# Patient Record
Sex: MALE | Race: WHITE | NOT HISPANIC OR LATINO | Employment: OTHER | URBAN - METROPOLITAN AREA
[De-identification: names, ages, dates, MRNs, and addresses within clinical notes are randomized per-mention and may not be internally consistent; named-entity substitution may affect disease eponyms.]

---

## 2018-04-02 ENCOUNTER — OFFICE VISIT CONVERTED (OUTPATIENT)
Dept: UROLOGY | Facility: CLINIC | Age: 78
End: 2018-04-02
Attending: UROLOGY

## 2018-04-02 ENCOUNTER — CONVERSION ENCOUNTER (OUTPATIENT)
Dept: UROLOGY | Facility: CLINIC | Age: 78
End: 2018-04-02

## 2018-05-04 ENCOUNTER — CONVERSION ENCOUNTER (OUTPATIENT)
Dept: OTHER | Facility: HOSPITAL | Age: 78
End: 2018-05-04

## 2018-05-04 ENCOUNTER — OFFICE VISIT CONVERTED (OUTPATIENT)
Dept: OTHER | Facility: HOSPITAL | Age: 78
End: 2018-05-04
Attending: SPECIALIST

## 2018-06-19 ENCOUNTER — CONVERSION ENCOUNTER (OUTPATIENT)
Dept: CARDIOLOGY | Facility: CLINIC | Age: 78
End: 2018-06-19
Attending: SPECIALIST

## 2018-08-09 ENCOUNTER — CONVERSION ENCOUNTER (OUTPATIENT)
Dept: FAMILY MEDICINE CLINIC | Facility: CLINIC | Age: 78
End: 2018-08-09

## 2018-08-09 ENCOUNTER — OFFICE VISIT CONVERTED (OUTPATIENT)
Dept: FAMILY MEDICINE CLINIC | Facility: CLINIC | Age: 78
End: 2018-08-09
Attending: NURSE PRACTITIONER

## 2019-02-13 ENCOUNTER — HOSPITAL ENCOUNTER (OUTPATIENT)
Dept: FAMILY MEDICINE CLINIC | Facility: CLINIC | Age: 79
Discharge: HOME | End: 2019-02-13
Attending: NURSE PRACTITIONER

## 2019-02-13 ENCOUNTER — OFFICE VISIT CONVERTED (OUTPATIENT)
Dept: FAMILY MEDICINE CLINIC | Facility: CLINIC | Age: 79
End: 2019-02-13
Attending: NURSE PRACTITIONER

## 2019-02-13 LAB
ALBUMIN SERPL-MCNC: 4.3 G/DL (ref 3.5–5)
ALBUMIN/GLOB SERPL: 1.6 {RATIO} (ref 1.4–2.6)
ALP SERPL-CCNC: 62 U/L (ref 56–155)
ALT SERPL-CCNC: 37 U/L (ref 10–40)
ANION GAP SERPL CALC-SCNC: 17 MMOL/L (ref 8–19)
APPEARANCE UR: ABNORMAL
AST SERPL-CCNC: 24 U/L (ref 15–50)
BASOPHILS # BLD AUTO: 0.02 10*3/UL (ref 0–0.2)
BASOPHILS NFR BLD AUTO: 0.33 % (ref 0–3)
BILIRUB SERPL-MCNC: 1.24 MG/DL (ref 0.2–1.3)
BILIRUB UR QL: NEGATIVE
BUN SERPL-MCNC: 23 MG/DL (ref 5–25)
BUN/CREAT SERPL: 18 {RATIO} (ref 6–20)
CALCIUM SERPL-MCNC: 9.9 MG/DL (ref 8.7–10.4)
CHLORIDE SERPL-SCNC: 102 MMOL/L (ref 99–111)
CHOLEST SERPL-MCNC: 191 MG/DL (ref 107–200)
CHOLEST/HDLC SERPL: 2.9 {RATIO} (ref 3–6)
COLOR UR: YELLOW
CONV BACTERIA: NEGATIVE
CONV CO2: 28 MMOL/L (ref 22–32)
CONV COLLECTION SOURCE (UA): ABNORMAL
CONV HYALINE CASTS IN URINE MICRO: ABNORMAL /[LPF]
CONV TOTAL PROTEIN: 7 G/DL (ref 6.3–8.2)
CONV UROBILINOGEN IN URINE BY AUTOMATED TEST STRIP: 0.2 {EHRLICHU}/DL (ref 0.1–1)
CREAT UR-MCNC: 1.29 MG/DL (ref 0.7–1.2)
EOSINOPHIL # BLD AUTO: 0.15 10*3/UL (ref 0–0.7)
EOSINOPHIL # BLD AUTO: 2.38 % (ref 0–7)
ERYTHROCYTE [DISTWIDTH] IN BLOOD BY AUTOMATED COUNT: 11.3 % (ref 11.5–14.5)
EST. AVERAGE GLUCOSE BLD GHB EST-MCNC: 128 MG/DL
GFR SERPLBLD BASED ON 1.73 SQ M-ARVRAT: 53 ML/MIN/{1.73_M2}
GLOBULIN UR ELPH-MCNC: 2.7 G/DL (ref 2–3.5)
GLUCOSE SERPL-MCNC: 117 MG/DL (ref 70–99)
GLUCOSE UR QL: NEGATIVE MG/DL
HBA1C MFR BLD: 15.2 G/DL (ref 14–18)
HBA1C MFR BLD: 6.1 % (ref 3.5–5.7)
HCT VFR BLD AUTO: 44.2 % (ref 42–52)
HDLC SERPL-MCNC: 66 MG/DL (ref 40–60)
HGB UR QL STRIP: NEGATIVE
KETONES UR QL STRIP: NEGATIVE MG/DL
LDLC SERPL CALC-MCNC: 85 MG/DL (ref 70–100)
LEUKOCYTE ESTERASE UR QL STRIP: ABNORMAL
LYMPHOCYTES # BLD AUTO: 1.79 10*3/UL (ref 1–5)
MCH RBC QN AUTO: 34.2 PG (ref 27–31)
MCHC RBC AUTO-ENTMCNC: 34.3 G/DL (ref 33–37)
MCV RBC AUTO: 99.7 FL (ref 80–96)
MONOCYTES # BLD AUTO: 0.52 10*3/UL (ref 0.2–1.2)
MONOCYTES NFR BLD AUTO: 8.2 % (ref 3–10)
NEUTROPHILS # BLD AUTO: 3.85 10*3/UL (ref 2–8)
NEUTROPHILS NFR BLD AUTO: 60.9 % (ref 30–85)
NITRITE UR QL STRIP: NEGATIVE
NRBC BLD AUTO-RTO: 0 % (ref 0–0.01)
OSMOLALITY SERPL CALC.SUM OF ELEC: 299 MOSM/KG (ref 273–304)
PH UR STRIP.AUTO: 5 [PH] (ref 5–8)
PLATELET # BLD AUTO: 228 10*3/UL (ref 130–400)
PMV BLD AUTO: 7.5 FL (ref 7.4–10.4)
POTASSIUM SERPL-SCNC: 4.9 MMOL/L (ref 3.5–5.3)
PROT UR QL: NEGATIVE MG/DL
RBC # BLD AUTO: 4.44 10*6/UL (ref 4.7–6.1)
RBC #/AREA URNS HPF: ABNORMAL /[HPF]
SODIUM SERPL-SCNC: 142 MMOL/L (ref 135–147)
SP GR UR: 1.02 (ref 1–1.03)
SQUAMOUS SPT QL MICRO: ABNORMAL /[HPF]
T4 FREE SERPL-MCNC: 1.1 NG/DL (ref 0.9–1.8)
TRIGL SERPL-MCNC: 201 MG/DL (ref 40–150)
TSH SERPL-ACNC: 4.06 M[IU]/L (ref 0.27–4.2)
VARIANT LYMPHS NFR BLD MANUAL: 28.2 % (ref 20–45)
VLDLC SERPL-MCNC: 40 MG/DL (ref 5–37)
WBC # BLD AUTO: 6.33 10*3/UL (ref 4.8–10.8)
WBC #/AREA URNS HPF: ABNORMAL /[HPF]

## 2019-02-14 LAB
CONV CREATININE URINE, RANDOM: 256.3 MG/DL (ref 10–300)
CONV MICROALBUM.,U,RANDOM: 57.9 MG/L (ref 0–20)
MICROALBUMIN/CREAT UR: 22.6 MG/G{CRE} (ref 0–25)

## 2019-08-13 ENCOUNTER — OFFICE VISIT CONVERTED (OUTPATIENT)
Dept: FAMILY MEDICINE CLINIC | Facility: CLINIC | Age: 79
End: 2019-08-13
Attending: NURSE PRACTITIONER

## 2019-08-13 ENCOUNTER — HOSPITAL ENCOUNTER (OUTPATIENT)
Dept: FAMILY MEDICINE CLINIC | Facility: CLINIC | Age: 79
Discharge: HOME | End: 2019-08-13
Attending: NURSE PRACTITIONER

## 2019-08-13 LAB
ALBUMIN SERPL-MCNC: 4.3 G/DL (ref 3.5–5)
ALBUMIN/GLOB SERPL: 1.5 {RATIO} (ref 1.4–2.6)
ALP SERPL-CCNC: 73 U/L (ref 56–155)
ALT SERPL-CCNC: 36 U/L (ref 10–40)
ANION GAP SERPL CALC-SCNC: 21 MMOL/L (ref 8–19)
APPEARANCE UR: ABNORMAL
AST SERPL-CCNC: 28 U/L (ref 15–50)
BASOPHILS # BLD AUTO: 0.04 10*3/UL (ref 0–0.2)
BASOPHILS NFR BLD AUTO: 0.6 % (ref 0–3)
BILIRUB SERPL-MCNC: 0.69 MG/DL (ref 0.2–1.3)
BILIRUB UR QL: NEGATIVE
BUN SERPL-MCNC: 22 MG/DL (ref 5–25)
BUN/CREAT SERPL: 18 {RATIO} (ref 6–20)
CALCIUM SERPL-MCNC: 9.8 MG/DL (ref 8.7–10.4)
CHLORIDE SERPL-SCNC: 103 MMOL/L (ref 99–111)
CHOLEST SERPL-MCNC: 179 MG/DL (ref 107–200)
CHOLEST/HDLC SERPL: 2.7 {RATIO} (ref 3–6)
COLOR UR: YELLOW
CONV ABS IMM GRAN: 0.03 10*3/UL (ref 0–0.2)
CONV BACTERIA: NEGATIVE
CONV CO2: 23 MMOL/L (ref 22–32)
CONV COLLECTION SOURCE (UA): ABNORMAL
CONV CREATININE URINE, RANDOM: 229.1 MG/DL (ref 10–300)
CONV IMMATURE GRAN: 0.4 % (ref 0–1.8)
CONV MICROALBUM.,U,RANDOM: 22.3 MG/L (ref 0–20)
CONV TOTAL PROTEIN: 7.1 G/DL (ref 6.3–8.2)
CONV UROBILINOGEN IN URINE BY AUTOMATED TEST STRIP: 0.2 {EHRLICHU}/DL (ref 0.1–1)
CREAT UR-MCNC: 1.22 MG/DL (ref 0.7–1.2)
DEPRECATED RDW RBC AUTO: 46.6 FL (ref 35.1–43.9)
EOSINOPHIL # BLD AUTO: 0.17 10*3/UL (ref 0–0.7)
EOSINOPHIL # BLD AUTO: 2.4 % (ref 0–7)
ERYTHROCYTE [DISTWIDTH] IN BLOOD BY AUTOMATED COUNT: 12.4 % (ref 11.6–14.4)
EST. AVERAGE GLUCOSE BLD GHB EST-MCNC: 123 MG/DL
GFR SERPLBLD BASED ON 1.73 SQ M-ARVRAT: 57 ML/MIN/{1.73_M2}
GLOBULIN UR ELPH-MCNC: 2.8 G/DL (ref 2–3.5)
GLUCOSE SERPL-MCNC: 112 MG/DL (ref 70–99)
GLUCOSE UR QL: NEGATIVE MG/DL
HBA1C MFR BLD: 5.9 % (ref 3.5–5.7)
HCT VFR BLD AUTO: 43.9 % (ref 42–52)
HDLC SERPL-MCNC: 66 MG/DL (ref 40–60)
HGB BLD-MCNC: 14.6 G/DL (ref 14–18)
HGB UR QL STRIP: NEGATIVE
KETONES UR QL STRIP: NEGATIVE MG/DL
LDLC SERPL CALC-MCNC: 92 MG/DL (ref 70–100)
LEUKOCYTE ESTERASE UR QL STRIP: ABNORMAL
LYMPHOCYTES # BLD AUTO: 1.72 10*3/UL (ref 1–5)
LYMPHOCYTES NFR BLD AUTO: 24.1 % (ref 20–45)
MCH RBC QN AUTO: 33.8 PG (ref 27–31)
MCHC RBC AUTO-ENTMCNC: 33.3 G/DL (ref 33–37)
MCV RBC AUTO: 101.6 FL (ref 80–96)
MICROALBUMIN/CREAT UR: 9.7 MG/G{CRE} (ref 0–25)
MONOCYTES # BLD AUTO: 0.57 10*3/UL (ref 0.2–1.2)
MONOCYTES NFR BLD AUTO: 8 % (ref 3–10)
NEUTROPHILS # BLD AUTO: 4.61 10*3/UL (ref 2–8)
NEUTROPHILS NFR BLD AUTO: 64.5 % (ref 30–85)
NITRITE UR QL STRIP: NEGATIVE
NRBC CBCN: 0 % (ref 0–0.7)
OSMOLALITY SERPL CALC.SUM OF ELEC: 298 MOSM/KG (ref 273–304)
PH UR STRIP.AUTO: 5 [PH] (ref 5–8)
PLATELET # BLD AUTO: 237 10*3/UL (ref 130–400)
PMV BLD AUTO: 10 FL (ref 9.4–12.4)
POTASSIUM SERPL-SCNC: 5.1 MMOL/L (ref 3.5–5.3)
PROT UR QL: NEGATIVE MG/DL
PSA SERPL-MCNC: 0.12 NG/ML (ref 0–4)
RBC # BLD AUTO: 4.32 10*6/UL (ref 4.7–6.1)
RBC #/AREA URNS HPF: ABNORMAL /[HPF]
SODIUM SERPL-SCNC: 142 MMOL/L (ref 135–147)
SP GR UR: 1.02 (ref 1–1.03)
SQUAMOUS SPT QL MICRO: ABNORMAL /[HPF]
TRIGL SERPL-MCNC: 105 MG/DL (ref 40–150)
VLDLC SERPL-MCNC: 21 MG/DL (ref 5–37)
WBC # BLD AUTO: 7.14 10*3/UL (ref 4.8–10.8)
WBC #/AREA URNS HPF: ABNORMAL /[HPF]

## 2020-01-20 ENCOUNTER — OFFICE VISIT CONVERTED (OUTPATIENT)
Dept: FAMILY MEDICINE CLINIC | Facility: CLINIC | Age: 80
End: 2020-01-20
Attending: NURSE PRACTITIONER

## 2020-01-20 ENCOUNTER — HOSPITAL ENCOUNTER (OUTPATIENT)
Dept: FAMILY MEDICINE CLINIC | Facility: CLINIC | Age: 80
Discharge: HOME | End: 2020-01-20
Attending: NURSE PRACTITIONER

## 2020-01-20 LAB
ALBUMIN SERPL-MCNC: 4.3 G/DL (ref 3.5–5)
ALBUMIN/GLOB SERPL: 1.6 {RATIO} (ref 1.4–2.6)
ALP SERPL-CCNC: 63 U/L (ref 56–155)
ALT SERPL-CCNC: 36 U/L (ref 10–40)
ANION GAP SERPL CALC-SCNC: 20 MMOL/L (ref 8–19)
APPEARANCE UR: ABNORMAL
AST SERPL-CCNC: 26 U/L (ref 15–50)
BASOPHILS # BLD AUTO: 0.03 10*3/UL (ref 0–0.2)
BASOPHILS NFR BLD AUTO: 0.5 % (ref 0–3)
BILIRUB SERPL-MCNC: 0.89 MG/DL (ref 0.2–1.3)
BILIRUB UR QL: NEGATIVE
BUN SERPL-MCNC: 22 MG/DL (ref 5–25)
BUN/CREAT SERPL: 16 {RATIO} (ref 6–20)
CALCIUM SERPL-MCNC: 10 MG/DL (ref 8.7–10.4)
CHLORIDE SERPL-SCNC: 100 MMOL/L (ref 99–111)
CHOLEST SERPL-MCNC: 181 MG/DL (ref 107–200)
CHOLEST/HDLC SERPL: 3 {RATIO} (ref 3–6)
COLOR UR: YELLOW
CONV ABS IMM GRAN: 0.03 10*3/UL (ref 0–0.2)
CONV BACTERIA: NEGATIVE
CONV CO2: 26 MMOL/L (ref 22–32)
CONV COLLECTION SOURCE (UA): ABNORMAL
CONV CREATININE URINE, RANDOM: 233.7 MG/DL (ref 10–300)
CONV HYALINE CASTS IN URINE MICRO: ABNORMAL /[LPF]
CONV IMMATURE GRAN: 0.5 % (ref 0–1.8)
CONV MICROALBUM.,U,RANDOM: 16.2 MG/L (ref 0–20)
CONV TOTAL PROTEIN: 7 G/DL (ref 6.3–8.2)
CONV UROBILINOGEN IN URINE BY AUTOMATED TEST STRIP: 0.2 {EHRLICHU}/DL (ref 0.1–1)
CREAT UR-MCNC: 1.41 MG/DL (ref 0.7–1.2)
DEPRECATED RDW RBC AUTO: 45.1 FL (ref 35.1–43.9)
EOSINOPHIL # BLD AUTO: 0.18 10*3/UL (ref 0–0.7)
EOSINOPHIL # BLD AUTO: 2.8 % (ref 0–7)
ERYTHROCYTE [DISTWIDTH] IN BLOOD BY AUTOMATED COUNT: 12 % (ref 11.6–14.4)
EST. AVERAGE GLUCOSE BLD GHB EST-MCNC: 123 MG/DL
GFR SERPLBLD BASED ON 1.73 SQ M-ARVRAT: 47 ML/MIN/{1.73_M2}
GLOBULIN UR ELPH-MCNC: 2.7 G/DL (ref 2–3.5)
GLUCOSE SERPL-MCNC: 118 MG/DL (ref 70–99)
GLUCOSE UR QL: NEGATIVE MG/DL
HBA1C MFR BLD: 5.9 % (ref 3.5–5.7)
HCT VFR BLD AUTO: 44.1 % (ref 42–52)
HDLC SERPL-MCNC: 61 MG/DL (ref 40–60)
HGB BLD-MCNC: 14.8 G/DL (ref 14–18)
HGB UR QL STRIP: NEGATIVE
KETONES UR QL STRIP: NEGATIVE MG/DL
LDLC SERPL CALC-MCNC: 94 MG/DL (ref 70–100)
LEUKOCYTE ESTERASE UR QL STRIP: ABNORMAL
LYMPHOCYTES # BLD AUTO: 1.95 10*3/UL (ref 1–5)
LYMPHOCYTES NFR BLD AUTO: 30.6 % (ref 20–45)
MCH RBC QN AUTO: 33.9 PG (ref 27–31)
MCHC RBC AUTO-ENTMCNC: 33.6 G/DL (ref 33–37)
MCV RBC AUTO: 101.1 FL (ref 80–96)
MICROALBUMIN/CREAT UR: 6.9 MG/G{CRE} (ref 0–25)
MONOCYTES # BLD AUTO: 0.56 10*3/UL (ref 0.2–1.2)
MONOCYTES NFR BLD AUTO: 8.8 % (ref 3–10)
NEUTROPHILS # BLD AUTO: 3.62 10*3/UL (ref 2–8)
NEUTROPHILS NFR BLD AUTO: 56.8 % (ref 30–85)
NITRITE UR QL STRIP: NEGATIVE
NRBC CBCN: 0 % (ref 0–0.7)
OSMOLALITY SERPL CALC.SUM OF ELEC: 296 MOSM/KG (ref 273–304)
PH UR STRIP.AUTO: 5 [PH] (ref 5–8)
PLATELET # BLD AUTO: 205 10*3/UL (ref 130–400)
PMV BLD AUTO: 10.6 FL (ref 9.4–12.4)
POTASSIUM SERPL-SCNC: 4.8 MMOL/L (ref 3.5–5.3)
PROT UR QL: NEGATIVE MG/DL
RBC # BLD AUTO: 4.36 10*6/UL (ref 4.7–6.1)
RBC #/AREA URNS HPF: ABNORMAL /[HPF]
SODIUM SERPL-SCNC: 141 MMOL/L (ref 135–147)
SP GR UR: 1.02 (ref 1–1.03)
SQUAMOUS SPT QL MICRO: ABNORMAL /[HPF]
T4 FREE SERPL-MCNC: 1 NG/DL (ref 0.9–1.8)
TRIGL SERPL-MCNC: 130 MG/DL (ref 40–150)
TSH SERPL-ACNC: 2.64 M[IU]/L (ref 0.27–4.2)
VLDLC SERPL-MCNC: 26 MG/DL (ref 5–37)
WBC # BLD AUTO: 6.37 10*3/UL (ref 4.8–10.8)
WBC #/AREA URNS HPF: ABNORMAL /[HPF]

## 2020-01-28 ENCOUNTER — HOSPITAL ENCOUNTER (OUTPATIENT)
Dept: CARDIOLOGY | Facility: HOSPITAL | Age: 80
Discharge: HOME | End: 2020-01-28
Attending: NURSE PRACTITIONER

## 2020-07-23 ENCOUNTER — OFFICE VISIT CONVERTED (OUTPATIENT)
Dept: FAMILY MEDICINE CLINIC | Facility: CLINIC | Age: 80
End: 2020-07-23
Attending: NURSE PRACTITIONER

## 2020-07-23 ENCOUNTER — HOSPITAL ENCOUNTER (OUTPATIENT)
Dept: FAMILY MEDICINE CLINIC | Facility: CLINIC | Age: 80
Discharge: HOME | End: 2020-07-23
Attending: NURSE PRACTITIONER

## 2020-07-23 LAB
ALBUMIN SERPL-MCNC: 4.2 G/DL (ref 3.5–5)
ALBUMIN/GLOB SERPL: 1.5 {RATIO} (ref 1.4–2.6)
ALP SERPL-CCNC: 65 U/L (ref 56–155)
ALT SERPL-CCNC: 28 U/L (ref 10–40)
ANION GAP SERPL CALC-SCNC: 18 MMOL/L (ref 8–19)
APPEARANCE UR: CLEAR
AST SERPL-CCNC: 26 U/L (ref 15–50)
BASOPHILS # BLD AUTO: 0.03 10*3/UL (ref 0–0.2)
BASOPHILS NFR BLD AUTO: 0.5 % (ref 0–3)
BILIRUB SERPL-MCNC: 0.82 MG/DL (ref 0.2–1.3)
BILIRUB UR QL: NEGATIVE
BUN SERPL-MCNC: 21 MG/DL (ref 5–25)
BUN/CREAT SERPL: 16 {RATIO} (ref 6–20)
CALCIUM SERPL-MCNC: 9.6 MG/DL (ref 8.7–10.4)
CHLORIDE SERPL-SCNC: 101 MMOL/L (ref 99–111)
CHOLEST SERPL-MCNC: 205 MG/DL (ref 107–200)
CHOLEST/HDLC SERPL: 3 {RATIO} (ref 3–6)
COLOR UR: YELLOW
CONV ABS IMM GRAN: 0.02 10*3/UL (ref 0–0.2)
CONV BACTERIA: NEGATIVE
CONV CO2: 27 MMOL/L (ref 22–32)
CONV COLLECTION SOURCE (UA): ABNORMAL
CONV CREATININE URINE, RANDOM: 193.1 MG/DL (ref 10–300)
CONV IMMATURE GRAN: 0.3 % (ref 0–1.8)
CONV MICROALBUM.,U,RANDOM: 13.5 MG/L (ref 0–20)
CONV TOTAL PROTEIN: 7 G/DL (ref 6.3–8.2)
CONV UROBILINOGEN IN URINE BY AUTOMATED TEST STRIP: 0.2 {EHRLICHU}/DL (ref 0.1–1)
CREAT UR-MCNC: 1.29 MG/DL (ref 0.7–1.2)
DEPRECATED RDW RBC AUTO: 46.4 FL (ref 35.1–43.9)
EOSINOPHIL # BLD AUTO: 0.12 10*3/UL (ref 0–0.7)
EOSINOPHIL # BLD AUTO: 2 % (ref 0–7)
ERYTHROCYTE [DISTWIDTH] IN BLOOD BY AUTOMATED COUNT: 12.3 % (ref 11.6–14.4)
EST. AVERAGE GLUCOSE BLD GHB EST-MCNC: 128 MG/DL
GFR SERPLBLD BASED ON 1.73 SQ M-ARVRAT: 53 ML/MIN/{1.73_M2}
GLOBULIN UR ELPH-MCNC: 2.8 G/DL (ref 2–3.5)
GLUCOSE SERPL-MCNC: 159 MG/DL (ref 70–99)
GLUCOSE UR QL: NEGATIVE MG/DL
HBA1C MFR BLD: 6.1 % (ref 3.5–5.7)
HCT VFR BLD AUTO: 44.6 % (ref 42–52)
HDLC SERPL-MCNC: 68 MG/DL (ref 40–60)
HGB BLD-MCNC: 14.7 G/DL (ref 14–18)
HGB UR QL STRIP: NEGATIVE
KETONES UR QL STRIP: NEGATIVE MG/DL
LDLC SERPL CALC-MCNC: 112 MG/DL (ref 70–100)
LEUKOCYTE ESTERASE UR QL STRIP: ABNORMAL
LYMPHOCYTES # BLD AUTO: 1.78 10*3/UL (ref 1–5)
LYMPHOCYTES NFR BLD AUTO: 30.3 % (ref 20–45)
MCH RBC QN AUTO: 33.6 PG (ref 27–31)
MCHC RBC AUTO-ENTMCNC: 33 G/DL (ref 33–37)
MCV RBC AUTO: 102.1 FL (ref 80–96)
MICROALBUMIN/CREAT UR: 7 MG/G{CRE} (ref 0–25)
MONOCYTES # BLD AUTO: 0.47 10*3/UL (ref 0.2–1.2)
MONOCYTES NFR BLD AUTO: 8 % (ref 3–10)
NEUTROPHILS # BLD AUTO: 3.46 10*3/UL (ref 2–8)
NEUTROPHILS NFR BLD AUTO: 58.9 % (ref 30–85)
NITRITE UR QL STRIP: NEGATIVE
NRBC CBCN: 0 % (ref 0–0.7)
OSMOLALITY SERPL CALC.SUM OF ELEC: 298 MOSM/KG (ref 273–304)
PH UR STRIP.AUTO: 5 [PH] (ref 5–8)
PLATELET # BLD AUTO: 225 10*3/UL (ref 130–400)
PMV BLD AUTO: 10 FL (ref 9.4–12.4)
POTASSIUM SERPL-SCNC: 5 MMOL/L (ref 3.5–5.3)
PROT UR QL: NEGATIVE MG/DL
RBC # BLD AUTO: 4.37 10*6/UL (ref 4.7–6.1)
RBC #/AREA URNS HPF: ABNORMAL /[HPF]
SODIUM SERPL-SCNC: 141 MMOL/L (ref 135–147)
SP GR UR: 1.02 (ref 1–1.03)
TRIGL SERPL-MCNC: 124 MG/DL (ref 40–150)
VLDLC SERPL-MCNC: 25 MG/DL (ref 5–37)
WBC # BLD AUTO: 5.88 10*3/UL (ref 4.8–10.8)
WBC #/AREA URNS HPF: ABNORMAL /[HPF]

## 2020-08-25 ENCOUNTER — HOSPITAL ENCOUNTER (OUTPATIENT)
Dept: CARDIOLOGY | Facility: HOSPITAL | Age: 80
Discharge: HOME | End: 2020-08-25

## 2020-10-30 ENCOUNTER — OFFICE VISIT CONVERTED (OUTPATIENT)
Dept: FAMILY MEDICINE CLINIC | Facility: CLINIC | Age: 80
End: 2020-10-30
Attending: NURSE PRACTITIONER

## 2020-10-30 ENCOUNTER — HOSPITAL ENCOUNTER (OUTPATIENT)
Dept: FAMILY MEDICINE CLINIC | Facility: CLINIC | Age: 80
Discharge: HOME | End: 2020-10-30
Attending: NURSE PRACTITIONER

## 2020-10-31 LAB — PSA SERPL-MCNC: 0.07 NG/ML (ref 0–4)

## 2021-02-09 ENCOUNTER — HOSPITAL ENCOUNTER (OUTPATIENT)
Dept: VACCINE CLINIC | Facility: HOSPITAL | Age: 81
Discharge: HOME | End: 2021-02-09
Attending: INTERNAL MEDICINE

## 2021-03-11 ENCOUNTER — HOSPITAL ENCOUNTER (OUTPATIENT)
Dept: VACCINE CLINIC | Facility: HOSPITAL | Age: 81
Discharge: HOME | End: 2021-03-11
Attending: INTERNAL MEDICINE

## 2021-03-23 ENCOUNTER — CONVERSION ENCOUNTER (OUTPATIENT)
Dept: FAMILY MEDICINE CLINIC | Facility: CLINIC | Age: 81
End: 2021-03-23

## 2021-03-23 ENCOUNTER — OFFICE VISIT CONVERTED (OUTPATIENT)
Dept: FAMILY MEDICINE CLINIC | Facility: CLINIC | Age: 81
End: 2021-03-23
Attending: NURSE PRACTITIONER

## 2021-03-23 ENCOUNTER — HOSPITAL ENCOUNTER (OUTPATIENT)
Dept: FAMILY MEDICINE CLINIC | Facility: CLINIC | Age: 81
Discharge: HOME | End: 2021-03-23
Attending: NURSE PRACTITIONER

## 2021-03-23 LAB
ALBUMIN SERPL-MCNC: 4 G/DL (ref 3.5–5)
ALBUMIN/GLOB SERPL: 1.2 {RATIO} (ref 1.4–2.6)
ALP SERPL-CCNC: 75 U/L (ref 56–155)
ALT SERPL-CCNC: 23 U/L (ref 10–40)
ANION GAP SERPL CALC-SCNC: 19 MMOL/L (ref 8–19)
APPEARANCE UR: CLEAR
AST SERPL-CCNC: 24 U/L (ref 15–50)
BILIRUB SERPL-MCNC: 0.96 MG/DL (ref 0.2–1.3)
BILIRUB UR QL: NEGATIVE
BUN SERPL-MCNC: 22 MG/DL (ref 5–25)
BUN/CREAT SERPL: 14 {RATIO} (ref 6–20)
CALCIUM SERPL-MCNC: 9.6 MG/DL (ref 8.7–10.4)
CHLORIDE SERPL-SCNC: 103 MMOL/L (ref 99–111)
CHOLEST SERPL-MCNC: 168 MG/DL (ref 107–200)
CHOLEST/HDLC SERPL: 2.2 {RATIO} (ref 3–6)
COLOR UR: YELLOW
CONV BACTERIA: NEGATIVE
CONV CO2: 26 MMOL/L (ref 22–32)
CONV COLLECTION SOURCE (UA): ABNORMAL
CONV CREATININE URINE, RANDOM: 290.6 MG/DL (ref 10–300)
CONV HYALINE CASTS IN URINE MICRO: ABNORMAL /[LPF]
CONV MICROALBUM.,U,RANDOM: 31 MG/L (ref 0–20)
CONV TOTAL PROTEIN: 7.3 G/DL (ref 6.3–8.2)
CONV UROBILINOGEN IN URINE BY AUTOMATED TEST STRIP: 1 {EHRLICHU}/DL (ref 0.1–1)
CREAT UR-MCNC: 1.52 MG/DL (ref 0.7–1.2)
GFR SERPLBLD BASED ON 1.73 SQ M-ARVRAT: 43 ML/MIN/{1.73_M2}
GLOBULIN UR ELPH-MCNC: 3.3 G/DL (ref 2–3.5)
GLUCOSE SERPL-MCNC: 118 MG/DL (ref 70–99)
GLUCOSE UR QL: NEGATIVE MG/DL
HDLC SERPL-MCNC: 75 MG/DL (ref 40–60)
HGB UR QL STRIP: NEGATIVE
KETONES UR QL STRIP: ABNORMAL MG/DL
LDLC SERPL CALC-MCNC: 69 MG/DL (ref 70–100)
LEUKOCYTE ESTERASE UR QL STRIP: ABNORMAL
MICROALBUMIN/CREAT UR: 10.7 MG/G{CRE} (ref 0–25)
NITRITE UR QL STRIP: NEGATIVE
OSMOLALITY SERPL CALC.SUM OF ELEC: 300 MOSM/KG (ref 273–304)
PH UR STRIP.AUTO: 5 [PH] (ref 5–8)
POTASSIUM SERPL-SCNC: 5 MMOL/L (ref 3.5–5.3)
PROT UR QL: ABNORMAL MG/DL
RBC #/AREA URNS HPF: ABNORMAL /[HPF]
SODIUM SERPL-SCNC: 143 MMOL/L (ref 135–147)
SP GR UR: 1.02 (ref 1–1.03)
SQUAMOUS SPT QL MICRO: ABNORMAL /[HPF]
T4 FREE SERPL-MCNC: 1.1 NG/DL (ref 0.9–1.8)
TRIGL SERPL-MCNC: 118 MG/DL (ref 40–150)
TSH SERPL-ACNC: 2.73 M[IU]/L (ref 0.27–4.2)
VLDLC SERPL-MCNC: 24 MG/DL (ref 5–37)
WBC #/AREA URNS HPF: ABNORMAL /[HPF]

## 2021-03-24 LAB
BASOPHILS # BLD AUTO: 0.03 10*3/UL (ref 0–0.2)
BASOPHILS NFR BLD AUTO: 0.4 % (ref 0–3)
CONV ABS IMM GRAN: 0.04 10*3/UL (ref 0–0.2)
CONV IMMATURE GRAN: 0.5 % (ref 0–1.8)
DEPRECATED RDW RBC AUTO: 45.9 FL (ref 35.1–43.9)
EOSINOPHIL # BLD AUTO: 0.14 10*3/UL (ref 0–0.7)
EOSINOPHIL # BLD AUTO: 1.8 % (ref 0–7)
ERYTHROCYTE [DISTWIDTH] IN BLOOD BY AUTOMATED COUNT: 12.7 % (ref 11.6–14.4)
EST. AVERAGE GLUCOSE BLD GHB EST-MCNC: 131 MG/DL
HBA1C MFR BLD: 6.2 % (ref 3.5–5.7)
HCT VFR BLD AUTO: 39.9 % (ref 42–52)
HGB BLD-MCNC: 13.3 G/DL (ref 14–18)
LYMPHOCYTES # BLD AUTO: 2.19 10*3/UL (ref 1–5)
LYMPHOCYTES NFR BLD AUTO: 27.5 % (ref 20–45)
MCH RBC QN AUTO: 33.1 PG (ref 27–31)
MCHC RBC AUTO-ENTMCNC: 33.3 G/DL (ref 33–37)
MCV RBC AUTO: 99.3 FL (ref 80–96)
MONOCYTES # BLD AUTO: 0.67 10*3/UL (ref 0.2–1.2)
MONOCYTES NFR BLD AUTO: 8.4 % (ref 3–10)
NEUTROPHILS # BLD AUTO: 4.88 10*3/UL (ref 2–8)
NEUTROPHILS NFR BLD AUTO: 61.4 % (ref 30–85)
NRBC CBCN: 0 % (ref 0–0.7)
PLATELET # BLD AUTO: 232 10*3/UL (ref 130–400)
PMV BLD AUTO: 10.2 FL (ref 9.4–12.4)
RBC # BLD AUTO: 4.02 10*6/UL (ref 4.7–6.1)
WBC # BLD AUTO: 7.95 10*3/UL (ref 4.8–10.8)

## 2021-04-07 ENCOUNTER — HOSPITAL ENCOUNTER (OUTPATIENT)
Dept: FAMILY MEDICINE CLINIC | Facility: CLINIC | Age: 81
Discharge: HOME | End: 2021-04-07
Attending: NURSE PRACTITIONER

## 2021-04-07 LAB
BASOPHILS # BLD AUTO: 0.04 10*3/UL (ref 0–0.2)
BASOPHILS NFR BLD AUTO: 0.6 % (ref 0–3)
CONV ABS IMM GRAN: 0.03 10*3/UL (ref 0–0.2)
CONV IMMATURE GRAN: 0.5 % (ref 0–1.8)
DEPRECATED RDW RBC AUTO: 45.2 FL (ref 35.1–43.9)
EOSINOPHIL # BLD AUTO: 0.17 10*3/UL (ref 0–0.7)
EOSINOPHIL # BLD AUTO: 2.6 % (ref 0–7)
ERYTHROCYTE [DISTWIDTH] IN BLOOD BY AUTOMATED COUNT: 12.4 % (ref 11.6–14.4)
FERRITIN SERPL-MCNC: 309 NG/ML (ref 30–300)
FOLATE SERPL-MCNC: >20 NG/ML (ref 4.8–20)
HCT VFR BLD AUTO: 39.7 % (ref 42–52)
HGB BLD-MCNC: 13.2 G/DL (ref 14–18)
IRON SATN MFR SERPL: 28 % (ref 20–55)
IRON SERPL-MCNC: 82 UG/DL (ref 70–180)
LYMPHOCYTES # BLD AUTO: 2.67 10*3/UL (ref 1–5)
LYMPHOCYTES NFR BLD AUTO: 41.1 % (ref 20–45)
MCH RBC QN AUTO: 32.8 PG (ref 27–31)
MCHC RBC AUTO-ENTMCNC: 33.2 G/DL (ref 33–37)
MCV RBC AUTO: 98.8 FL (ref 80–96)
MONOCYTES # BLD AUTO: 0.6 10*3/UL (ref 0.2–1.2)
MONOCYTES NFR BLD AUTO: 9.2 % (ref 3–10)
NEUTROPHILS # BLD AUTO: 2.99 10*3/UL (ref 2–8)
NEUTROPHILS NFR BLD AUTO: 46 % (ref 30–85)
NRBC CBCN: 0 % (ref 0–0.7)
PLATELET # BLD AUTO: 221 10*3/UL (ref 130–400)
PMV BLD AUTO: 10.1 FL (ref 9.4–12.4)
RBC # BLD AUTO: 4.02 10*6/UL (ref 4.7–6.1)
TIBC SERPL-MCNC: 296 UG/DL (ref 245–450)
TRANSFERRIN SERPL-MCNC: 207 MG/DL (ref 215–365)
VIT B12 SERPL-MCNC: 490 PG/ML (ref 211–911)
WBC # BLD AUTO: 6.5 10*3/UL (ref 4.8–10.8)

## 2021-05-07 NOTE — PROGRESS NOTES
"   Progress Note      Patient Name: Rudy Moore   Patient ID: [id number]   Sex: Male   YOB: 1940        Visit Date: August 13, 2019    Provider: RUSS Jordan   Location: [facility]   Location Address: [address] [place] [address] [address] [place]  [address]   Location Phone: [phone]          Chief Complaint     refills  check spot under left eye       History Of Present Illness  Rudy Moore is a 78 year old /White male who presents for evaluation and treatment of:      follow up on chronic health conditions and medication refills.     HTN/HLD -- blood pressure has been borderline -- no c/o chest pain, palpitations -- he does get dizzy intermittently -- has known atherosclerotic cardiovascular disease/PAD --described \"clogged arteries\" in his neck and not being a surgical candidate. He also has PAD and failed Pletal -- he gets pain in his legs with just walking short distances -- cramping, like a [place] Horse.     DM -- last eye exam was this time last year -- he is due -- His last A1C was 6.1% -- blood sugars are less than 140 fasting; he checks daily. No trouble with numbness or tingling. No issues with vision.     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 He has noticed it back now for past few years.       Past Medical History  Disease Name Date Onset Notes   CAD (coronary artery disease) --  --    Cataract --  --    Diabetes mellitus, type 2 --  --    GERD (gastroesophageal reflux disease) --  --    Hyperlipidemia --  --    Hypertension, Benign Essential --  --    Kidney stones --  --    Seasonal allergies --  --          Past Surgical History  Procedure Name Date Notes   Heart Bypass --  --          Medication List  Name Date Started Instructions   atorvastatin 20 mg oral tablet " 08/13/2019 take 1 tablet (20 mg) by oral route once daily for 90 days   metoprolol tartrate 50 mg oral tablet 08/13/2019 TAKE 1 TAB DAILY   quinapril 40 mg oral tablet 08/13/2019 take 1 tablet (40 mg) by oral route once daily for 90 days   True Comfort Lancet 30 gauge miscellaneous misc 05/01/2019 test twice daily   True Metrix Air Glucose Meter miscellaneous kit 04/26/2019 check blood sugar twice a day   True Metrix Glucose Test Strip miscellaneous strip 05/01/2019 test glucose twice daily         Allergy List  Allergen Name Date Reaction Notes   NO KNOWN DRUG ALLERGIES --  --  --          Family Medical History  Disease Name Relative/Age Notes   Asthma Sister/   Sister   Chronic Obstructive Pulmonary Disease Sister/   Sister   DM Type I Brother/   Brother   Arthrtis Mother/   Mother   Alcohol Abuse Father/   Father   Family History Of Breast Cancer Sister/   Sister   Cerebrovascular Accident (CVA) Brother/   Brother         Social History  Finding Status Start/Stop Quantity Notes   Alcohol Current every day --/-- --  drinks alcohol, 8-14 drinks per week   Exercises regularly --  --/-- --  7 times per week   Recreational Drug Use Never --/-- --  never used   Second hand smoke exposure Unknown --/-- --  no   Tobacco Former --/-- 2 PPD former smoker, smokes 1 to 2 packs per day, currently uses other tobacco products   Uses seatbelts --  --/-- --  no         Immunizations  NameDate Admin Mfg Trade Name Lot Number Route Inj VIS Given VIS Publication   Wlbqxudbj43/01/2017 UNK Unknown TradeName [id number] NE NE 12/19/2017 08/07/2015   Comments:    Tcosyat4823/01/2017 W[employer] PREVNAR 13 [id number] NE NE 12/19/2017 11/05/2015   Comments:          Review of Systems  · Constitutional  o Admits  o : good general health lately  · Eyes  o Denies  o : changes in vision  · HENT  o Denies  o : headaches, vertigo  · Cardiovascular  o Admits  o : claudication, dizziness, PAD/ASCVD  o Denies  o : chest pain, dyspnea on exertion, lower extremity  "edema, palpitations  · Respiratory  o Denies  o : shortness of breath  · Gastrointestinal  o Denies  o : nausea, vomiting, diarrhea, constipation, abdominal pain  · Genitourinary  o Denies  o : dysuria, urinary retention  · Integument  o Admits  o : new skin lesions  · Neurologic  o Denies  o : tingling or numbness  · Musculoskeletal  o Admits  o : joint pain, muscle cramps  · Endocrine  o Denies  o : polyuria, polydipsia, cold intolerance, heat intolerance  · Psychiatric  o Denies  o : anxiety, depression, difficulty sleeping      Vitals  Date Time BP Position Site L\R Cuff Size HR RR TEMP (F) WT  HT  BMI kg/m2 BSA m2 O2 Sat HC       02/13/2019 08:46 /80 Sitting    111 - R  96.6 209lbs 0oz 5'  10\" 29.99 2.16 98 %    08/13/2019 09:13 /90 Sitting    106 - R  97.6 207lbs 0oz    97 %          Physical Examination  · Constitutional  o Appearance  o : well developed, well-nourished, in no acute distress  · Eyes  o Conjunctivae  o : conjunctivae normal, no exudates present  · Neck  o Inspection/Palpation  o : normal appearance, no masses or tenderness, trachea midline  o Thyroid  o : no thyromegaly  · Respiratory  o Respiratory Effort  o : breathing unlabored  o Auscultation of Lungs  o : clear to ascultation  · Cardiovascular  o Heart  o :   § Auscultation of Heart  § : regular rate, normal rhythm, no murmurs present  o Peripheral Vascular System  o :   § Carotid Arteries  § : normal pulses bilaterally, no bruits present  § Pedal Pulses  § : bilateral pulse strength 1+   § Extremities  § : no edema  · Gastrointestinal  o Abdominal Examination  o :   § Abdomen  § : soft, non-tender, non-distended, bowel sounds +  · Lymphatic  o Neck  o : no lymphadenopathy present  · Musculoskeletal  o General  o :   § General Musculoskeletal  § : Muscle tone, strength, and development grossly normal.  · Skin and Subcutaneous Tissue  o General Inspection  o : on his left cheek, just under his eye, is a pinpoint white " lesion  · Neurologic  o Gait and Station  o :   § Gait Screening  § : normal gait  · Psychiatric  o Mood and Affect  o : mood normal, affect appropriate          Assessment  · Diabetes mellitus, type 2     250.00/E11.9  · Essential hypertension     401.9/I10  · Hyperlipidemia     272.4/E78.5  · CAD (coronary artery disease)     414.00/I25.10  · PAD (peripheral artery disease)     443.9/I73.9  · Skin lesion of cheek     709.9/L98.9  · Prostate cancer screening     V76.44/Z12.5      Plan  · Orders  o CBC with Auto Diff [facility] ([id number]) - 401.9/I10, 272.4/E78.5, 250.00/E11.9 - 08/13/2019  o CMP [facility] ([id number]) - 401.9/I10, 272.4/E78.5, 250.00/E11.9 - 08/13/2019  o Hgb A1c [facility] ([id number]) - 250.00/E11.9 - 08/13/2019  o Lipid Panel [facility] ([id number]) - 401.9/I10, 272.4/E78.5, 250.00/E11.9 - 08/13/2019  o Urinalysis with Reflex Microscopy if abnormal ([facility]) ([id number]) - 401.9/I10, 250.00/E11.9 - 08/13/2019  o Urine microalbumin (78778) - 250.00/E11.9, 401.9/I10 - 08/13/2019  o ACO-39: Current medications updated and reviewed () - - 08/13/2019  o VASCULAR SURGERY CONSULTATION ([facility]) - 414.00/I25.10, 443.9/I73.9 - 08/13/2019   Kimberly is okay -- used to see Dr. Lau and then Dr. Parnell  o DERMATOLOGY CONSULTATION (DER[facility]) - 709.9/L98.9 - 08/13/2019   [place]  o PSA Screening, Ultrasensitive, MEDICARE [facility] () - V76.44/Z12.5 - 08/13/2019  · Medications  o atorvastatin 20 mg oral tablet   SIG: take 1 tablet (20 mg) by oral route once daily for 90 days   DISP: (90) tablet with 1 refills  Refilled on 08/13/2019     o metoprolol tartrate 50 mg oral tablet   SIG: TAKE 1 TAB DAILY   DISP: (90) tablet with 1 refills  Refilled on 08/13/2019     o quinapril 40 mg oral tablet   SIG: take 1 tablet (40 mg) by oral route once daily for 90 days   DISP: (90) tablet with 1 refills  Refilled on 08/13/2019     · Instructions  o Continue blood sugar monitoring daily and record. Bring your log to office visits. Call the office for readings below 70 and above 250  or any complications.  o Daily foot care. Avoid walking barefoot. Annual Dilated Eye Exam.  o Discussed with patient blood pressure monitoring, hemoglobin A1C levels need to be below 7.0, and LDL (Lipid) goals below 70.  o Patient advised to monitor blood pressure (B/P) at home and journal readings. Patient informed that a B/P reading at home of more than 150/90 is considered too high for him (Age >60). For readings greater qcgf777/90 or higher patient is advised to follow up in the office with readings for management. Patient advised to limit sodium intake.  o Take all medications as prescribed/directed.  o Patient was educated/instructed on their diagnosis, treatment and medications prior to discharge from the clinic today.  o Chronic conditions reviewed and taken into consideration for today's treatment plan.  · Disposition  o Call or Return if symptoms worsen or persist.            Electronically Signed by: RUSS Jordan -Author on August 13, 2019 10:12:09 AM

## 2021-05-07 NOTE — PROGRESS NOTES
Progress Note      Patient Name: Rudy Moore   Patient ID: [id number]   Sex: Male   YOB: 1940        Visit Date: August 9, 2018    Provider: RUSS Bowman   Location: [facility]   Location Address: [address] [place] [address]  [id number]   Location Phone: [phone]          Chief Complaint     here for medication refills and labs.       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 Pain is in the right and left calf with a throbbing pain, painful to walk on, right is worse than left - pt states he is considering getting a 2nd opinion from Cardiology about his blockages       Past Medical History  Disease Name Date Onset Notes   CAD (coronary artery disease) --  --    Cataract --  --    Diabetes mellitus, type 2 --  --    GERD (gastroesophageal reflux disease) --  --    Hyperlipidemia --  --    Hypertension, Benign Essential --  --    Kidney stones --  --    Seasonal allergies --  --          Past Surgical History  Procedure Name Date Notes   Heart Bypass --  --          Medication List  Name Date Started Instructions   atorvastatin 20 mg oral tablet 12/19/2017 take 1 tablet (20  mg) by oral route once daily   metoprolol tartrate 50 mg oral tablet 12/19/2017 TAKE 1 TAB DAILY   OneTouch Ultra Test miscellaneous strip 12/19/2017 use as directed for 90 days   quinapril 40 mg oral tablet 12/19/2017 take 1 tablet (40 mg) by oral route once daily         Allergy List  Allergen Name Date Reaction Notes   NO KNOWN DRUG ALLERGIES --  --  --          Family Medical History  Disease Name Relative/Age Notes   Alcohol Abuse / Father    Father/    Arthrtis / Mother    Mother/    Asthma / Sister    Sister/    Cerebrovascular Accident (CVA) / Brother    Brother/    Chronic Obstructive Pulmonary Disease / Sister    Sister/    DM Type I / Brother    Brother/    Family History Of Breast Cancer / Sister    Sister/          Social History  Finding Status Start/Stop Quantity Notes   Alcohol Current every day --/-- --  drinks alcohol, 8-14 drinks per week   Exercises regularly --  --/-- --  7 times per week   Recreational Drug Use Never --/-- --  never used   Second hand smoke exposure Unknown --/-- --  no   Tobacco Former --/-- 2 PPD former smoker, smokes 1 to 2 packs per day, currently uses other tobacco products   Uses seatbelts --  --/-- --  no         Immunizations  NameDate Admin Mfg Trade Name Lot Number Route Inj VIS Given VIS Publication   [id number]/01/2017 UNK Unknown TradeName [id number] [facility] 12/19/2017 08/07/2015   Comments:    Frcqzku8317/01/2017 [employer] Prevnar 13 [id number] [facility] 12/19/2017 11/05/2015   Comments:          Review of Systems  · Constitutional  o Denies  o : headache  · Cardiovascular  o Denies  o : chest pain, palpitations  · Respiratory  o Denies  o : shortness of breath, cough  · Musculoskeletal  o * See HPI      Vitals  Date Time BP Position Site L\R Cuff Size HR RR TEMP(F) WT  HT  BMI kg/m2 BSA m2 O2 Sat HC       08/09/2018 08:03 /86 Sitting    73 - R  97.2 204lbs 0oz    93 %           Physical Examination  · Constitutional  o Appearance  o : well developed, well-nourished, in no  acute distress  · Eyes  o Conjunctivae  o : conjunctivae normal  o Pupils and Irises  o : pupils equal and round, pupils reactive to light bilaterally  · Neck  o Inspection/Palpation  o : supple  o Thyroid  o : no thyromegaly  · Respiratory  o Respiratory Effort  o : breathing unlabored  o Auscultation of Lungs  o : clear to ascultation  · Cardiovascular  o Heart  o :   § Auscultation of Heart  § : regular rate and rhythm  o Peripheral Vascular System  o :   § Extremities  § : no edema  · Lymphatic  o Neck  o : no lymphadenopathy present  · Musculoskeletal  o General  o :   § General Musculoskeletal  § : No joint swelling or deformity. Muscle tone, strength, and development grossly normal.  · Skin and Subcutaneous Tissue  o General Inspection  o : NL tone  · Neurologic  o Gait and Station  o :   § Gait Screening  § : normal gait  · Psychiatric  o Mood and Affect  o : mood normal, affect appropriate              Assessment  · Diabetes mellitus, type 2     250.00/E11.9  · Essential hypertension     401.9/I10  · Hyperlipidemia     272.4/E78.5  · CAD (coronary artery disease)     414.00/I25.10      Plan  · Orders  o CBC with Auto Diff [facility] ([id number]) - 250.00/E11.9 - 08/09/2018  o CMP [facility] ([id number]) - 250.00/E11.9 - 08/09/2018  o Hgb A1c [facility] ([id number]) - 250.00/E11.9 - 08/09/2018  o Lipid Panel [facility] ([id number]) - 250.00/E11.9 - 08/09/2018  o Urine microalbumin (13574) - 250.00/E11.9 - 08/09/2018  o Urinalysis with Reflex Microscopy if abnormal ([facility]) ([id number]) - 250.00/E11.9, 401.9/I10 - 08/09/2018  o ACO-39: Current medications updated and reviewed () - - 08/09/2018  · Medications  o atorvastatin 20 mg oral tablet   SIG: take 1 tablet (20 mg) by oral route once daily for 90 days   DISP: (90) tablet with 1 refills  Adjusted on 08/09/2018     o metoprolol tartrate 50 mg oral tablet   SIG: TAKE 1 TAB DAILY   DISP: (90) tablet with 1 refills  Adjusted on 08/09/2018     o quinapril 40 mg oral tablet   SIG: take 1 tablet (40 mg) by oral  route once daily for 90 days   DISP: (90) tablet with 1 refills  Adjusted on 08/09/2018     · Instructions  o Daily foot care. Avoid walking barefoot. Annual Dilated Eye Exam.  o Advised that cheeses and other sources of dairy fats, animal fats, fast food, and the extras (candy, pasteries, pies, doughnuts and cookies) all contain LDL raising nutrients. Advised to increase fruits, vegetables, whole grains, and to monitor portion sizes.   o Take all medications as prescribed/directed.  o Patient was educated/instructed on their diagnosis, treatment and medications prior to discharge from the clinic today.  o Bring all medicines with their bottles to each office visit.  · Disposition  o Follow up as needed.            Electronically Signed by: RUSS Bowman -Author on August 9, 2018 08:47:55 AM

## 2021-05-07 NOTE — PROGRESS NOTES
Progress Note      Patient Name: Rudy Moore   Patient ID: [id number]   Sex: Male   YOB: 1940        Visit Date: July 23, 2020    Provider: RUSS Jordan   Location: [facility]   Location Address: [address] [place] [address] [place]  [address]   Location Phone: [phone]          Chief Complaint     refills       History Of Present Illness  Rudy Moore is a 79 year old /White male who presents for evaluation and treatment of:      follow up on chronic health conditions and medication refills     BP is good today - he is taking all of his medication as prescribed - No chest pain or palpitations. No c/o headaches, dizziness, or vision changes. He does have some mild orthostatic sxs' - when he bends down - He denies any swelling in the BLE - they do hurt all the time from his PAD - he is seeing Dr. Parnell at [facility] and she now has him taking Pletal - he can't really see that is it helping. They are planning a carotid endarterectomy on August 3rd on the right - then later they will discuss what to do with his legs - he is requesting a handicap form d/t pain with walking long distances - he doesn't have to take many steps before his legs will start hurting. He will get a catch and an ache in the calves - the pain can be as high as a 10 and he will have to sit down just to get it to fade away.     DM - states his BG stays around the 120s to 140s. Denies any changes in vision - last eye exam was last year - used to be Dr. Dumont but is now something else. No numbness or tingling in his hands and feet to speak of. Denies any polyuria, polydipsia, or polyphagia.            Past Medical History  Disease Name Date Onset Notes   CAD (coronary artery disease) --  --    Cataract --  --    Diabetes mellitus, type 2 --  --    Essential hypertension 07/23/2020 --    GERD (gastroesophageal reflux disease) --  --    Hyperlipidemia --  --    Kidney stones  --  --    Peripheral arterial disease 07/23/2020 --    Seasonal allergies --  --          Past Surgical History  Procedure Name Date Notes   Heart Bypass --  --          Medication List  Name Date Started Instructions   Aspir-81 81 mg oral tablet,delayed release (DR/EC)  --    atorvastatin 20 mg oral tablet 07/23/2020 take 1 tablet (20 mg) by oral route once daily for 90 days   BD Alcohol Swabs topical pads, medicated 05/08/2020 USE EVERY DAY   cilostazol 100 mg oral tablet  take 1 tablet (100 mg) by oral route 2 times per day 1/2 hour before or 2 hours after breakfast and dinner   metoprolol tartrate 50 mg oral tablet 07/23/2020 TAKE 1 TAB DAILY   quinapril 40 mg oral tablet 07/23/2020 take 1 tablet (40 mg) by oral route once daily for 90 days   True Comfort Lancet 30 gauge miscellaneous misc 05/01/2019 test twice daily   True Metrix Air Glucose Meter miscellaneous kit 04/26/2019 check blood sugar twice a day   True Metrix Air Glucose Meter miscellaneous kit 03/16/2020 CHECK BLOOD SUGAR TWICE DAILY   True Metrix Glucose Test Strip miscellaneous strip 05/08/2020 TEST GLUCOSE TWICE DAILY         Allergy List  Allergen Name Date Reaction Notes   NO KNOWN DRUG ALLERGIES --  --  --          Family Medical History  Disease Name Relative/Age Notes   Asthma Sister/   Sister   Chronic Obstructive Pulmonary Disease Sister/   Sister   DM Type I Brother/   Brother   Arthrtis Mother/   Mother   Alcohol abuse Father/   Father   Family History Of Breast Cancer Sister/   Sister   Cerebrovascular Accident (CVA) Brother/   Brother         Social History  Finding Status Start/Stop Quantity Notes   Alcohol Current every day --/-- --  drinks alcohol, 8-14 drinks per week   Exercises regularly --  --/-- --  7 times per week   Recreational Drug Use Never --/-- --  never used   Second hand smoke exposure Unknown --/-- --  no   Tobacco Former --/-- 2 PPD former smoker, smokes 1 to 2 packs per day, currently uses other tobacco products  "  Uses seatbelts --  --/-- --  no         Immunizations  NameDate Admin Mfg Trade Name Lot Number Route Inj VIS Given VIS Publication   Twxromdnr29/01/2017 UNK Unknown TradeName [id number] NE NE 12/19/2017 08/07/2015   Comments:    Vnyxkft5241/01/2017 WAL PREVNAR 13 [id number] [facility] 12/19/2017 11/05/2015   Comments:          Review of Systems  · Constitutional  o Admits  o : good general health lately  o Denies  o : fatigue, fever, chills, body aches  · Eyes  o Denies  o : blurred vision, changes in vision  · HENT  o Denies  o : headaches, vertigo, lightheadedness  · Cardiovascular  o Admits  o : claudication  o Denies  o : chest pain, syncope, dyspnea on exertion, lower extremity edema, palpitations  · Respiratory  o Denies  o : shortness of breath, cough  · Gastrointestinal  o Denies  o : nausea, vomiting, diarrhea, constipation, abdominal pain  · Genitourinary  o Denies  o : dysuria, urinary retention, difficulty voiding, urinary hesitancy, decreased stream, post-void dribbling  · Integument  o Denies  o : rash, pigmentation changes  · Neurologic  o Denies  o : tingling or numbness, dizziness  · Musculoskeletal  o Denies  o : joint pain, joint swelling  · Endocrine  o Denies  o : polyuria, polydipsia, cold intolerance, heat intolerance      Vitals  Date Time BP Position Site L\R Cuff Size HR RR TEMP (F) WT  HT  BMI kg/m2 BSA m2 O2 Sat        07/23/2020 09:06 /80 Sitting    76 - R  97.7 210lbs 0oz 5'  9.5\" 30.57 2.16 100 %          Physical Examination  · Constitutional  o Appearance  o : well developed, well-nourished, in no acute distress  · Eyes  o Conjunctivae  o : conjunctivae normal, no exudates present  · Neck  o Inspection/Palpation  o : normal appearance, no masses or tenderness, trachea midline  o Thyroid  o : no thyromegaly  · Respiratory  o Respiratory Effort  o : breathing unlabored  o Auscultation of Lungs  o : clear to auscultation  · Cardiovascular  o Heart  o :   § Auscultation of Heart  § : " regular rate, normal rhythm, systolic murmur present  o Peripheral Vascular System  o :   § Carotid Arteries  § : normal pulses bilaterally, bilateral bruits present   § Pedal Pulses  § : bilateral pulse strength 2+  § Extremities  § : no edema  · Lymphatic  o Neck  o : no lymphadenopathy present  · Musculoskeletal  o General  o :   § General Musculoskeletal  § : Muscle tone, strength, and development grossly normal.  · Skin and Subcutaneous Tissue  o General Inspection  o : no lesions present, no areas of discoloration, skin turgor normal, texture normal  · Neurologic  o Gait and Station  o :   § Gait Screening  § : normal gait  · Psychiatric  o Mood and Affect  o : mood normal, affect appropriate  · Left DM Foot Exam  o Sensation  o : normal sensory exam perceptible to 10-gram nylon monofilament exam (5/5), vibration and light touch.  o Visual Inspection  o : visual inspection is normal with no signs of breakdown, ulcerations or deformities unless otherwise noted.   o Vascular  o : palpable dorsalis pedis and posterir tibialis pulses present, normal capillary refill  · Right DM Foot Exam  o Sensation  o : normal sensory exam perceptible to 10-gram nylon monofilament exam (5/5), vibration and light touch.  o Visual Inspection  o : visual inspection is normal with no signs of breakdown, ulcerations or deformities unless otherwise noted.   o Vascular  o : palpable dorsalis pedis and posterir tibialis pulses present, normal capillary refill          Assessment  · Diabetes mellitus, type 2     250.00/E11.9  · Essential hypertension     401.9/I10  · Hyperlipidemia     272.4/E78.5  · Prostate cancer screening     V76.44/Z12.5  · CAD (coronary artery disease)     414.00/I25.10  · Peripheral arterial disease     443.9/I73.9    Problems Reconciled  Plan  · Orders  o Diabetic Foot (Motor and Sensory) Exam Completed [facility] (, , 2028F) - 250.00/E11.9 - 07/23/2020  o CBC with Auto Diff [facility] ([id number]) - 401.9/I10,  272.4/E78.5, 250.00/E11.9 - 07/23/2020  o CMP [facility] ([id number]) - 401.9/I10, 272.4/E78.5, 250.00/E11.9 - 07/23/2020  o Hgb A1c [facility] (12946) - 250.00/E11.9 - 07/23/2020  o Lipid Panel [facility] (47339) - 401.9/I10, 272.4/E78.5, 250.00/E11.9 - 07/23/2020  o Urinalysis with Reflex Microscopy if abnormal ([facility]) (96475) - 401.9/I10, 250.00/E11.9 - 07/23/2020  o Urine microalbumin (26097) - 401.9/I10, 250.00/E11.9 - 07/23/2020  o ACO-39: Current medications updated and reviewed () - - 07/23/2020  o PSA Screening, Ultrasensitive, MEDICARE [facility] () - V76.44/Z12.5 - 08/14/2020  · Medications  o atorvastatin 20 mg oral tablet   SIG: take 1 tablet (20 mg) by oral route once daily for 90 days   DISP: (90) tablet with 1 refills  Refilled on 07/23/2020     o metoprolol tartrate 50 mg oral tablet   SIG: TAKE 1 TAB DAILY   DISP: (90) tablet with 1 refills  Refilled on 07/23/2020     o quinapril 40 mg oral tablet   SIG: take 1 tablet (40 mg) by oral route once daily for 90 days   DISP: (90) tablet with 1 refills  Refilled on 07/23/2020     o Medications have been Reconciled  o Transition of Care or Provider Policy  · Instructions  o Continue blood sugar monitoring daily and record. Bring your log to office visits. Call the office for readings below 70 and above 250 or any complications.  o Daily foot care. Avoid walking barefoot. Annual Dilated Eye Exam.  o Discussed with patient blood pressure monitoring, hemoglobin A1C levels need to be below 7.0, and LDL (Lipid) goals below 70.  o Patient advised to monitor blood pressure (B/P) at home and journal readings. Patient informed that a B/P reading at home of more than 135/85 is considered hypertension. For readings greater yufr028/90 or higher patient is advised to follow up in the office with readings for management. Patient advised to limit sodium intake.  o Take all medications as prescribed/directed.  o Patient was educated/instructed on their diagnosis, treatment and medications prior  to discharge from the clinic today.  o Chronic conditions reviewed and taken into consideration for today's treatment plan.  · Disposition  o Call or Return if symptoms worsen or persist.            Electronically Signed by: RUSS Jordan -Author on July 23, 2020 09:42:56 AM

## 2021-05-07 NOTE — PROGRESS NOTES
"   Progress Note      Patient Name: Rudy Moore   Patient ID: [id number]   Sex: Male   YOB: 1940    Referring Provider: Kim SOLANO    Visit Date: January 20, 2020    Provider: RUSS Jordan   Location: [facility]   Location Address: [address] [place] [address]   [place]  [address]   Location Phone: [phone]          Chief Complaint     refills       History Of Present Illness  Rudy Moore is a 79 year old /White male who presents for evaluation and treatment of:      follow up on chronic health conditions and medication refills.     BP is up a little today -states he has not taken his medication d/t fasting status for labs - he does not believe that he is having any trouble with his medications - he has taken them for the past 10-20 years. No chest pain or palpitations. No c/o headaches, dizziness, or vision changes. He denies any swelling in the BLE - they do hurt all the time from his PAD - he saw Dr. Paula Parnell at [facility] and she wanted him to have Carotid Doppler and ABIs, but he states they never called him with the appointment, so he never had that done.     DM - he used to take metformin but \"they took me off of it\" - states his BG stays around the 120s. Denies any changes in vision - last eye exam was just under 1 year ago. No numbness or tingling in his hands and feet to speak of. Denies any polyuria, polydipsia, or polyphagia.     He has had a new mole come up on the side of his head on left - he has a dermatologist but hasn't seen her - came up about 6 months ago - not any bigger, that he knows of - he can't see it to see if the color or appearance has changed.       Past Medical History  Disease Name Date Onset Notes   CAD (coronary artery disease) --  --    Cataract --  --    Diabetes mellitus, type 2 --  --    GERD (gastroesophageal reflux disease) --  --    Hyperlipidemia --  --    Hypertension, Benign Essential " --  --    Kidney stones --  --    Seasonal allergies --  --          Past Surgical History  Procedure Name Date Notes   Heart Bypass --  --          Medication List  Name Date Started Instructions   Aspir-81 81 mg oral tablet,delayed release (DR/EC)  --    atorvastatin 20 mg oral tablet 01/20/2020 take 1 tablet (20 mg) by oral route once daily for 90 days   BD Alcohol Swabs topical pads, medicated 05/08/2020 USE EVERY DAY   cilostazol 100 mg oral tablet  take 1 tablet (100 mg) by oral route 2 times per day 1/2 hour before or 2 hours after breakfast and dinner   metoprolol tartrate 50 mg oral tablet 01/20/2020 TAKE 1 TAB DAILY   quinapril 40 mg oral tablet 01/20/2020 take 1 tablet (40 mg) by oral route once daily for 90 days   True Comfort Lancet 30 gauge miscellaneous misc 05/01/2019 test twice daily   True Metrix Air Glucose Meter miscellaneous kit 04/26/2019 check blood sugar twice a day   True Metrix Air Glucose Meter miscellaneous kit 03/16/2020 CHECK BLOOD SUGAR TWICE DAILY   True Metrix Glucose Test Strip miscellaneous strip 05/08/2020 TEST GLUCOSE TWICE DAILY         Allergy List  Allergen Name Date Reaction Notes   NO KNOWN DRUG ALLERGIES --  --  --          Family Medical History  Disease Name Relative/Age Notes   Asthma Sister/   Sister   Chronic Obstructive Pulmonary Disease Sister/   Sister   DM Type I Brother/   Brother   Arthrtis Mother/   Mother   Alcohol abuse Father/   Father   Family History Of Breast Cancer Sister/   Sister   Cerebrovascular Accident (CVA) Brother/   Brother         Social History  Finding Status Start/Stop Quantity Notes   Alcohol Current every day --/-- --  drinks alcohol, 8-14 drinks per week   Exercises regularly --  --/-- --  7 times per week   Recreational Drug Use Never --/-- --  never used   Second hand smoke exposure Unknown --/-- --  no   Tobacco Former --/-- 2 PPD former smoker, smokes 1 to 2 packs per day, currently uses other tobacco products   Uses seatbelts --   "--/-- --  no         Immunizations  NameDate Admin Mfg Trade Name Lot Number Route Inj VIS Given VIS Publication   Bzkcotlql10/01/2017 UNK Unknown TradeName [id number] NE NE 12/19/2017 08/07/2015   Comments:    Qlljctl2913/01/2017 WAL PREVNAR 13 [id number] NE NE 12/19/2017 11/05/2015   Comments:          Review of Systems  · Constitutional  o Admits  o : good general health lately  o Denies  o : fatigue, fever, chills  · Eyes  o Denies  o : changes in vision  · HENT  o Denies  o : headaches, vertigo, lightheadedness  · Cardiovascular  o Admits  o : claudication  o Denies  o : chest pain, dyspnea on exertion, lower extremity edema, palpitations  · Respiratory  o Denies  o : shortness of breath, cough  · Gastrointestinal  o Denies  o : nausea, vomiting, diarrhea, abdominal pain  · Genitourinary  o Denies  o : dysuria, urinary retention  · Integument  o Admits  o : new skin lesions  o Denies  o : pigmentation changes  · Neurologic  o Denies  o : tingling or numbness, dizziness  · Musculoskeletal  o Admits  o : Leg cramps with walking bilaterally  o Denies  o : joint pain  · Endocrine  o Denies  o : polyuria, polydipsia, cold intolerance, heat intolerance  · Psychiatric  o Denies  o : anxiety, depression      Vitals  Date Time BP Position Site L\R Cuff Size HR RR TEMP (F) WT  HT  BMI kg/m2 BSA m2 O2 Sat        01/20/2020 09:45 /84 Sitting    110 - R  95.7 210lbs 0oz 5'  9\" 31.01 2.15 93 %          Physical Examination  · Constitutional  o Appearance  o : well developed, well-nourished, in no acute distress  · Eyes  o Conjunctivae  o : conjunctivae normal, no exudates present  · Neck  o Inspection/Palpation  o : normal appearance, no masses or tenderness, trachea midline  o Thyroid  o : no thyromegaly  · Respiratory  o Respiratory Effort  o : breathing unlabored  o Auscultation of Lungs  o : clear to ascultation  · Cardiovascular  o Heart  o :   § Auscultation of Heart  § : regular rate, normal rhythm, no murmurs " present  o Peripheral Vascular System  o :   § Carotid Arteries  § : normal pulses bilaterally, soft bruit present on the right  § Pedal Pulses  § : bilateral pulse strength 1+   § Extremities  § : no edema  · Gastrointestinal  o Abdominal Examination  o :   § Abdomen  § : soft, non-tender, non-distended, bowel sounds +  · Lymphatic  o Neck  o : no lymphadenopathy present  · Musculoskeletal  o General  o :   § General Musculoskeletal  § : Muscle tone, strength, and development grossly normal.  · Skin and Subcutaneous Tissue  o General Inspection  o : on the left side of his head, right in the region of the temple, there is an approximate 0.5cm circumscribed lesion - scaly/rough in appearance - white-colored pigmentation  · Neurologic  o Gait and Station  o :   § Gait Screening  § : normal gait  · Psychiatric  o Mood and Affect  o : mood normal, affect appropriate          Assessment  · Diabetes mellitus, type 2     250.00/E11.9  · Essential hypertension     401.9/I10  · Hyperlipidemia     272.4/E78.5  · CAD (coronary artery disease)     414.00/I25.10  · Peripheral vascular disease with claudication     443.9/I73.9  · Carotid artery stenosis without cerebral infarction, right     433.10/I65.21  · Status post femoral-popliteal bypass surgery     V45.89/Z95.828  · Changing skin lesion     709.9/L98.9    Problems Reconciled  Plan  · Orders  o CBC with Auto Diff [facility] (43271) - 250.00/E11.9, 272.4/E78.5, 401.9/I10 - 01/20/2020  o CMP [facility] (75758) - 250.00/E11.9, 272.4/E78.5, 401.9/I10 - 01/20/2020  o Hgb A1c [facility] (72036) - 250.00/E11.9 - 01/20/2020  o Lipid Panel [facility] (37736) - 250.00/E11.9, 272.4/E78.5, 401.9/I10 - 01/20/2020  o Thyroid Profile (THYII) - 250.00/E11.9, 401.9/I10 - 01/20/2020  o Urinalysis with Reflex Microscopy if abnormal ([facility]) (86790) - 401.9/I10, 250.00/E11.9 - 01/20/2020  o Urine microalbumin (12078) - 401.9/I10, 250.00/E11.9 - 01/20/2020  o ACO-13: Fall Risk Screening with no falls in past year or only  one fall without injury in the past year (1101F) - - 01/20/2020  o ACO-39: Current medications updated and reviewed () - - 01/20/2020  o DERMATOLOGY CONSULTATION (DERMA) - 709.9/L98.9 - 01/20/2020   Known to Rianna cowan [facility] in Dermatology  o Ankle brachial index (12212) - 414.00/I25.10, 443.9/I73.9, V45.89/Z95.828 - 01/20/2020  o Aortic ultrasound (73411) - 272.4/E78.5, 414.00/I25.10, 401.9/I10, 433.10/I65.21 - 01/20/2020  o Carotid Doppler Bilateral [facility] (03392) - 272.4/E78.5, 414.00/I25.10, 401.9/I10, 433.10/I65.21 - 01/20/2020  o Arterial duplex scan, lower extremities, complete (13056) - 443.9/I73.9, V45.89/Z95.828 - 01/20/2020  · Medications  o atorvastatin 20 mg oral tablet   SIG: take 1 tablet (20 mg) by oral route once daily for 90 days   DISP: (90) tablet with 1 refills  Refilled on 01/20/2020     o metoprolol tartrate 50 mg oral tablet   SIG: TAKE 1 TAB DAILY   DISP: (90) tablet with 1 refills  Refilled on 01/20/2020     o quinapril 40 mg oral tablet   SIG: take 1 tablet (40 mg) by oral route once daily for 90 days   DISP: (90) tablet with 1 refills  Refilled on 01/20/2020     o Medications have been Reconciled  o Transition of Care or Provider Policy  · Instructions  o Continue blood sugar monitoring daily and record. Bring your log to office visits. Call the office for readings below 70 and above 250 or any complications.  o Daily foot care. Avoid walking barefoot. Annual Dilated Eye Exam.  o Discussed with patient blood pressure monitoring, hemoglobin A1C levels need to be below 7.0, and LDL (Lipid) goals below 70.  o Patient advised to monitor blood pressure (B/P) at home and journal readings. Patient informed that a B/P reading at home of more than 135/85 is considered hypertension. For readings greater slwp712/90 or higher patient is advised to follow up in the office with readings for management. Patient advised to limit sodium intake.  o Take all medications as  prescribed/directed.  o Patient was educated/instructed on their diagnosis, treatment and medications prior to discharge from the clinic today.  o Chronic conditions reviewed and taken into consideration for today's treatment plan.  · Disposition  o Call or Return if symptoms worsen or persist.            Electronically Signed by: RUSS Jordan -Author on July 23, 2020 09:26:45 AM

## 2021-05-07 NOTE — PROGRESS NOTES
Progress Note      Patient Name: Rudy Moore   Patient ID: [id number]   Sex: Male   YOB: 1940        Visit Date: March 23, 2021    Provider: RUSS Jordan   [place] [facility] [place] [address] [place] [address] [place]  [address]   Location Phone: [phone]          Chief Complaint     Refills       History Of Present Illness  Rudy Moore is a 80 year old /White male who presents for evaluation and treatment of:      follow up on chronic health conditions and medication refills    HTN with HLD status post right carotid endarterectomy on 8/3/2020 - Dr. Parnell with [facility] - as well as bilateral femoral endarterectomies with iliac stenting 10/2020 - last saw in Nov/Dec - he was to follow up in 3 months but they haven't called him to tell him when that is - He states that his legs are better but not perfect - he doesn't have to stop near as much as he used to.     DM - diet controlled - last A1C was in July of last year and 6.1% - his blood sugars are running about 120-130 - his last eye exam was a little over a year ago. No c/o neuropathy in the fingers/toes/feet.     Last PSA in October of 2020 and normal -     Last CRC screening was 2012 - colon polyp removed on c'scope - he would like to continue CRC screening with use of Cologuard.     Depression screening - PHQ-9 score is 0 - denies depression or needing treatment for depression     Advanced Care Planning - no record of advanced directive/living will on chart - Should something happen to him and he could not speak on his own behalf, he would name his wife, Angelika Moore, to speak for him. If she is not available, then he would designate his son and daughter, Bubba Moore and Aurelia Aden, to speak for him. He does not want to be kept alive on artificial ventillation or nutrition if there is no chance for a full recovery.       Past Medical History  Disease Name  Date Onset Notes   CAD (coronary artery disease) --  --    Cataract --  --    Diabetes mellitus, type 2 --  --    Essential hypertension 07/23/2020 --    GERD (gastroesophageal reflux disease) --  --    Hyperlipidemia --  --    Kidney stones --  --    Peripheral arterial disease 07/23/2020 --    Seasonal allergies --  --          Past Surgical History  Procedure Name Date Notes   Heart Bypass --  --          Medication List  Name Date Started Instructions   Aspir-81 81 mg oral tablet,delayed release (DR/EC)  --    atorvastatin 20 mg oral tablet 03/23/2021 TAKE 1 TABLET EVERY DAY   BD Alcohol Swabs topical pads, medicated 05/08/2020 USE EVERY DAY   BD Alcohol Swabs topical pads, medicated 03/23/2021 to use daily with blood glucose checks   cilostazol 100 mg oral tablet  take 1 tablet (100 mg) by oral route 2 times per day 1/2 hour before or 2 hours after breakfast and dinner   metoprolol tartrate 50 mg oral tablet 03/23/2021 TAKE 1 TABLET EVERY DAY   quinapril 40 mg oral tablet 03/23/2021 take 1 tablet (40 mg) by oral route once daily for 90 days   True Comfort Lancet 30 gauge miscellaneous misc 03/23/2021 test twice daily   True Metrix Glucose Test Strip miscellaneous strip 03/16/2020 TEST GLUCOSE TWICE DAILY   True Metrix Glucose Test Strip miscellaneous strip 03/23/2021 Check BG BID         Allergy List  Allergen Name Date Reaction Notes   NO KNOWN DRUG ALLERGIES --  --  --          Family Medical History  Disease Name Relative/Age Notes   Asthma Sister/   Sister   Chronic Obstructive Pulmonary Disease Sister/   Sister   DM Type I Brother/   Brother   Arthrtis Mother/   Mother   Alcohol abuse Father/   Father   Family History Of Breast Cancer Sister/   Sister   Cerebrovascular Accident (CVA) Brother/   Brother         Social History  Finding Status Start/Stop Quantity Notes   Alcohol Current every day --/-- --  drinks alcohol, 8-14 drinks per week   Exercises regularly --  --/-- --  7 times per week   Recreational  "Drug Use Never --/-- --  never used   Second hand smoke exposure Unknown --/-- --  no   Tobacco Former --/-- 2 PPD former smoker, smokes 1 to 2 packs per day, currently uses other tobacco products   Uses seatbelts --  --/-- --  no         Immunizations  NameDate Admin Mfg Trade Name Lot Number Route Inj VIS Given VIS Publication   COVID Rcbcfak6303/11/2021 MOD Moderna COVID-19 Vaccine  NE NE 03/23/2021    Comments: given elesewhere   COVID Axitecx6602/09/2021 MOD Moderna COVID-19 Vaccine  NE NE 03/23/2021    Comments: given elesewhere   Jsxwaouef22/01/2017 UNK Unknown TradeName [id number] NE NE 12/19/2017 08/07/2015   Comments:    Hbrfpua8061/01/2017 WAL PREVNAR 13 [id number] NE NE 12/19/2017 11/05/2015   Comments:          Review of Systems  · Constitutional  o Admits  o : good general health lately  o Denies  o : fatigue, fever, chills  · Eyes  o Denies  o : double vision, blurred vision, changes in vision  · HENT  o Denies  o : headaches, chronic sinus problem  · Cardiovascular  o Admits  o : claudication  o Denies  o : chest pain, syncope, dyspnea on exertion, lower extremity edema, lightheadedness, palpitations  · Respiratory  o Denies  o : shortness of breath, cough  · Gastrointestinal  o Denies  o : nausea, vomiting, diarrhea, constipation, abdominal pain, blood in stools, hematochezia  · Genitourinary  o Denies  o : dysuria, urinary retention, difficulty voiding, urinary hesitancy, decreased stream  · Integument  o Denies  o : rash, pigmentation changes, nail changes  · Neurologic  o Denies  o : dizziness  · Endocrine  o Denies  o : polyuria, polydipsia, cold intolerance, heat intolerance  · Psychiatric  o Denies  o : anxiety, depression, difficulty sleeping, suicidal ideation, homicidal ideation      Vitals  Date Time BP Position Site L\R Cuff Size HR RR TEMP (F) WT  HT  BMI kg/m2 BSA m2 O2 Sat FR L/min FiO2 HC       03/23/2021 10:01 /60 Sitting    79 - R  97.1 212lbs 3oz 5'  9\" 31.33 2.16 96 %    "         Physical Examination  · Constitutional  o Appearance  o : overweight, well developed, alert, in no acute distress, well-tended appearance  · Head and Face  o HEENT  o : Unremarkable - wearing a mask  · Eyes  o Conjunctivae  o : conjunctivae normal, no exudates present  · Neck  o Inspection/Palpation  o : normal appearance, no masses or tenderness, trachea midline  o Thyroid  o : no thyromegaly  · Respiratory  o Respiratory Effort  o : breathing unlabored  o Auscultation of Lungs  o : clear to auscultation  · Cardiovascular  o Heart  o :   § Auscultation of Heart  § : regular rate, normal rhythm, systolic murmur present, no pericardial friction rub  o Peripheral Vascular System  o :   § Carotid Arteries  § : normal pulses bilaterally, no bruits present  § Pedal Pulses  § : bilateral pulse strength 1+   § Extremities  § : no edema  · Lymphatic  o Neck  o : no lymphadenopathy present  · Musculoskeletal  o General  o :   § General Musculoskeletal  § : Muscle tone, strength, and development grossly normal.  · Skin and Subcutaneous Tissue  o General Inspection  o : no lesions present, no areas of discoloration, skin turgor normal, texture normal  · Neurologic  o Gait and Station  o :   § Gait Screening  § : normal gait  · Psychiatric  o Mood and Affect  o : mood normal, affect appropriate          Assessment  · Diabetes mellitus, type 2     250.00/E11.9  · Essential hypertension     401.9/I10  · Hyperlipidemia     272.4/E78.5  · Peripheral arterial disease     443.9/I73.9  · CAD (coronary artery disease)     414.00/I25.10  · Advanced care planning/counseling discussion     V65.49/Z71.89  · Colon cancer screening     V76.51/Z12.11  · History of colon polyps     V12.72/Z86.010      Plan  · Orders  o CBC with Auto Diff [facility] ([id number]) - 401.9/I10, 272.4/E78.5, 250.00/E11.9 - 03/23/2021  o CMP [facility] (08625) - 401.9/I10, 272.4/E78.5, 250.00/E11.9 - 03/23/2021  o Hgb A1c [facility] ([id number]) - 250.00/E11.9 - 03/23/2021  o Lipid Panel  [facility] (15644) - 401.9/I10, 272.4/E78.5, 250.00/E11.9 - 03/23/2021  o Thyroid Profile (THYII) - 401.9/I10, 250.00/E11.9 - 03/23/2021  o Urinalysis with Reflex Microscopy if abnormal ([facility]) (46972) - 401.9/I10, 250.00/E11.9 - 03/23/2021  o Urine microalbumin (02186) - 401.9/I10, 250.00/E11.9 - 03/23/2021  o ACO-13: Fall Risk Screening with no falls in past year or only one fall without injury in the past year (1101F) - - 03/23/2021  o ACO-18: Negative screen for clinical depression using a standardized tool () - - 03/23/2021  o ACO-39: Current medications updated and reviewed (1159F, ) - - 03/23/2021  o ACO - Advance Care Plan or Surrogate Decision Maker documented in EMR (1123F) - - 03/23/2021  o Cologuard (, 14198) - V76.51/Z12.11 - 03/23/2021  · Medications  o BD Alcohol Swabs topical pads, medicated   SIG: to use daily with blood glucose checks   DISP: (100) Swab with 1 refills  Adjusted on 03/23/2021     o quinapril 40 mg oral tablet   SIG: take 1 tablet (40 mg) by oral route once daily for 90 days   DISP: (90) Tablet with 1 refills  Adjusted on 03/23/2021     o True Metrix Glucose Test Strip miscellaneous strip   SIG: Check BG BID   DISP: (200) Strip with 1 refills  Adjusted on 03/23/2021     o atorvastatin 20 mg oral tablet   SIG: TAKE 1 TABLET EVERY DAY   DISP: (90) Tablet with 1 refills  Refilled on 03/23/2021     o metoprolol tartrate 50 mg oral tablet   SIG: TAKE 1 TABLET EVERY DAY   DISP: (90) Tablet with 1 refills  Refilled on 03/23/2021     o True Comfort Lancet 30 gauge miscellaneous misc   SIG: test twice daily   DISP: (200) Lancet with 1 refills  Refilled on 03/23/2021     o True Metrix Air Glucose Meter miscellaneous kit   SIG: check blood sugar twice a day   DISP: (1) Kit with 0 refills  Discontinued on 03/23/2021     o True Metrix Air Glucose Meter miscellaneous kit   SIG: CHECK BLOOD SUGAR TWICE DAILY   DISP: (1) Kit with -1 refills  Discontinued on 03/23/2021     o Medications have  been Reconciled  o Transition of Care or Provider Policy  · Instructions  o Continue blood sugar monitoring daily and record. Bring your log to office visits. Call the office for readings below 70 and above 250 or any complications.  o Daily foot care. Avoid walking barefoot. Annual Dilated Eye Exam.  o Discussed with patient blood pressure monitoring, hemoglobin A1C levels need to be below 7.0, and LDL (Lipid) goals below 70.  o Patient advised to monitor blood pressure (B/P) at home and journal readings. Patient informed that a B/P reading at home of more than 135/85 is considered hypertension. For readings greater hren896/90 or higher patient is advised to follow up in the office with readings for management. Patient advised to limit sodium intake.  o Take all medications as prescribed/directed.  o Patient was educated/instructed on their diagnosis, treatment and medications prior to discharge from the clinic today.  o Chronic conditions reviewed and taken into consideration for today's treatment plan.  · Disposition  o Call or Return if symptoms worsen or persist.            Electronically Signed by: RUSS Jordan -Author on March 23, 2021 11:12:08 AM

## 2021-05-07 NOTE — PROGRESS NOTES
Progress Note      Patient Name: Rudy Moore   Patient ID: [id number]   Sex: Male   YOB: 1940        Visit Date: February 13, 2019    Provider: RUSS Jordan   Location: [facility]   Location Address: [address] [place] [address]  [id number]   Location Phone: [phone]          Chief Complaint     refills       History Of Present Illness  Rudy Moore is a 78 year old /White male who presents for evaluation and treatment of:      chronic health conditions and medication refills.     PAD--affecting bilateral lower extremities and the right carotid artery--he has intermittent claudication--he was on medication at one time but he states it didn't seem to do any good. He was initially seen by Dr. Lau, then switched to Dr. Parnell when he retired--now Dr. Parnell is gone; he has not had any follow up in 6 months or better--he was supposed to follow up with her Q6 months, but since she told him the surgery was too risky, he didn't see a need.     Flu shot is up to date--got that at [facility] this past fall.     Depression screening negative; PHQ-9 is 3.     HTN--no c/o chest pain, palpitations, or headaches--he gets dizzy occasionally when he beds over, but Dr. Parnell told him that was to be expected. He has 2 stents in his heart--they were put in in July 2001 at [facility]. No swelling in the legs. No rapid heart rate, or slow heart rate. He denies any sleep apnea--no snoring, no witnessed apnea.     HLD--unknown control.     He has DM--used to take metformin, but hasn't in a year d/t being diet controlled--he tests daily and it runs in the 120s usually. His last eye exam was 3 months ago--[place]--[facility]. He denies any trouble with his feet; he does not require diabetic shoes.     Sees [facility] in [place]--gets PSA there.     Hx of colon polyps in 2012--Dr. Sandhu--told him to follow up in 10 years.       Past Medical History  Disease Name Date  Onset Notes   CAD (coronary artery disease) --  --    Cataract --  --    Diabetes mellitus, type 2 --  --    GERD (gastroesophageal reflux disease) --  --    Hyperlipidemia --  --    Hypertension, Benign Essential --  --    Kidney stones --  --    Seasonal allergies --  --          Past Surgical History  Procedure Name Date Notes   Heart Bypass --  --          Medication List  Name Date Started Instructions   atorvastatin 20 mg oral tablet 08/09/2018 take 1 tablet (20 mg) by oral route once daily for 90 days   metoprolol tartrate 50 mg oral tablet 08/09/2018 TAKE 1 TAB DAILY   quinapril 40 mg oral tablet 08/09/2018 take 1 tablet (40 mg) by oral route once daily for 90 days         Allergy List  Allergen Name Date Reaction Notes   NO KNOWN DRUG ALLERGIES --  --  --          Family Medical History  Disease Name Relative/Age Notes   Alcohol Abuse / Father    Father/    Arthrtis / Mother    Mother/    Asthma / Sister    Sister/    Cerebrovascular Accident (CVA) / Brother    Brother/    Chronic Obstructive Pulmonary Disease / Sister    Sister/    DM Type I / Brother    Brother/    Family History Of Breast Cancer / Sister    Sister/          Social History  Finding Status Start/Stop Quantity Notes   Alcohol Current every day --/-- --  drinks alcohol, 8-14 drinks per week   Exercises regularly --  --/-- --  7 times per week   Recreational Drug Use Never --/-- --  never used   Second hand smoke exposure Unknown --/-- --  no   Tobacco Former --/-- 2 PPD former smoker, smokes 1 to 2 packs per day, currently uses other tobacco products   Uses seatbelts --  --/-- --  no         Immunizations  NameDate Admin Mfg Trade Name Lot Number Route Inj VIS Given VIS Publication   Ivownauhk99/01/2017 UNK Unknown TradeName [id number] [facility] 12/19/2017 08/07/2015   Comments:    Mplmyyl7953/01/2017 WAL Prevnar 13 [id number] NE NE 12/19/2017 11/05/2015   Comments:          Review of Systems  · Constitutional  o Admits  o : good general health  "lately  o Denies  o : fatigue, fever, chills  · Eyes  o Denies  o : changes in vision  · HENT  o Admits  o : headaches  o Denies  o : vertigo, lightheadedness  · Cardiovascular  o Admits  o : claudication  o Denies  o : chest pain, dyspnea on exertion, lower extremity edema, palpitations  · Respiratory  o Denies  o : shortness of breath, wheezing, cough  · Gastrointestinal  o Denies  o : nausea, vomiting, diarrhea, constipation, abdominal pain  · Genitourinary  o Denies  o : dysuria, urinary retention  · Integument  o Denies  o : rash, pigmentation changes  · Neurologic  o Admits  o : dizziness  o Denies  o : tingling or numbness  · Musculoskeletal  o Denies  o : joint pain, joint swelling  · Endocrine  o Denies  o : polyuria, polydipsia, cold intolerance, heat intolerance  · Psychiatric  o Denies  o : anxiety, depression, difficulty sleeping, suicidal ideation, homicidal ideation      Vitals  Date Time BP Position Site L\R Cuff Size HR RR TEMP(F) WT  HT  BMI kg/m2 BSA m2 O2 Sat        02/13/2019 08:46 /80 Sitting    111 - R  96.6 209lbs 0oz 5'  10\" 29.99 2.16 98 %           Physical Examination  · Constitutional  o Appearance  o : well developed, well-nourished, in no acute distress  · Eyes  o Conjunctivae  o : conjunctivae normal, no exudates present  · Neck  o Inspection/Palpation  o : normal appearance, no masses or tenderness, trachea midline  o Thyroid  o : no thyromegaly  · Respiratory  o Respiratory Effort  o : breathing unlabored  o Auscultation of Lungs  o : clear to ascultation  · Cardiovascular  o Heart  o :   § Auscultation of Heart  § : regular rate, normal rhythm, no murmurs present  o Peripheral Vascular System  o :   § Carotid Arteries  § : normal pulses bilaterally, bilateral bruits present   § Pedal Pulses  § : bilateral pulse strength 2+  § Extremities  § : no edema  · Gastrointestinal  o Abdominal Examination  o :   § Abdomen  § : soft, non-tender, non-distended, bowel sounds " +  · Lymphatic  o Neck  o : no lymphadenopathy present  · Musculoskeletal  o General  o :   § General Musculoskeletal  § : Muscle tone, strength, and development grossly normal.  · Skin and Subcutaneous Tissue  o General Inspection  o : no lesions present, no areas of discoloration, skin turgor normal, texture normal  · Neurologic  o Gait and Station  o :   § Gait Screening  § : normal gait  · Psychiatric  o Mood and Affect  o : mood normal, affect appropriate  · Left DM Foot Exam  o Sensation  o : normal sensory exam perceptible to 10-gram nylon monofilament exam (5/5), vibration and light touch.  o Visual Inspection  o : visual inspection is normal with no signs of breakdown, ulcerations or deformities unless otherwise noted.   o Vascular  o : palpable dorsalis pedis and posterir tibialis pulses present, normal capillary refill  · Right DM Foot Exam  o Sensation  o : normal sensory exam perceptible to 10-gram nylon monofilament exam (5/5), vibration and light touch.  o Visual Inspection  o : visual inspection is normal with no signs of breakdown, ulcerations or deformities unless otherwise noted.   o Vascular  o : palpable dorsalis pedis and posterir tibialis pulses present, normal capillary refill          Assessment  · Diabetes mellitus, type 2     250.00/E11.9  · Essential hypertension     401.9/I10  · Hyperlipidemia     272.4/E78.5  · Influenza vaccination up to date     V49.89/Z78.9  · Depression screening negative     V79.0/Z13.31  · PAD (peripheral artery disease)     443.9/I73.9      Plan  · Orders  o Diabetic Foot (Motor and Sensory) Exam Completed [facility] (, , 2028F) - 250.00/E11.9 - 02/13/2019  o CBC with Auto Diff [facility] ([id number]) - 250.00/E11.9, 401.9/I10, 272.4/E78.5 - 02/13/2019  o CMP [facility] (88196) - 250.00/E11.9, 401.9/I10, 272.4/E78.5 - 02/13/2019  o Hgb A1c [facility] (33536) - 250.00/E11.9 - 02/13/2019  o Lipid Panel [facility] (04091) - 250.00/E11.9, 401.9/I10, 272.4/E78.5 - 02/13/2019  o Thyroid Profile  (THYII) - 250.00/E11.9, 401.9/I10, 272.4/E78.5 - 02/13/2019  o Urinalysis with Reflex Microscopy if abnormal ([facility]) (86707) - 250.00/E11.9, 401.9/I10, 272.4/E78.5 - 02/13/2019  o Urine microalbumin (48772) - 250.00/E11.9, 401.9/I10, 272.4/E78.5 - 02/13/2019  o ACO-14: Influenza immunization administered or previously received () - V49.89/Z78.9 - 02/13/2019  o ACO-18: Negative screen for clinical depression using a standardized tool () - V79.0/Z13.31 - 02/13/2019  o ACO-39: Current medications updated and reviewed () - - 02/13/2019  o ACO-41: Dilated Diabetic eye exam completed this year and results in chart/reviewed (2022F) - - 02/15/2019   done at [facility] 08/06/2018  · Medications  o atorvastatin 20 mg oral tablet   SIG: take 1 tablet (20 mg) by oral route once daily for 90 days   DISP: (90) tablet with 1 refills  Refilled on 02/13/2019     o metoprolol tartrate 50 mg oral tablet   SIG: TAKE 1 TAB DAILY   DISP: (90) tablet with 1 refills  Refilled on 02/13/2019     o quinapril 40 mg oral tablet   SIG: take 1 tablet (40 mg) by oral route once daily for 90 days   DISP: (90) tablet with 1 refills  Refilled on 02/13/2019     · Instructions  o Continue blood sugar monitoring daily and record. Bring your log to office visits. Call the office for readings below 70 and above 250 or any complications.  o Daily foot care. Avoid walking barefoot. Annual Dilated Eye Exam.  o Discussed with patient blood pressure monitoring, hemoglobin A1C levels need to be below 7.0, and LDL (Lipid) goals below 70.  o Patient advised to monitor blood pressure (B/P) at home and journal readings. Patient informed that a B/P reading at home of more than 135/85 is considered hypertension. For readings greater boka005/90 or higher patient is advised to follow up in the office with readings for management. Patient advised to limit sodium intake.  o Take all medications as prescribed/directed.  o Patient was educated/instructed on  their diagnosis, treatment and medications prior to discharge from the clinic today.  o Chronic conditions reviewed and taken into consideration for today's treatment plan. He will call me should he want a second opinion/referral regarding the carotid bruits/claudication.  · Disposition  o Call or Return if symptoms worsen or persist.            Electronically Signed by: RUSS Jordan -Author on February 15, 2019 12:13:30 PM

## 2021-05-09 VITALS
WEIGHT: 209 LBS | OXYGEN SATURATION: 98 % | TEMPERATURE: 96.6 F | SYSTOLIC BLOOD PRESSURE: 140 MMHG | HEIGHT: 70 IN | BODY MASS INDEX: 29.92 KG/M2 | DIASTOLIC BLOOD PRESSURE: 80 MMHG | HEART RATE: 111 BPM

## 2021-05-09 VITALS
DIASTOLIC BLOOD PRESSURE: 84 MMHG | OXYGEN SATURATION: 93 % | BODY MASS INDEX: 31.1 KG/M2 | TEMPERATURE: 95.7 F | HEART RATE: 110 BPM | SYSTOLIC BLOOD PRESSURE: 150 MMHG | WEIGHT: 210 LBS | HEIGHT: 69 IN

## 2021-05-09 VITALS
DIASTOLIC BLOOD PRESSURE: 80 MMHG | SYSTOLIC BLOOD PRESSURE: 130 MMHG | BODY MASS INDEX: 31.1 KG/M2 | TEMPERATURE: 97.7 F | HEART RATE: 76 BPM | HEIGHT: 69 IN | OXYGEN SATURATION: 100 % | WEIGHT: 210 LBS

## 2021-05-09 VITALS
HEART RATE: 73 BPM | TEMPERATURE: 97.2 F | SYSTOLIC BLOOD PRESSURE: 142 MMHG | DIASTOLIC BLOOD PRESSURE: 86 MMHG | WEIGHT: 204 LBS | OXYGEN SATURATION: 93 %

## 2021-05-09 VITALS
BODY MASS INDEX: 31.43 KG/M2 | HEART RATE: 79 BPM | DIASTOLIC BLOOD PRESSURE: 60 MMHG | HEIGHT: 69 IN | OXYGEN SATURATION: 96 % | TEMPERATURE: 97.1 F | SYSTOLIC BLOOD PRESSURE: 118 MMHG | WEIGHT: 212.19 LBS

## 2021-05-09 VITALS
WEIGHT: 208 LBS | SYSTOLIC BLOOD PRESSURE: 112 MMHG | DIASTOLIC BLOOD PRESSURE: 54 MMHG | HEART RATE: 80 BPM | TEMPERATURE: 97.3 F | OXYGEN SATURATION: 99 %

## 2021-05-09 VITALS
OXYGEN SATURATION: 97 % | HEART RATE: 106 BPM | SYSTOLIC BLOOD PRESSURE: 140 MMHG | WEIGHT: 207 LBS | TEMPERATURE: 97.6 F | DIASTOLIC BLOOD PRESSURE: 90 MMHG

## 2021-05-10 ENCOUNTER — HOSPITAL ENCOUNTER (OUTPATIENT)
Dept: FAMILY MEDICINE CLINIC | Facility: CLINIC | Age: 81
Discharge: HOME | End: 2021-05-10
Attending: INTERNAL MEDICINE

## 2021-05-10 LAB
25(OH)D3 SERPL-MCNC: 31.2 NG/ML (ref 30–100)
ALBUMIN SERPL-MCNC: 4.1 G/DL (ref 3.5–5)
ALBUMIN/GLOB SERPL: 1.5 {RATIO} (ref 1.4–2.6)
ALP SERPL-CCNC: 76 U/L (ref 56–155)
ALT SERPL-CCNC: 23 U/L (ref 10–40)
ANION GAP SERPL CALC-SCNC: 13 MMOL/L (ref 8–19)
AST SERPL-CCNC: 21 U/L (ref 15–50)
BILIRUB SERPL-MCNC: 0.43 MG/DL (ref 0.2–1.3)
BUN SERPL-MCNC: 16 MG/DL (ref 5–25)
BUN/CREAT SERPL: 11 {RATIO} (ref 6–20)
CALCIUM SERPL-MCNC: 9.5 MG/DL (ref 8.7–10.4)
CHLORIDE SERPL-SCNC: 104 MMOL/L (ref 99–111)
CONV CO2: 30 MMOL/L (ref 22–32)
CONV CREATININE URINE, RANDOM: 288.8 MG/DL (ref 10–300)
CONV MICROALBUM.,U,RANDOM: 25.3 MG/L (ref 0–20)
CONV TOTAL PROTEIN: 6.9 G/DL (ref 6.3–8.2)
CREAT UR-MCNC: 1.49 MG/DL (ref 0.7–1.2)
EST. AVERAGE GLUCOSE BLD GHB EST-MCNC: 126 MG/DL
GFR SERPLBLD BASED ON 1.73 SQ M-ARVRAT: 44 ML/MIN/{1.73_M2}
GLOBULIN UR ELPH-MCNC: 2.8 G/DL (ref 2–3.5)
GLUCOSE SERPL-MCNC: 158 MG/DL (ref 70–99)
HBA1C MFR BLD: 6 % (ref 3.5–5.7)
MICROALBUMIN/CREAT UR: 8.8 MG/G{CRE} (ref 0–25)
OSMOLALITY SERPL CALC.SUM OF ELEC: 300 MOSM/KG (ref 273–304)
POTASSIUM SERPL-SCNC: 4 MMOL/L (ref 3.5–5.3)
SODIUM SERPL-SCNC: 143 MMOL/L (ref 135–147)

## 2021-05-16 VITALS
BODY MASS INDEX: 28.63 KG/M2 | HEART RATE: 66 BPM | SYSTOLIC BLOOD PRESSURE: 166 MMHG | HEIGHT: 70 IN | DIASTOLIC BLOOD PRESSURE: 73 MMHG | TEMPERATURE: 98.1 F | WEIGHT: 200 LBS

## 2021-05-16 VITALS
HEART RATE: 74 BPM | HEIGHT: 72 IN | DIASTOLIC BLOOD PRESSURE: 78 MMHG | BODY MASS INDEX: 27.9 KG/M2 | SYSTOLIC BLOOD PRESSURE: 141 MMHG | WEIGHT: 206 LBS

## 2021-08-16 ENCOUNTER — TRANSCRIBE ORDERS (OUTPATIENT)
Dept: FAMILY MEDICINE CLINIC | Facility: CLINIC | Age: 81
End: 2021-08-16

## 2021-08-16 ENCOUNTER — OFFICE VISIT (OUTPATIENT)
Dept: FAMILY MEDICINE CLINIC | Facility: CLINIC | Age: 81
End: 2021-08-16

## 2021-08-16 VITALS
DIASTOLIC BLOOD PRESSURE: 78 MMHG | TEMPERATURE: 96.6 F | HEART RATE: 107 BPM | HEIGHT: 69 IN | BODY MASS INDEX: 30.81 KG/M2 | WEIGHT: 208 LBS | SYSTOLIC BLOOD PRESSURE: 154 MMHG | OXYGEN SATURATION: 100 %

## 2021-08-16 DIAGNOSIS — N18.31 STAGE 3A CHRONIC KIDNEY DISEASE (HCC): ICD-10-CM

## 2021-08-16 DIAGNOSIS — E55.9 VITAMIN D DEFICIENCY: ICD-10-CM

## 2021-08-16 DIAGNOSIS — I10 ESSENTIAL HYPERTENSION: Primary | ICD-10-CM

## 2021-08-16 DIAGNOSIS — E11.22 TYPE 2 DIABETES MELLITUS WITH STAGE 3A CHRONIC KIDNEY DISEASE, WITHOUT LONG-TERM CURRENT USE OF INSULIN (HCC): ICD-10-CM

## 2021-08-16 DIAGNOSIS — I73.9 PERIPHERAL VASCULAR DISEASE WITH CLAUDICATION (HCC): ICD-10-CM

## 2021-08-16 DIAGNOSIS — N18.31 TYPE 2 DIABETES MELLITUS WITH STAGE 3A CHRONIC KIDNEY DISEASE, WITHOUT LONG-TERM CURRENT USE OF INSULIN (HCC): ICD-10-CM

## 2021-08-16 DIAGNOSIS — E11.9 DIABETES MELLITUS WITHOUT COMPLICATION (HCC): ICD-10-CM

## 2021-08-16 DIAGNOSIS — E78.5 HYPERLIPIDEMIA, UNSPECIFIED HYPERLIPIDEMIA TYPE: ICD-10-CM

## 2021-08-16 PROBLEM — N40.0 BPH (BENIGN PROSTATIC HYPERPLASIA): Status: ACTIVE | Noted: 2021-08-16

## 2021-08-16 PROBLEM — K21.9 GERD (GASTROESOPHAGEAL REFLUX DISEASE): Status: ACTIVE | Noted: 2021-08-16

## 2021-08-16 LAB
25(OH)D3 SERPL-MCNC: 43.3 NG/ML
ALBUMIN UR-MCNC: <1.2 MG/DL
BASOPHILS # BLD AUTO: 0.03 10*3/MM3 (ref 0–0.2)
BASOPHILS NFR BLD AUTO: 0.5 % (ref 0–1.5)
CREAT UR-MCNC: 148.7 MG/DL
DEPRECATED RDW RBC AUTO: 45.1 FL (ref 37–54)
EOSINOPHIL # BLD AUTO: 0.17 10*3/MM3 (ref 0–0.4)
EOSINOPHIL NFR BLD AUTO: 2.8 % (ref 0.3–6.2)
ERYTHROCYTE [DISTWIDTH] IN BLOOD BY AUTOMATED COUNT: 12.5 % (ref 12.3–15.4)
HBA1C MFR BLD: 6.24 % (ref 4.8–5.6)
HCT VFR BLD AUTO: 41.1 % (ref 37.5–51)
HGB BLD-MCNC: 14.1 G/DL (ref 13–17.7)
IMM GRANULOCYTES # BLD AUTO: 0.02 10*3/MM3 (ref 0–0.05)
IMM GRANULOCYTES NFR BLD AUTO: 0.3 % (ref 0–0.5)
LYMPHOCYTES # BLD AUTO: 2.04 10*3/MM3 (ref 0.7–3.1)
LYMPHOCYTES NFR BLD AUTO: 33.7 % (ref 19.6–45.3)
MCH RBC QN AUTO: 34.1 PG (ref 26.6–33)
MCHC RBC AUTO-ENTMCNC: 34.3 G/DL (ref 31.5–35.7)
MCV RBC AUTO: 99.3 FL (ref 79–97)
MICROALBUMIN/CREAT UR: NORMAL MG/G{CREAT}
MONOCYTES # BLD AUTO: 0.52 10*3/MM3 (ref 0.1–0.9)
MONOCYTES NFR BLD AUTO: 8.6 % (ref 5–12)
NEUTROPHILS NFR BLD AUTO: 3.28 10*3/MM3 (ref 1.7–7)
NEUTROPHILS NFR BLD AUTO: 54.1 % (ref 42.7–76)
NRBC BLD AUTO-RTO: 0 /100 WBC (ref 0–0.2)
PLATELET # BLD AUTO: 207 10*3/MM3 (ref 140–450)
PMV BLD AUTO: 10.2 FL (ref 6–12)
RBC # BLD AUTO: 4.14 10*6/MM3 (ref 4.14–5.8)
WBC # BLD AUTO: 6.06 10*3/MM3 (ref 3.4–10.8)

## 2021-08-16 PROCEDURE — 83036 HEMOGLOBIN GLYCOSYLATED A1C: CPT | Performed by: NURSE PRACTITIONER

## 2021-08-16 PROCEDURE — 82570 ASSAY OF URINE CREATININE: CPT | Performed by: NURSE PRACTITIONER

## 2021-08-16 PROCEDURE — 84443 ASSAY THYROID STIM HORMONE: CPT | Performed by: NURSE PRACTITIONER

## 2021-08-16 PROCEDURE — 99214 OFFICE O/P EST MOD 30 MIN: CPT | Performed by: NURSE PRACTITIONER

## 2021-08-16 PROCEDURE — 80061 LIPID PANEL: CPT | Performed by: NURSE PRACTITIONER

## 2021-08-16 PROCEDURE — 80053 COMPREHEN METABOLIC PANEL: CPT | Performed by: NURSE PRACTITIONER

## 2021-08-16 PROCEDURE — 85025 COMPLETE CBC W/AUTO DIFF WBC: CPT | Performed by: NURSE PRACTITIONER

## 2021-08-16 PROCEDURE — 82043 UR ALBUMIN QUANTITATIVE: CPT | Performed by: NURSE PRACTITIONER

## 2021-08-16 PROCEDURE — 82306 VITAMIN D 25 HYDROXY: CPT | Performed by: INTERNAL MEDICINE

## 2021-08-16 PROCEDURE — 84439 ASSAY OF FREE THYROXINE: CPT | Performed by: NURSE PRACTITIONER

## 2021-08-16 RX ORDER — QUINAPRIL 40 MG/1
40 TABLET ORAL DAILY
Qty: 90 TABLET | Refills: 1 | Status: SHIPPED | OUTPATIENT
Start: 2021-08-16 | End: 2022-01-27 | Stop reason: SDUPTHER

## 2021-08-16 RX ORDER — MULTIPLE VITAMINS W/ MINERALS TAB 9MG-400MCG
1 TAB ORAL DAILY
COMMUNITY

## 2021-08-16 RX ORDER — QUINAPRIL 40 MG/1
40 TABLET ORAL DAILY
COMMUNITY
End: 2021-08-16 | Stop reason: SDUPTHER

## 2021-08-16 RX ORDER — ASPIRIN 81 MG/1
81 TABLET ORAL DAILY
COMMUNITY

## 2021-08-16 RX ORDER — ATORVASTATIN CALCIUM 20 MG/1
20 TABLET, FILM COATED ORAL DAILY
COMMUNITY
End: 2021-08-16 | Stop reason: SDUPTHER

## 2021-08-16 RX ORDER — CILOSTAZOL 100 MG/1
100 TABLET ORAL 2 TIMES DAILY
COMMUNITY
End: 2021-08-16 | Stop reason: SDUPTHER

## 2021-08-16 RX ORDER — METOPROLOL TARTRATE 50 MG/1
50 TABLET, FILM COATED ORAL DAILY
COMMUNITY
End: 2021-08-16 | Stop reason: SDUPTHER

## 2021-08-16 RX ORDER — CILOSTAZOL 100 MG/1
100 TABLET ORAL 2 TIMES DAILY
Qty: 180 TABLET | Refills: 1 | Status: SHIPPED | OUTPATIENT
Start: 2021-08-16 | End: 2022-01-27 | Stop reason: SDUPTHER

## 2021-08-16 RX ORDER — ATORVASTATIN CALCIUM 20 MG/1
20 TABLET, FILM COATED ORAL NIGHTLY
Qty: 90 TABLET | Refills: 1 | Status: SHIPPED | OUTPATIENT
Start: 2021-08-16 | End: 2022-01-27 | Stop reason: SDUPTHER

## 2021-08-16 RX ORDER — METOPROLOL TARTRATE 50 MG/1
50 TABLET, FILM COATED ORAL DAILY
Qty: 90 TABLET | Refills: 1 | Status: SHIPPED | OUTPATIENT
Start: 2021-08-16 | End: 2022-01-27 | Stop reason: SDUPTHER

## 2021-08-16 NOTE — PROGRESS NOTES
"Chief Complaint  Hypertension (refills) and Hyperlipidemia (refills)    Subjective            Rudy Moore presents to [facility]  History of Present Illness     follow up on chronic health conditions and medication refills    HTN with HLD status post right carotid endarterectomy on 8/3/2020 - Dr. Parnell with [facility] - as well as bilateral femoral endarterectomies with iliac stenting 10/2020 - He last saw vascular surgery in December of last year - He states he was supposed to see her again in \"6 months\" but they never called him. He does not want me to put in a referral today. He states his legs are doing a whole lot better - he feels at least 75% better - still occasional pain but nothing like it used to be at all. States he needs a refill on cilostazol.      DM - diet controlled - last A1C was in May of this year and it was 6.0%. He checks his blood sugar once weekly and it is usually 120 or less.  His last eye exam was a few years ago. He denies any numbness or tingling in the fingers or toes.     Last PSA in October of 2020 and normal. Denies any dysuria - no urgency, frequency, or nocturia.      Last CRC screening was 2012 - colon polyp removed on c'scope  - he does not want another c'scope. He believes that he has had a cologuard in the recent past.     Depression screening - PHQ-9 score is 0 - denies depression or needing treatment for depression     PHQ-2 Total Score: 0      Past Medical History:   Diagnosis Date   • CAD (coronary artery disease)    • Cataract    • Diabetes mellitus, type 2 (CMS/[facility])    • Essential hypertension 07/23/2020   • GERD (gastroesophageal reflux disease)    • Hyperlipidemia    • Kidney stones    • Peripheral arterial disease (CMS/[facility]) 07/23/2020   • Seasonal allergies        No Known Allergies     Past Surgical History:   Procedure Laterality Date   • CARDIAC SURGERY      BYPASS        Social History     Tobacco Use   • Smoking " "status: Former Smoker     Packs/day: 2.00   • Smokeless tobacco: Never Used   • Tobacco comment: SMOKES 1 TO 2 PACKS PER DAY, CURRENTLY USES OTHER TOBACCO PRODUCTS    Substance Use Topics   • Alcohol use: Yes     Comment: DRINKS ALCHOL, 8-14 DRINKS PER WEEK    • Drug use: Never       Family History   Problem Relation Age of Onset   • Arthritis Mother    • Alcohol abuse Father    • Asthma Sister    • COPD Sister    • Breast cancer Sister    • Diabetes type I Brother    • Stroke Brother         Health Maintenance Due   Topic Date Due   • TDAP/TD VACCINES (1 - Tdap) Never done   • ZOSTER VACCINE (2 of 3) 04/12/2010   • Pneumococcal Vaccine 65+ (1 of 2 - PPSV23) 05/27/2017   • HEPATITIS C SCREENING  Never done   • ANNUAL WELLNESS VISIT  Never done   • DIABETIC EYE EXAM  08/06/2021        Current Outpatient Medications on File Prior to Visit   Medication Sig   • aspirin (aspirin) 81 MG EC tablet Take 81 mg by mouth Daily.   • multivitamin with minerals (VITRUM 50+ Senior Multi) tablet tablet Take 1 tablet by mouth Daily.   • [DISCONTINUED] atorvastatin (LIPITOR) 20 MG tablet Take 20 mg by mouth Daily.   • [DISCONTINUED] cilostazol (PLETAL) 100 MG tablet Take 100 mg by mouth 2 (Two) Times a Day.   • [DISCONTINUED] metoprolol tartrate (LOPRESSOR) 50 MG tablet Take 50 mg by mouth Daily.   • [DISCONTINUED] quinapril (ACCUPRIL) 40 MG tablet Take 40 mg by mouth Daily.     No current facility-administered medications on file prior to visit.       Immunization History   Administered Date(s) Administered   • COVID-19 (MODERNA) 02/09/2021, 02/09/2021, 03/11/2021, 03/11/2021   • Fluzone High Dose =>65 Years ([employer] ONLY) 10/14/2020   • Influenza, Unspecified 08/01/2017   • Pneumococcal Conjugate 13-Valent (PCV13) 04/01/2017   • Zostavax 02/15/2010       Objective     /78   Pulse 107   Temp 96.6 °F (35.9 °C)   Ht 175.3 cm (69\")   Wt 94.3 kg (208 lb)   SpO2 100%   BMI 30.72 kg/m²       Physical Exam  Vitals reviewed. "   Constitutional:       General: He is not in acute distress.     Appearance: Normal appearance. He is well-developed.      Comments: Obese by BMI 30.72 kg/m²   HENT:      Head: Normocephalic and atraumatic.   Eyes:      General: No scleral icterus.     Conjunctiva/sclera: Conjunctivae normal.      Pupils: Pupils are equal, round, and reactive to light.   Neck:      Thyroid: No thyroid mass, thyromegaly or thyroid tenderness.      Vascular: Carotid bruit (Bilateral bruits noted) present.      Trachea: Trachea normal.   Cardiovascular:      Rate and Rhythm: Normal rate and regular rhythm.      Pulses: Normal pulses.      Heart sounds: No murmur heard.     Pulmonary:      Effort: Pulmonary effort is normal.      Breath sounds: Normal breath sounds. No wheezing or rhonchi.   Musculoskeletal:         General: Normal range of motion.      Cervical back: Normal range of motion and neck supple.      Right lower leg: No edema.      Left lower leg: No edema.   Lymphadenopathy:      Cervical: No cervical adenopathy.   Skin:     General: Skin is warm and dry.   Neurological:      Mental Status: He is alert and oriented to person, place, and time.   Psychiatric:         Mood and Affect: Mood and affect normal.         Behavior: Behavior normal.         Thought Content: Thought content normal.         Judgment: Judgment normal.         Result Review :     The following data was reviewed by: RUSS Herrera on 08/16/2021:    Common labs    Common Labsle 3/24/21 3/24/21 4/7/21 5/10/21 5/10/21    1504 1504  1200 1200   Glucose     158 (A)   BUN     16   Creatinine     1.49 (A)   Sodium     143   Potassium     4.0   Chloride     104   Calcium     9.5   Albumin     4.1   Total Bilirubin     0.43   Alkaline Phosphatase     76   AST (SGOT)     21   ALT (SGPT)     23   WBC 7.95  6.50     Hemoglobin 13.3 (A)  13.2 (A)     Hematocrit 39.9 (A)  39.7 (A)     Platelets 232  221     Hemoglobin A1C  6.2 (A)  6.0 (A)    (A) Abnormal  value       Comments are available for some flowsheets but are not being displayed.           A1C Last 3 Results    HGBA1C Last 3 Results 3/24/21 5/10/21   Hemoglobin A1C 6.2 (A) 6.0 (A)   (A) Abnormal value       Comments are available for some flowsheets but are not being displayed.         Data reviewed: Consultant notes : Nephrology, Dr. Cadena     Colonoscopy pathology 2012    Old records accessed and Cologuard was completed 04/12/2021 and negative    Assessment and Plan      Diagnoses and all orders for this visit:    1. Essential hypertension (Primary)  -     metoprolol tartrate (LOPRESSOR) 50 MG tablet; Take 1 tablet by mouth Daily.  Dispense: 90 tablet; Refill: 1  -     quinapril (ACCUPRIL) 40 MG tablet; Take 1 tablet by mouth Daily.  Dispense: 90 tablet; Refill: 1  -     CBC Auto Differential  -     Comprehensive Metabolic Panel  -     TSH+Free T4  -     Microalbumin / Creatinine Urine Ratio - Urine, Clean Catch    2. Hyperlipidemia, unspecified hyperlipidemia type  -     atorvastatin (LIPITOR) 20 MG tablet; Take 1 tablet by mouth Every Night.  Dispense: 90 tablet; Refill: 1  -     Lipid Panel    3. Stage 3a chronic kidney disease (CMS/HCC)    4. Type 2 diabetes mellitus with stage 3a chronic kidney disease, without long-term current use of insulin (CMS/[facility])  -     Hemoglobin A1c    5. Peripheral vascular disease with claudication (CMS/[facility])  -     cilostazol (PLETAL) 100 MG tablet; Take 1 tablet by mouth 2 (Two) Times a Day.  Dispense: 180 tablet; Refill: 1            Follow Up     Return in about 6 months (around 2/16/2022) for Medicare Wellness, Recheck.     I did encourage the patient to contact his vascular surgery office to inquire about follow-up.  He declined a referral at his visit today.    Patient was given instructions and counseling regarding his condition or for health maintenance advice. Please see specific information pulled into the AVS if appropriate.     Rudy Moore  reports that  he has quit smoking. He smoked 2.00 packs per day. He has never used smokeless tobacco.

## 2021-08-17 LAB
ALBUMIN SERPL-MCNC: 4.3 G/DL (ref 3.5–5.2)
ALBUMIN/GLOB SERPL: 1.6 G/DL
ALP SERPL-CCNC: 67 U/L (ref 39–117)
ALT SERPL W P-5'-P-CCNC: 22 U/L (ref 1–41)
ANION GAP SERPL CALCULATED.3IONS-SCNC: 9.2 MMOL/L (ref 5–15)
AST SERPL-CCNC: 22 U/L (ref 1–40)
BILIRUB SERPL-MCNC: 0.7 MG/DL (ref 0–1.2)
BUN SERPL-MCNC: 21 MG/DL (ref 8–23)
BUN/CREAT SERPL: 14.6 (ref 7–25)
CALCIUM SPEC-SCNC: 9.2 MG/DL (ref 8.6–10.5)
CHLORIDE SERPL-SCNC: 102 MMOL/L (ref 98–107)
CHOLEST SERPL-MCNC: 172 MG/DL (ref 0–200)
CO2 SERPL-SCNC: 27.8 MMOL/L (ref 22–29)
CREAT SERPL-MCNC: 1.44 MG/DL (ref 0.76–1.27)
GFR SERPL CREATININE-BSD FRML MDRD: 47 ML/MIN/1.73
GLOBULIN UR ELPH-MCNC: 2.7 GM/DL
GLUCOSE SERPL-MCNC: 127 MG/DL (ref 65–99)
HDLC SERPL-MCNC: 65 MG/DL (ref 40–60)
LDLC SERPL CALC-MCNC: 85 MG/DL (ref 0–100)
LDLC/HDLC SERPL: 1.27 {RATIO}
POTASSIUM SERPL-SCNC: 4.8 MMOL/L (ref 3.5–5.2)
PROT SERPL-MCNC: 7 G/DL (ref 6–8.5)
SODIUM SERPL-SCNC: 139 MMOL/L (ref 136–145)
T4 FREE SERPL-MCNC: 1.14 NG/DL (ref 0.93–1.7)
TRIGL SERPL-MCNC: 123 MG/DL (ref 0–150)
TSH SERPL DL<=0.05 MIU/L-ACNC: 3.77 UIU/ML (ref 0.27–4.2)
VLDLC SERPL-MCNC: 22 MG/DL (ref 5–40)

## 2021-11-16 ENCOUNTER — OFFICE VISIT (OUTPATIENT)
Dept: FAMILY MEDICINE CLINIC | Facility: CLINIC | Age: 81
End: 2021-11-16

## 2021-11-16 VITALS
BODY MASS INDEX: 30.72 KG/M2 | TEMPERATURE: 96.9 F | WEIGHT: 208 LBS | SYSTOLIC BLOOD PRESSURE: 120 MMHG | HEART RATE: 86 BPM | DIASTOLIC BLOOD PRESSURE: 80 MMHG | OXYGEN SATURATION: 96 %

## 2021-11-16 DIAGNOSIS — M79.18 MUSCULOSKELETAL PAIN: Primary | ICD-10-CM

## 2021-11-16 LAB
CHROMATIN AB SERPL-ACNC: <10 IU/ML (ref 0–14)
CK SERPL-CCNC: 64 U/L (ref 20–200)
CRP SERPL-MCNC: 3.71 MG/DL (ref 0–0.5)
URATE SERPL-MCNC: 5 MG/DL (ref 3.4–7)

## 2021-11-16 PROCEDURE — 82550 ASSAY OF CK (CPK): CPT | Performed by: NURSE PRACTITIONER

## 2021-11-16 PROCEDURE — 86431 RHEUMATOID FACTOR QUANT: CPT | Performed by: NURSE PRACTITIONER

## 2021-11-16 PROCEDURE — 86063 ANTISTREPTOLYSIN O SCREEN: CPT | Performed by: NURSE PRACTITIONER

## 2021-11-16 PROCEDURE — 86038 ANTINUCLEAR ANTIBODIES: CPT | Performed by: NURSE PRACTITIONER

## 2021-11-16 PROCEDURE — 86140 C-REACTIVE PROTEIN: CPT | Performed by: NURSE PRACTITIONER

## 2021-11-16 PROCEDURE — 36415 COLL VENOUS BLD VENIPUNCTURE: CPT | Performed by: NURSE PRACTITIONER

## 2021-11-16 PROCEDURE — 84550 ASSAY OF BLOOD/URIC ACID: CPT | Performed by: NURSE PRACTITIONER

## 2021-11-16 PROCEDURE — 99214 OFFICE O/P EST MOD 30 MIN: CPT | Performed by: NURSE PRACTITIONER

## 2021-11-16 PROCEDURE — 82553 CREATINE MB FRACTION: CPT | Performed by: NURSE PRACTITIONER

## 2021-11-16 PROCEDURE — 86225 DNA ANTIBODY NATIVE: CPT | Performed by: NURSE PRACTITIONER

## 2021-11-16 RX ORDER — METHOCARBAMOL 500 MG/1
500 TABLET, FILM COATED ORAL 4 TIMES DAILY PRN
Qty: 40 TABLET | Refills: 0 | Status: SHIPPED | OUTPATIENT
Start: 2021-11-16 | End: 2022-01-27 | Stop reason: SDUPTHER

## 2021-11-16 NOTE — PROGRESS NOTES
Venipuncture Blood Specimen Collection  Venipuncture performed in right arm  by Lillie Tinsley with good hemostasis. Patient tolerated the procedure well without complications.   11/16/21   Lillie Tinsley

## 2021-11-16 NOTE — PROGRESS NOTES
Chief Complaint  joint pain    Subjective            Rudy Moore presents to [facility]  History of Present Illness     He c/o generalized muscle aches and pain - states 2-3 weeks ago he fell. He tripped over a wire and fell backwards - had a trap set to catch a groundhog in his barn and he caught a rat. He was trying to reset his trap after getting the rat out and that is when he tripped. When he fell, he fell straight backwards onto his Bush Hog. He didn't have any bruising; nothing broken that he knows of.     Since the fall, he also got his COVID booster and his flu shot. He feels like the joint pain has gotten worse, especially in the past week or so, following in the shots. He got one shot in each arm. His shoulders hurt him the most. Last week he could barely get his coat on. When he raises his arms he can raise them all the way, but they hurt. It seems like it has gotten better than what it was. His wife made the appointment; he didn't want it.     He is currently taking Aleve OTC - He has not really tried Tylenol. The pharmacist told him to try Aleve.       Past Medical History:   Diagnosis Date   • CAD (coronary artery disease)    • Cataract    • Diabetes mellitus, type 2 (HCC)    • Essential hypertension 07/23/2020   • GERD (gastroesophageal reflux disease)    • Hyperlipidemia    • Kidney stones    • Peripheral arterial disease (HCC) 07/23/2020   • Seasonal allergies        No Known Allergies     Past Surgical History:   Procedure Laterality Date   • CARDIAC SURGERY      BYPASS        Social History     Tobacco Use   • Smoking status: Former Smoker     Packs/day: 2.00   • Smokeless tobacco: Never Used   • Tobacco comment: SMOKES 1 TO 2 PACKS PER DAY, CURRENTLY USES OTHER TOBACCO PRODUCTS    Substance Use Topics   • Alcohol use: Yes     Comment: DRINKS ALCHOL, 8-14 DRINKS PER WEEK    • Drug use: Never       Family History   Problem Relation Age of Onset   •  Arthritis Mother    • Alcohol abuse Father    • Asthma Sister    • COPD Sister    • Breast cancer Sister    • Diabetes type I Brother    • Stroke Brother         Health Maintenance Due   Topic Date Due   • TDAP/TD VACCINES (1 - Tdap) Never done   • ZOSTER VACCINE (2 of 3) 04/12/2010   • Pneumococcal Vaccine 65+ (1 of 2 - PPSV23) 05/27/2017   • INFLUENZA VACCINE  08/01/2021   • HEPATITIS C SCREENING  Never done   • ANNUAL WELLNESS VISIT  Never done   • DIABETIC EYE EXAM  08/06/2021        Current Outpatient Medications on File Prior to Visit   Medication Sig   • aspirin (aspirin) 81 MG EC tablet Take 81 mg by mouth Daily.   • atorvastatin (LIPITOR) 20 MG tablet Take 1 tablet by mouth Every Night.   • cilostazol (PLETAL) 100 MG tablet Take 1 tablet by mouth 2 (Two) Times a Day.   • metoprolol tartrate (LOPRESSOR) 50 MG tablet Take 1 tablet by mouth Daily.   • multivitamin with minerals (VITRUM 50+ Senior Multi) tablet tablet Take 1 tablet by mouth Daily.   • quinapril (ACCUPRIL) 40 MG tablet Take 1 tablet by mouth Daily.     No current facility-administered medications on file prior to visit.       Immunization History   Administered Date(s) Administered   • COVID-19 (MODERNA) 1st, 2nd, 3rd Dose Only 02/09/2021, 02/09/2021, 03/11/2021, 03/11/2021   • Fluzone High Dose =>65 Years ([employer] ONLY) 10/14/2020   • Influenza, Unspecified 08/01/2017   • Pneumococcal Conjugate 13-Valent (PCV13) 04/01/2017   • Zostavax 02/15/2010       Review of Systems     Objective     /80   Pulse 86   Temp 96.9 °F (36.1 °C)   Wt 94.3 kg (208 lb)   SpO2 96%   BMI 30.72 kg/m²       Physical Exam  Vitals reviewed.   Constitutional:       General: He is not in acute distress.     Appearance: Normal appearance. He is well-developed.   HENT:      Head: Normocephalic and atraumatic.   Eyes:      General: No scleral icterus.     Conjunctiva/sclera: Conjunctivae normal.   Cardiovascular:      Rate and Rhythm: Normal rate and regular  rhythm.      Pulses: Normal pulses.      Heart sounds: Murmur (grade III/VI systolic murmur) heard.       Pulmonary:      Effort: Pulmonary effort is normal.      Breath sounds: Normal breath sounds. No wheezing or rhonchi.   Musculoskeletal:         General: Normal range of motion.      Right lower leg: No edema.      Left lower leg: No edema.      Comments: No significant musculoskeletal abnormality on exam today.  The midline spine is nontender to palpation.  Mild paraspinal tenderness bilateral lumbar region.  Range of motion of bilateral upper extremities is within normal limits.  No audible crepitus.  No palpable crepitus.  His shoulders are nontender to palpation.   Skin:     General: Skin is warm and dry.   Neurological:      Mental Status: He is alert and oriented to person, place, and time.   Psychiatric:         Mood and Affect: Mood and affect normal.         Behavior: Behavior normal.         Thought Content: Thought content normal.         Judgment: Judgment normal.         Result Review :     The following data was reviewed by: RUSS Herrera on 11/16/2021:    CMP    CMP 3/23/21 5/10/21 8/16/21   Glucose 118 (A) 158 (A) 127 (A)   BUN 22 16 21   Creatinine 1.52 (A) 1.49 (A) 1.44 (A)   eGFR Non African Am   47 (A)   Sodium 143 143 139   Potassium 5.0 4.0 4.8   Chloride 103 104 102   Calcium 9.6 9.5 9.2   Albumin 4.0 4.1 4.30   Total Bilirubin 0.96 0.43 0.7   Alkaline Phosphatase 75 76 67   AST (SGOT) 24 21 22   ALT (SGPT) 23 23 22   (A) Abnormal value                          Assessment and Plan      Diagnoses and all orders for this visit:    1. Musculoskeletal pain (Primary)  -     CK  -     CK MB  -     Uric acid  -     Antistreptolysin O screen  -     Rheumatoid factor  -     C-reactive protein  -     OK  -     methocarbamol (Robaxin) 500 MG tablet; Take 1 tablet by mouth 4 (Four) Times a Day As Needed for Muscle Spasms.  Dispense: 40 tablet; Refill: 0            Follow Up     Return if  symptoms worsen or fail to improve.     We will get labs today to rule out any underlying pathology that would be contributing to his symptoms.  I suspect that his recent fall, followed by immunization, is the underlying cause.  I did offer to x-ray his shoulders and spine due to his recent fall and his complaints; however, he declined.  He states that his symptoms are improving from last week, and his wife and his children are the ones who wanted him to come today.  If his lab work is within normal limits, then I have encouraged conservative treatment with Tylenol and muscle relaxers.  Advised against the use of Aleve or any other NSAIDs secondary to his abnormal renal function.  Should his pain persist or worsen, then I did recommend that he follow-up for imaging.  Voiced understanding.    Patient was given instructions and counseling regarding his condition or for health maintenance advice. Please see specific information pulled into the AVS if appropriate.     Rudy Moore  reports that he has quit smoking. He smoked 2.00 packs per day. He has never used smokeless tobacco.

## 2021-11-17 LAB
ASO AB SERPL-ACNC: NEGATIVE [IU]/ML
CK MB SERPL-CCNC: 1.42 NG/ML
DSDNA IGG SERPL IA-ACNC: NEGATIVE [IU]/ML
NUCLEAR IGG SER IA-RTO: NEGATIVE

## 2021-11-19 ENCOUNTER — HOSPITAL ENCOUNTER (OUTPATIENT)
Dept: GENERAL RADIOLOGY | Facility: HOSPITAL | Age: 81
Discharge: HOME | End: 2021-11-19

## 2021-11-19 ENCOUNTER — TELEPHONE (OUTPATIENT)
Dept: FAMILY MEDICINE CLINIC | Facility: CLINIC | Age: 81
End: 2021-11-19
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
## 2021-11-19 DIAGNOSIS — M54.50 LUMBAR SPINE PAIN: ICD-10-CM
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
## 2021-11-19 DIAGNOSIS — M25.512 ACUTE PAIN OF BOTH SHOULDERS: ICD-10-CM

## 2021-11-19 DIAGNOSIS — M54.6 THORACIC SPINE PAIN: ICD-10-CM

## 2021-11-19 DIAGNOSIS — M25.511 ACUTE PAIN OF BOTH SHOULDERS: ICD-10-CM

## 2021-11-19 PROCEDURE — 72114 X-RAY EXAM L-S SPINE BENDING: CPT

## 2021-11-19 PROCEDURE — 73030 X-RAY EXAM OF SHOULDER: CPT

## 2021-11-19 PROCEDURE — 72072 X-RAY EXAM THORAC SPINE 3VWS: CPT

## 2021-11-19 NOTE — TELEPHONE ENCOUNTER
Pt's wife called and said that he is not better.  He might even be worse she said.  They were wanting an order for an xray. [phone]

## 2021-11-22 ENCOUNTER — TELEPHONE (OUTPATIENT)
Dept: FAMILY MEDICINE CLINIC | Facility: CLINIC | Age: 81
End: 2021-11-22

## 2021-11-22 DIAGNOSIS — M54.6 THORACIC SPINE PAIN: ICD-10-CM

## 2021-11-22 DIAGNOSIS — M79.18 MUSCULOSKELETAL PAIN: ICD-10-CM

## 2021-11-22 DIAGNOSIS — M54.50 LUMBAR SPINE PAIN: ICD-10-CM

## 2021-11-22 DIAGNOSIS — M25.511 ACUTE PAIN OF BOTH SHOULDERS: ICD-10-CM

## 2021-11-22 DIAGNOSIS — M25.512 ACUTE PAIN OF BOTH SHOULDERS: ICD-10-CM
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
## 2021-11-22 RX ORDER — METHYLPREDNISOLONE 4 MG/1
TABLET ORAL
Qty: 1 EACH | Refills: 0 | Status: SHIPPED | OUTPATIENT
Start: 2021-11-22 | End: 2021-12-07

## 2021-11-22 NOTE — TELEPHONE ENCOUNTER
Caller: JOSE CRUZ SMITH    Relationship: Emergency Contact    Best call back number:[phone]    What is the best time to reach you: ANY     Who are you requesting to speak with (clinical staff, provider,  specific staff member): CLINICAL     What was the call regarding: PATIENTS WIFE STATES THAT PATIENT IS NOT DOING ANY BETTER SINCE X RAY ON THE 19TH  AND WOULD LIKE TO SPEAK WITH NURSE     Do you require a callback: YES

## 2021-11-22 NOTE — TELEPHONE ENCOUNTER
Angelika called stating that someone told her that a steroid would be sent over on Saturday. I explained to her that you didn't work Saturday and that I would check with you.

## 2021-12-07 ENCOUNTER — OFFICE VISIT (OUTPATIENT)
Dept: FAMILY MEDICINE CLINIC | Facility: CLINIC | Age: 81
End: 2021-12-07

## 2021-12-07 VITALS
OXYGEN SATURATION: 95 % | WEIGHT: 205 LBS | BODY MASS INDEX: 30.36 KG/M2 | DIASTOLIC BLOOD PRESSURE: 64 MMHG | HEIGHT: 69 IN | TEMPERATURE: 97.1 F | SYSTOLIC BLOOD PRESSURE: 134 MMHG | HEART RATE: 83 BPM

## 2021-12-07 DIAGNOSIS — N18.31 STAGE 3A CHRONIC KIDNEY DISEASE (HCC): ICD-10-CM

## 2021-12-07 DIAGNOSIS — Z79.899 HIGH RISK MEDICATION USE: ICD-10-CM

## 2021-12-07 DIAGNOSIS — M48.10 DISH (DIFFUSE IDIOPATHIC SKELETAL HYPEROSTOSIS): Primary | ICD-10-CM

## 2021-12-07 DIAGNOSIS — M79.18 MUSCULOSKELETAL PAIN: ICD-10-CM

## 2021-12-07 PROCEDURE — 80307 DRUG TEST PRSMV CHEM ANLYZR: CPT | Performed by: NURSE PRACTITIONER

## 2021-12-07 PROCEDURE — 99214 OFFICE O/P EST MOD 30 MIN: CPT | Performed by: NURSE PRACTITIONER

## 2021-12-07 RX ORDER — TRAMADOL HYDROCHLORIDE 50 MG/1
50 TABLET ORAL 2 TIMES DAILY PRN
Qty: 60 TABLET | Refills: 0 | Status: SHIPPED | OUTPATIENT
Start: 2021-12-07 | End: 2021-12-30 | Stop reason: SDUPTHER

## 2021-12-07 NOTE — PROGRESS NOTES
Chief Complaint  Arthritis (f/u from arthritis)    Subjective            Rudy Moore presents to [facility]  History of Present Illness     Follow up from previous visit - was seen on 11/16 with c/o back and shoulder pain. Had x-rays a few days later d/t no improvement - labs with no significant findings and robaxin was not efficacious. He was then given a steroid pack - states he did really well on that, but a day or two off of that and he went back to feeling like he was. He states that he can hardly bend down or put his coat on without hurting. Lower body doesn't really hurt. He hurts mainly in his back, shoulders, and wrists.     Lumbar spine x-ray showed moderate multilevel degenerative disc disease and lower lumbar spine facet arthrosis, but no acute findings.    Thoracic spine showed findings consistent with DISH, but no acute findings.     Bilateral shoulders show mild glenohumeral and AC joint arthrosis on the left, moderate glenohumeral and AC joint arthrosis on the right, but no acute findings.    His wife is prescribed Tramadol - he took one of hers one day to see if it would help when he was in more severe pain and it didn't make a difference at all. He states when he gets out of bed in the AM it is really bad. It does improve some throughout the day with movement, but even at the end of the day he still hurts. He cannot take NSAIDs due to stage III CKD. He has been taking Tylenol arthritis fairly routinely and that does not really provide relief either.       Past Medical History:   Diagnosis Date   • CAD (coronary artery disease)    • Cataract    • Diabetes mellitus, type 2 (HCC)    • Essential hypertension 07/23/2020   • GERD (gastroesophageal reflux disease)    • Hyperlipidemia    • Kidney stones    • Peripheral arterial disease (HCC) 07/23/2020   • Seasonal allergies        No Known Allergies     Past Surgical History:   Procedure Laterality Date   •  CARDIAC SURGERY      BYPASS        Social History     Tobacco Use   • Smoking status: Former Smoker     Packs/day: 2.00   • Smokeless tobacco: Never Used   • Tobacco comment: SMOKES 1 TO 2 PACKS PER DAY, CURRENTLY USES OTHER TOBACCO PRODUCTS    Substance Use Topics   • Alcohol use: Yes     Comment: DRINKS ALCHOL, 8-14 DRINKS PER WEEK    • Drug use: Never       Family History   Problem Relation Age of Onset   • Arthritis Mother    • Alcohol abuse Father    • Asthma Sister    • COPD Sister    • Breast cancer Sister    • Diabetes type I Brother    • Stroke Brother         Health Maintenance Due   Topic Date Due   • TDAP/TD VACCINES (1 - Tdap) Never done   • ZOSTER VACCINE (2 of 3) 04/12/2010   • Pneumococcal Vaccine 65+ (1 of 2 - PPSV23) 05/27/2017   • ANNUAL WELLNESS VISIT  Never done   • DIABETIC EYE EXAM  08/06/2021   • COVID-19 Vaccine (3 - Booster for Moderna series) 09/11/2021        Current Outpatient Medications on File Prior to Visit   Medication Sig   • aspirin (aspirin) 81 MG EC tablet Take 81 mg by mouth Daily.   • atorvastatin (LIPITOR) 20 MG tablet Take 1 tablet by mouth Every Night.   • cilostazol (PLETAL) 100 MG tablet Take 1 tablet by mouth 2 (Two) Times a Day.   • methocarbamol (Robaxin) 500 MG tablet Take 1 tablet by mouth 4 (Four) Times a Day As Needed for Muscle Spasms.   • metoprolol tartrate (LOPRESSOR) 50 MG tablet Take 1 tablet by mouth Daily.   • multivitamin with minerals (VITRUM 50+ Senior Multi) tablet tablet Take 1 tablet by mouth Daily.   • quinapril (ACCUPRIL) 40 MG tablet Take 1 tablet by mouth Daily.   • [DISCONTINUED] methylPREDNISolone (MEDROL) 4 MG dose pack Take as directed on package instructions.     No current facility-administered medications on file prior to visit.       Immunization History   Administered Date(s) Administered   • COVID-19 (MODERNA) 1st, 2nd, 3rd Dose Only 02/09/2021, 02/09/2021, 03/11/2021, 03/11/2021   • Fluzone High Dose =>65 Years ([employer] ONLY) 10/14/2020  "  • Influenza, Unspecified 08/01/2017   • Pneumococcal Conjugate 13-Valent (PCV13) 04/01/2017   • Zostavax 02/15/2010       Review of Systems     Objective     /64 (BP Location: Left arm, Patient Position: Sitting, Cuff Size: Adult)   Pulse 83   Temp 97.1 °F (36.2 °C) (Temporal)   Ht 175.3 cm (69\")   Wt 93 kg (205 lb)   SpO2 95%   BMI 30.27 kg/m²       Physical Exam  Vitals reviewed.   Constitutional:       General: He is not in acute distress.     Appearance: Normal appearance. He is well-developed. He is obese.   HENT:      Head: Normocephalic and atraumatic.   Eyes:      General: No scleral icterus.     Conjunctiva/sclera: Conjunctivae normal.   Cardiovascular:      Rate and Rhythm: Normal rate and regular rhythm.      Pulses: Normal pulses.      Heart sounds: Murmur (grade III/VI systolic) heard.       Pulmonary:      Effort: Pulmonary effort is normal.      Breath sounds: Normal breath sounds. No wheezing, rhonchi or rales.   Musculoskeletal:         General: Normal range of motion.      Right lower leg: No edema.      Left lower leg: No edema.   Skin:     General: Skin is warm and dry.   Neurological:      Mental Status: He is alert and oriented to person, place, and time.   Psychiatric:         Mood and Affect: Mood and affect normal.         Behavior: Behavior normal.         Thought Content: Thought content normal.         Judgment: Judgment normal.         Result Review :     The following data was reviewed by: RUSS Herrera on 12/07/2021:    CMP    CMP 3/23/21 5/10/21 8/16/21   Glucose 118 (A) 158 (A) 127 (A)   BUN 22 16 21   Creatinine 1.52 (A) 1.49 (A) 1.44 (A)   eGFR Non African Am   47 (A)   Sodium 143 143 139   Potassium 5.0 4.0 4.8   Chloride 103 104 102   Calcium 9.6 9.5 9.2   Albumin 4.0 4.1 4.30   Total Bilirubin 0.96 0.43 0.7   Alkaline Phosphatase 75 76 67   AST (SGOT) 24 21 22   ALT (SGPT) 23 23 22   (A) Abnormal value              Data reviewed: Radiologic studies : "   XR Shoulder 2+ View Bilateral (11/19/2021 13:55)  XR Spine Thoracic 3 View (11/19/2021 13:55)  XR Spine Lumbar Complete With Flex & Ext (11/19/2021 13:56)         Assessment and Plan      Diagnoses and all orders for this visit:    1. DISH (diffuse idiopathic skeletal hyperostosis) (Primary)  -     Ambulatory Referral to Rheumatology  -     traMADol (ULTRAM) 50 MG tablet; Take 1 tablet by mouth 2 (Two) Times a Day As Needed for Moderate Pain .  Dispense: 60 tablet; Refill: 0    2. Musculoskeletal pain  -     Ambulatory Referral to Rheumatology  -     traMADol (ULTRAM) 50 MG tablet; Take 1 tablet by mouth 2 (Two) Times a Day As Needed for Moderate Pain .  Dispense: 60 tablet; Refill: 0    3. Stage 3a chronic kidney disease (HCC)  -     traMADol (ULTRAM) 50 MG tablet; Take 1 tablet by mouth 2 (Two) Times a Day As Needed for Moderate Pain .  Dispense: 60 tablet; Refill: 0    4. High risk medication use  -     Urine Drug Screen - Urine, Clean Catch  -     traMADol (ULTRAM) 50 MG tablet; Take 1 tablet by mouth 2 (Two) Times a Day As Needed for Moderate Pain .  Dispense: 60 tablet; Refill: 0            Follow Up     Return in about 1 month (around 1/7/2022) for Recheck.     We have discussed options for management of his pain.  He cannot take anti-inflammatories secondary to stage III chronic kidney disease.  Tylenol has not been efficacious.  Long-term use of steroids is not advised.  I am going to initiate him on tramadol 50 mg twice daily as needed for moderate pain.  He is going to follow-up with me in approximately 3 to 4 weeks to reassess.    I am also going to refer him to rheumatology to see if there are some other options for treatment.  If rheumatology does not have any other options, and tramadol is not efficacious, then we will refer to pain management.    Patient was given instructions and counseling regarding his condition or for health maintenance advice. Please see specific information pulled into the  AVS if appropriate.     Rudy ROSEANN Moore  reports that he has quit smoking. He smoked 2.00 packs per day. He has never used smokeless tobacco.

## 2021-12-08 ENCOUNTER — TELEPHONE (OUTPATIENT)
Dept: FAMILY MEDICINE CLINIC | Facility: CLINIC | Age: 81
End: 2021-12-08

## 2021-12-08 LAB
AMPHET+METHAMPHET UR QL: NEGATIVE
BARBITURATES UR QL SCN: NEGATIVE
BENZODIAZ UR QL SCN: NEGATIVE
CANNABINOIDS SERPL QL: NEGATIVE
COCAINE UR QL: NEGATIVE
METHADONE UR QL SCN: NEGATIVE
OPIATES UR QL: NEGATIVE
OXYCODONE UR QL SCN: NEGATIVE

## 2021-12-08 NOTE — TELEPHONE ENCOUNTER
Caller: JOSE CRUZ SMITH    Relationship: Emergency Contact    Best call back number: [phone]    What is the best time to reach you: ANY    Who are you requesting to speak with (clinical staff, provider,  specific staff member) SHELLEY GOMEZ    What was the call regarding: PATIENT'S WIFE STATED THAT THE PATIENT WAS SEEN IN THE OFFICE ON 12/7/21, AND THAT HE WAS PRESCRIBED TRAMADOL AND A STEROID PACK. SHE STATES THAT THE TRAMADOL DOES NOT SEEM TO BE HELPING, AND SHE WOULD LIKE TO SPEAK TO DR GOMEZ OR HER NURSE ABOUT POTENTIALLY CHANGING THIS MEDICATION.    Do you require a callback: YES

## 2021-12-30 ENCOUNTER — TRANSCRIBE ORDERS (OUTPATIENT)
Dept: FAMILY MEDICINE CLINIC | Facility: CLINIC | Age: 81
End: 2021-12-30

## 2021-12-30 ENCOUNTER — OFFICE VISIT (OUTPATIENT)
Dept: FAMILY MEDICINE CLINIC | Facility: CLINIC | Age: 81
End: 2021-12-30

## 2021-12-30 VITALS
SYSTOLIC BLOOD PRESSURE: 140 MMHG | BODY MASS INDEX: 29.83 KG/M2 | WEIGHT: 202 LBS | DIASTOLIC BLOOD PRESSURE: 80 MMHG | OXYGEN SATURATION: 95 % | TEMPERATURE: 96.5 F | HEART RATE: 78 BPM

## 2021-12-30 DIAGNOSIS — E11.9 TYPE 2 DIABETES MELLITUS WITHOUT COMPLICATION, UNSPECIFIED WHETHER LONG TERM INSULIN USE (HCC): ICD-10-CM

## 2021-12-30 DIAGNOSIS — I10 ESSENTIAL HYPERTENSION, BENIGN: ICD-10-CM

## 2021-12-30 DIAGNOSIS — N18.31 STAGE 3A CHRONIC KIDNEY DISEASE (HCC): ICD-10-CM

## 2021-12-30 DIAGNOSIS — M79.18 MUSCULOSKELETAL PAIN: ICD-10-CM

## 2021-12-30 DIAGNOSIS — N18.30 STAGE 3 CHRONIC KIDNEY DISEASE, UNSPECIFIED WHETHER STAGE 3A OR 3B CKD (HCC): Primary | ICD-10-CM

## 2021-12-30 DIAGNOSIS — Z79.899 HIGH RISK MEDICATION USE: ICD-10-CM

## 2021-12-30 DIAGNOSIS — M48.10 DISH (DIFFUSE IDIOPATHIC SKELETAL HYPEROSTOSIS): ICD-10-CM

## 2021-12-30 LAB
ALBUMIN SERPL-MCNC: 4 G/DL (ref 3.5–5.2)
ALBUMIN UR-MCNC: 2.7 MG/DL
ALBUMIN/GLOB SERPL: 1.3 G/DL
ALP SERPL-CCNC: 106 U/L (ref 39–117)
ALT SERPL W P-5'-P-CCNC: 21 U/L (ref 1–41)
ANION GAP SERPL CALCULATED.3IONS-SCNC: 8.4 MMOL/L (ref 5–15)
AST SERPL-CCNC: 18 U/L (ref 1–40)
BILIRUB SERPL-MCNC: 0.5 MG/DL (ref 0–1.2)
BUN SERPL-MCNC: 16 MG/DL (ref 8–23)
BUN/CREAT SERPL: 12.6 (ref 7–25)
CALCIUM SPEC-SCNC: 9.8 MG/DL (ref 8.6–10.5)
CHLORIDE SERPL-SCNC: 100 MMOL/L (ref 98–107)
CO2 SERPL-SCNC: 31.6 MMOL/L (ref 22–29)
CREAT SERPL-MCNC: 1.27 MG/DL (ref 0.76–1.27)
GFR SERPL CREATININE-BSD FRML MDRD: 55 ML/MIN/1.73
GLOBULIN UR ELPH-MCNC: 3.2 GM/DL
GLUCOSE SERPL-MCNC: 123 MG/DL (ref 65–99)
HBA1C MFR BLD: 6.57 % (ref 4.8–5.6)
POTASSIUM SERPL-SCNC: 4.8 MMOL/L (ref 3.5–5.2)
PROT SERPL-MCNC: 7.2 G/DL (ref 6–8.5)
SODIUM SERPL-SCNC: 140 MMOL/L (ref 136–145)

## 2021-12-30 PROCEDURE — 80053 COMPREHEN METABOLIC PANEL: CPT | Performed by: INTERNAL MEDICINE

## 2021-12-30 PROCEDURE — 82043 UR ALBUMIN QUANTITATIVE: CPT | Performed by: INTERNAL MEDICINE

## 2021-12-30 PROCEDURE — 83036 HEMOGLOBIN GLYCOSYLATED A1C: CPT | Performed by: INTERNAL MEDICINE

## 2021-12-30 PROCEDURE — 99214 OFFICE O/P EST MOD 30 MIN: CPT | Performed by: NURSE PRACTITIONER

## 2021-12-30 RX ORDER — TRAMADOL HYDROCHLORIDE 50 MG/1
100 TABLET ORAL 2 TIMES DAILY PRN
Qty: 120 TABLET | Refills: 0 | Status: SHIPPED | OUTPATIENT
Start: 2021-12-30 | End: 2022-01-27 | Stop reason: SDUPTHER

## 2021-12-30 NOTE — PROGRESS NOTES
Chief Complaint  Pain (wants to increase pain meds )    Subjective            Rudy Moore presents to [facility]  History of Present Illness     Patient presents to the office today to follow-up on recent initiation of tramadol.  He is prescribed tramadol 50 mg twice daily for pain management related to recent acute on chronic shoulder pain, back pain, and bilateral wrist pain.  His symptoms were not improving with muscle relaxer and Tylenol, and unfortunately he has stage III chronic kidney disease and cannot take NSAIDs.    Despite taking tramadol 50 mg twice daily he continues to describe chronic pain of the shoulders, back, and wrists.  He reports his wrist swell intermittently.  Even when taking the tramadol 50 mg twice daily he can have pain rated as a 10 out of 10.  He will sometimes have difficulty putting on his shirt or jacket.  He is requesting an increase in dose of tramadol.  His wife takes 100 mg twice daily, and he has tried this dose himself, and reports that he does get better pain relief with 100 mg.    He is awaiting a rheumatology referral for evaluation of findings of DISH on his x-rays.    He denies any adverse side effects with use of tramadol.  Denies daytime somnolence or dizziness.  He did have constipation initially, but that has seemingly worked its way out.    Past Medical History:   Diagnosis Date   • CAD (coronary artery disease)    • Cataract    • Diabetes mellitus, type 2 (HCC)    • Essential hypertension 07/23/2020   • GERD (gastroesophageal reflux disease)    • Hyperlipidemia    • Kidney stones    • Peripheral arterial disease (HCC) 07/23/2020   • Seasonal allergies        No Known Allergies     Past Surgical History:   Procedure Laterality Date   • CARDIAC SURGERY      BYPASS        Social History     Tobacco Use   • Smoking status: Former Smoker     Packs/day: 2.00   • Smokeless tobacco: Never Used   • Tobacco comment: SMOKES 1 TO 2  PACKS PER DAY, CURRENTLY USES OTHER TOBACCO PRODUCTS    Substance Use Topics   • Alcohol use: Yes     Comment: DRINKS ALCHOL, 8-14 DRINKS PER WEEK    • Drug use: Never       Family History   Problem Relation Age of Onset   • Arthritis Mother    • Alcohol abuse Father    • Asthma Sister    • COPD Sister    • Breast cancer Sister    • Diabetes type I Brother    • Stroke Brother         Health Maintenance Due   Topic Date Due   • TDAP/TD VACCINES (1 - Tdap) Never done   • ZOSTER VACCINE (2 of 3) 04/12/2010   • Pneumococcal Vaccine 65+ (1 of 2 - PPSV23) 05/27/2017   • ANNUAL WELLNESS VISIT  Never done   • DIABETIC EYE EXAM  08/06/2021   • COVID-19 Vaccine (3 - Booster for Moderna series) 09/11/2021        Current Outpatient Medications on File Prior to Visit   Medication Sig   • aspirin (aspirin) 81 MG EC tablet Take 81 mg by mouth Daily.   • atorvastatin (LIPITOR) 20 MG tablet Take 1 tablet by mouth Every Night.   • cilostazol (PLETAL) 100 MG tablet Take 1 tablet by mouth 2 (Two) Times a Day.   • methocarbamol (Robaxin) 500 MG tablet Take 1 tablet by mouth 4 (Four) Times a Day As Needed for Muscle Spasms.   • metoprolol tartrate (LOPRESSOR) 50 MG tablet Take 1 tablet by mouth Daily.   • multivitamin with minerals (VITRUM 50+ Senior Multi) tablet tablet Take 1 tablet by mouth Daily.   • quinapril (ACCUPRIL) 40 MG tablet Take 1 tablet by mouth Daily.   • [DISCONTINUED] traMADol (ULTRAM) 50 MG tablet Take 1 tablet by mouth 2 (Two) Times a Day As Needed for Moderate Pain .     No current facility-administered medications on file prior to visit.       Immunization History   Administered Date(s) Administered   • COVID-19 (MODERNA) 1st, 2nd, 3rd Dose Only 02/09/2021, 02/09/2021, 03/11/2021, 03/11/2021   • Fluzone High Dose =>65 Years ([employer] ONLY) 10/14/2020   • Influenza, Unspecified 08/01/2017   • Pneumococcal Conjugate 13-Valent (PCV13) 04/01/2017   • Zostavax 02/15/2010       Review of Systems     Objective     /80    Pulse 78   Temp 96.5 °F (35.8 °C)   Wt 91.6 kg (202 lb)   SpO2 95%   BMI 29.83 kg/m²       Physical Exam  Vitals reviewed.   Constitutional:       General: He is not in acute distress.     Appearance: Normal appearance. He is well-developed and overweight.   HENT:      Head: Normocephalic and atraumatic.   Eyes:      General: No scleral icterus.     Conjunctiva/sclera: Conjunctivae normal.   Cardiovascular:      Rate and Rhythm: Normal rate and regular rhythm.      Pulses: Normal pulses.      Heart sounds: Murmur (Grade III/IV out of VI) heard.       Pulmonary:      Effort: Pulmonary effort is normal.      Breath sounds: Normal breath sounds. No wheezing or rhonchi.   Abdominal:      General: Bowel sounds are normal. There is no distension.      Palpations: Abdomen is soft. There is no mass.      Tenderness: There is no abdominal tenderness.   Musculoskeletal:         General: Normal range of motion.      Right lower leg: No edema.      Left lower leg: No edema.   Skin:     General: Skin is warm and dry.   Neurological:      Mental Status: He is alert and oriented to person, place, and time.   Psychiatric:         Mood and Affect: Mood and affect normal.         Behavior: Behavior normal.         Thought Content: Thought content normal.         Judgment: Judgment normal.         Result Review :     The following data was reviewed by: RUSS Herrera on 12/30/2021:    CK (11/16/2021 11:16)  CK MB (11/16/2021 11:16)  Uric acid (11/16/2021 11:16)  Antistreptolysin O screen (11/16/2021 11:16)  Rheumatoid factor (11/16/2021 11:16)  C-reactive protein (11/16/2021 11:16)  OK (11/16/2021 11:16)  Urine Drug Screen - Urine, Clean Catch (12/07/2021 11:23)    Data reviewed: Radiologic studies :   XR Shoulder 2+ View Bilateral (11/19/2021 13:55)  XR Spine Thoracic 3 View (11/19/2021 13:55)  XR Spine Lumbar Complete With Flex & Ext (11/19/2021 13:56)         Assessment and Plan      Diagnoses and all orders for  this visit:    1. DISH (diffuse idiopathic skeletal hyperostosis)  -     traMADol (ULTRAM) 50 MG tablet; Take 2 tablets by mouth 2 (Two) Times a Day As Needed for Moderate Pain .  Dispense: 120 tablet; Refill: 0    2. Musculoskeletal pain  -     traMADol (ULTRAM) 50 MG tablet; Take 2 tablets by mouth 2 (Two) Times a Day As Needed for Moderate Pain .  Dispense: 120 tablet; Refill: 0    3. High risk medication use  -     traMADol (ULTRAM) 50 MG tablet; Take 2 tablets by mouth 2 (Two) Times a Day As Needed for Moderate Pain .  Dispense: 120 tablet; Refill: 0    4. Stage 3a chronic kidney disease (HCC)  Comments:  Lab orders from nephrology  Orders:  -     traMADol (ULTRAM) 50 MG tablet; Take 2 tablets by mouth 2 (Two) Times a Day As Needed for Moderate Pain .  Dispense: 120 tablet; Refill: 0          Follow Up     Return in about 29 days (around 1/28/2022) for Recheck.  - will be due for MAW exam and medication refills at that time.     Patient has been advised to only take his tramadol as prescribed.  He is not to self adjust his medication.  If his pain is not controlled with 100 mg twice daily at the time of his follow-up, then we will refer him to pain management.    Patient was given instructions and counseling regarding his condition or for health maintenance advice. Please see specific information pulled into the AVS if appropriate.     Rudy Moore  reports that he has quit smoking. He smoked 2.00 packs per day. He has never used smokeless tobacco.

## 2022-01-27 ENCOUNTER — OFFICE VISIT (OUTPATIENT)
Dept: FAMILY MEDICINE CLINIC | Facility: CLINIC | Age: 82
End: 2022-01-27

## 2022-01-27 VITALS
DIASTOLIC BLOOD PRESSURE: 84 MMHG | WEIGHT: 199 LBS | SYSTOLIC BLOOD PRESSURE: 134 MMHG | TEMPERATURE: 96.2 F | HEIGHT: 69 IN | HEART RATE: 119 BPM | BODY MASS INDEX: 29.47 KG/M2 | OXYGEN SATURATION: 100 %

## 2022-01-27 DIAGNOSIS — Z00.00 MEDICARE ANNUAL WELLNESS VISIT, SUBSEQUENT: Primary | ICD-10-CM

## 2022-01-27 DIAGNOSIS — Z12.5 PROSTATE CANCER SCREENING: ICD-10-CM

## 2022-01-27 DIAGNOSIS — M79.18 MUSCULOSKELETAL PAIN: ICD-10-CM

## 2022-01-27 DIAGNOSIS — E11.22 TYPE 2 DIABETES MELLITUS WITH STAGE 3A CHRONIC KIDNEY DISEASE, WITHOUT LONG-TERM CURRENT USE OF INSULIN: ICD-10-CM

## 2022-01-27 DIAGNOSIS — N18.31 TYPE 2 DIABETES MELLITUS WITH STAGE 3A CHRONIC KIDNEY DISEASE, WITHOUT LONG-TERM CURRENT USE OF INSULIN: ICD-10-CM

## 2022-01-27 DIAGNOSIS — E78.5 HYPERLIPIDEMIA, UNSPECIFIED HYPERLIPIDEMIA TYPE: ICD-10-CM

## 2022-01-27 DIAGNOSIS — I73.9 PERIPHERAL VASCULAR DISEASE WITH CLAUDICATION: ICD-10-CM

## 2022-01-27 DIAGNOSIS — M48.10 DISH (DIFFUSE IDIOPATHIC SKELETAL HYPEROSTOSIS): ICD-10-CM

## 2022-01-27 DIAGNOSIS — I10 ESSENTIAL HYPERTENSION: ICD-10-CM

## 2022-01-27 DIAGNOSIS — Z79.899 HIGH RISK MEDICATION USE: ICD-10-CM

## 2022-01-27 DIAGNOSIS — Z23 NEED FOR VACCINATION: ICD-10-CM

## 2022-01-27 DIAGNOSIS — Z28.21 PNEUMOCOCCAL VACCINE REFUSED: ICD-10-CM

## 2022-01-27 DIAGNOSIS — Z91.81 AT MODERATE RISK FOR FALL: ICD-10-CM

## 2022-01-27 DIAGNOSIS — N18.31 STAGE 3A CHRONIC KIDNEY DISEASE: ICD-10-CM

## 2022-01-27 LAB
BASOPHILS # BLD AUTO: 0.03 10*3/MM3 (ref 0–0.2)
BASOPHILS NFR BLD AUTO: 0.4 % (ref 0–1.5)
CHOLEST SERPL-MCNC: 183 MG/DL (ref 0–200)
DEPRECATED RDW RBC AUTO: 44.3 FL (ref 37–54)
EOSINOPHIL # BLD AUTO: 0.1 10*3/MM3 (ref 0–0.4)
EOSINOPHIL NFR BLD AUTO: 1.2 % (ref 0.3–6.2)
ERYTHROCYTE [DISTWIDTH] IN BLOOD BY AUTOMATED COUNT: 12.5 % (ref 12.3–15.4)
HCT VFR BLD AUTO: 43.1 % (ref 37.5–51)
HDLC SERPL-MCNC: 82 MG/DL (ref 40–60)
HGB BLD-MCNC: 14.6 G/DL (ref 13–17.7)
IMM GRANULOCYTES # BLD AUTO: 0.02 10*3/MM3 (ref 0–0.05)
IMM GRANULOCYTES NFR BLD AUTO: 0.2 % (ref 0–0.5)
LDLC SERPL CALC-MCNC: 86 MG/DL (ref 0–100)
LDLC/HDLC SERPL: 1.02 {RATIO}
LYMPHOCYTES # BLD AUTO: 1.93 10*3/MM3 (ref 0.7–3.1)
LYMPHOCYTES NFR BLD AUTO: 23.9 % (ref 19.6–45.3)
MCH RBC QN AUTO: 33 PG (ref 26.6–33)
MCHC RBC AUTO-ENTMCNC: 33.9 G/DL (ref 31.5–35.7)
MCV RBC AUTO: 97.3 FL (ref 79–97)
MONOCYTES # BLD AUTO: 0.56 10*3/MM3 (ref 0.1–0.9)
MONOCYTES NFR BLD AUTO: 6.9 % (ref 5–12)
NEUTROPHILS NFR BLD AUTO: 5.45 10*3/MM3 (ref 1.7–7)
NEUTROPHILS NFR BLD AUTO: 67.4 % (ref 42.7–76)
NRBC BLD AUTO-RTO: 0 /100 WBC (ref 0–0.2)
PLATELET # BLD AUTO: 246 10*3/MM3 (ref 140–450)
PMV BLD AUTO: 9.5 FL (ref 6–12)
PSA SERPL-MCNC: 0.09 NG/ML (ref 0–4)
RBC # BLD AUTO: 4.43 10*6/MM3 (ref 4.14–5.8)
T4 FREE SERPL-MCNC: 1.1 NG/DL (ref 0.93–1.7)
TRIGL SERPL-MCNC: 86 MG/DL (ref 0–150)
TSH SERPL DL<=0.05 MIU/L-ACNC: 3.3 UIU/ML (ref 0.27–4.2)
VLDLC SERPL-MCNC: 15 MG/DL (ref 5–40)
WBC NRBC COR # BLD: 8.09 10*3/MM3 (ref 3.4–10.8)

## 2022-01-27 PROCEDURE — G0439 PPPS, SUBSEQ VISIT: HCPCS | Performed by: NURSE PRACTITIONER

## 2022-01-27 PROCEDURE — 85025 COMPLETE CBC W/AUTO DIFF WBC: CPT | Performed by: NURSE PRACTITIONER

## 2022-01-27 PROCEDURE — 1159F MED LIST DOCD IN RCRD: CPT | Performed by: NURSE PRACTITIONER

## 2022-01-27 PROCEDURE — 1170F FXNL STATUS ASSESSED: CPT | Performed by: NURSE PRACTITIONER

## 2022-01-27 PROCEDURE — 80061 LIPID PANEL: CPT | Performed by: NURSE PRACTITIONER

## 2022-01-27 PROCEDURE — 1125F AMNT PAIN NOTED PAIN PRSNT: CPT | Performed by: NURSE PRACTITIONER

## 2022-01-27 PROCEDURE — 84443 ASSAY THYROID STIM HORMONE: CPT | Performed by: NURSE PRACTITIONER

## 2022-01-27 PROCEDURE — 84439 ASSAY OF FREE THYROXINE: CPT | Performed by: NURSE PRACTITIONER

## 2022-01-27 PROCEDURE — 90471 IMMUNIZATION ADMIN: CPT | Performed by: NURSE PRACTITIONER

## 2022-01-27 PROCEDURE — G0103 PSA SCREENING: HCPCS | Performed by: NURSE PRACTITIONER

## 2022-01-27 PROCEDURE — 99214 OFFICE O/P EST MOD 30 MIN: CPT | Performed by: NURSE PRACTITIONER

## 2022-01-27 PROCEDURE — 90715 TDAP VACCINE 7 YRS/> IM: CPT | Performed by: NURSE PRACTITIONER

## 2022-01-27 RX ORDER — CILOSTAZOL 100 MG/1
100 TABLET ORAL 2 TIMES DAILY
Qty: 180 TABLET | Refills: 0 | Status: SHIPPED | OUTPATIENT
Start: 2022-01-27 | End: 2022-05-11

## 2022-01-27 RX ORDER — METOPROLOL TARTRATE 50 MG/1
50 TABLET, FILM COATED ORAL DAILY
Qty: 90 TABLET | Refills: 0 | Status: SHIPPED | OUTPATIENT
Start: 2022-01-27 | End: 2022-05-11

## 2022-01-27 RX ORDER — QUINAPRIL 40 MG/1
40 TABLET ORAL DAILY
Qty: 90 TABLET | Refills: 0 | Status: SHIPPED | OUTPATIENT
Start: 2022-01-27 | End: 2022-05-11

## 2022-01-27 RX ORDER — METHOCARBAMOL 500 MG/1
500 TABLET, FILM COATED ORAL 4 TIMES DAILY PRN
Qty: 180 TABLET | Refills: 0 | Status: SHIPPED | OUTPATIENT
Start: 2022-01-27 | End: 2023-02-09

## 2022-01-27 RX ORDER — ATORVASTATIN CALCIUM 20 MG/1
20 TABLET, FILM COATED ORAL NIGHTLY
Qty: 90 TABLET | Refills: 0 | Status: SHIPPED | OUTPATIENT
Start: 2022-01-27 | End: 2022-05-11

## 2022-01-27 RX ORDER — TRAMADOL HYDROCHLORIDE 50 MG/1
100 TABLET ORAL 2 TIMES DAILY PRN
Qty: 120 TABLET | Refills: 2 | Status: SHIPPED | OUTPATIENT
Start: 2022-01-27 | End: 2022-05-02 | Stop reason: SDUPTHER

## 2022-01-27 NOTE — PROGRESS NOTES
The ABCs of the Annual Wellness Visit  Subsequent Medicare Wellness Visit    Chief Complaint   Patient presents with   • Medicare Wellness-subsequent      Subjective    History of Present Illness:  Rudy Moore is a 81 y.o. male who presents for a Subsequent Medicare Wellness Visit.    The following portions of the patient's history were reviewed and   updated as appropriate: allergies, current medications, past family history, past medical history, past social history, past surgical history and problem list.    Compared to one year ago, the patient feels his physical   health is better. He feels like it is better since he had his neck and legs worked on - had endarterectomies. His pain is now better controlled as well. He is currently taking Tramadol 100mg BID. He is still having pain, but not as bad as it was. He cannot sleep on his back because it hurts his shoulders. He will have to alternate which side he sleeps on d/t sleeping on his sides hurt too. His recent x-rays showed evidence of DISH. He has pain in the neck, upper back, and shoulders. On average his pain is now a 5 on 1-10 scale, and it was a 10. He now reports that the pain is tolerable. He is able to do things that he needs to do. He cannot reach behind his back due to the pain. He has difficulty putting on some clothing items, such as his shirts, coats, and coveralls.     Compared to one year ago, the patient feels his mental   health is the same.    Recent Hospitalizations:  He was not admitted to the hospital during the last year.     He also needs refills on his medications.    He is status post right carotid endarterectomy in August 2020, and then bilateral femoral endarterectomies with iliac stenting in October 2020.  He has really not had follow-up since December 2020.  They had wanted follow-up 6 months postop, but they never called him, and he denies referral back at this time.  He reports that his legs are doing a lot better since  having his surgery.  He continues to take cilostazol as prescribed.  Denies claudication.    He has type 2 diabetes, which has been diet controlled in the past.  Most recent A1c was just above 6.5%.  He checks his blood sugar 2-3 times per week and it is usually less than 120.  He is due an eye exam and is aware of this.  He denies any neuropathy symptoms.  Denies any polyuria, polydipsia, or polyphagia.    His blood pressure is controlled on exam today.  He is currently prescribed metoprolol and quinapril for hypertension.  He is taking Lipitor 20 mg nightly for dyslipidemia.  He denies any side effects with his medication at this time.  Denies chest pain, palpitations, headaches, dizziness, shortness of breath, or swelling in the legs.      Current Medical Providers:  Patient Care Team:  Kim Dailey APRN as PCP - General (Nurse Practitioner)    Outpatient Medications Prior to Visit   Medication Sig Dispense Refill   • aspirin (aspirin) 81 MG EC tablet Take 81 mg by mouth Daily.     • multivitamin with minerals (VITRUM 50+ Senior Multi) tablet tablet Take 1 tablet by mouth Daily.     • atorvastatin (LIPITOR) 20 MG tablet Take 1 tablet by mouth Every Night. 90 tablet 1   • cilostazol (PLETAL) 100 MG tablet Take 1 tablet by mouth 2 (Two) Times a Day. 180 tablet 1   • methocarbamol (Robaxin) 500 MG tablet Take 1 tablet by mouth 4 (Four) Times a Day As Needed for Muscle Spasms. 40 tablet 0   • metoprolol tartrate (LOPRESSOR) 50 MG tablet Take 1 tablet by mouth Daily. 90 tablet 1   • quinapril (ACCUPRIL) 40 MG tablet Take 1 tablet by mouth Daily. 90 tablet 1   • traMADol (ULTRAM) 50 MG tablet Take 2 tablets by mouth 2 (Two) Times a Day As Needed for Moderate Pain . 120 tablet 0     No facility-administered medications prior to visit.       Opioid medication/s are on active medication list.  and I have evaluated his active treatment plan and pain score trends (see table).  There were no vitals filed for this  "visit.  I have reviewed the chart for potential of high risk medication and harmful drug interactions in the elderly.            Aspirin is on active medication list. Aspirin use is indicated based on review of current medical condition/s. Pros and cons of this therapy have been discussed today. Benefits of this medication outweigh potential harm.  Patient has been encouraged to continue taking this medication.      Patient Active Problem List   Diagnosis   • BPH (benign prostatic hyperplasia)   • Essential hypertension   • GERD (gastroesophageal reflux disease)   • Stage 3a chronic kidney disease (HCC)   • Type 2 diabetes mellitus without complication (HCC)     Advance Care Planning  Advance Directive is not on file.  ACP discussion was held with the patient during this visit. Patient does not have an advance directive, information provided.          Objective    Vitals:    01/27/22 1034   BP: 134/84   Pulse: 119   Temp: 96.2 °F (35.7 °C)   SpO2: 100%   Weight: 90.3 kg (199 lb)   Height: 174 cm (68.5\")     BMI Readings from Last 1 Encounters:   01/27/22 29.82 kg/m²   BMI is above normal parameters. Recommendations include: exercise counseling and nutrition counseling.     Does the patient have evidence of cognitive impairment? No    Physical Exam  Vitals reviewed.   Constitutional:       General: He is not in acute distress.     Appearance: Normal appearance. He is well-developed. He is obese.   HENT:      Head: Normocephalic and atraumatic.   Eyes:      General: No scleral icterus.     Conjunctiva/sclera: Conjunctivae normal.   Neck:      Thyroid: No thyroid mass, thyromegaly or thyroid tenderness.      Vascular: No carotid bruit.      Trachea: Trachea normal.   Cardiovascular:      Rate and Rhythm: Normal rate and regular rhythm.      Pulses: Normal pulses.      Heart sounds: No murmur heard.      Pulmonary:      Effort: Pulmonary effort is normal.      Breath sounds: Normal breath sounds. No wheezing or rhonchi. "   Musculoskeletal:         General: Normal range of motion.      Cervical back: Normal range of motion and neck supple.      Right lower leg: No edema.      Left lower leg: No edema.   Lymphadenopathy:      Cervical: No cervical adenopathy.   Skin:     General: Skin is warm and dry.   Neurological:      Mental Status: He is alert and oriented to person, place, and time.   Psychiatric:         Mood and Affect: Mood and affect normal.         Behavior: Behavior normal.         Thought Content: Thought content normal.         Judgment: Judgment normal.           Common labs    Common Labsle 8/16/21 8/16/21 8/16/21 8/16/21 8/16/21 11/16/21 12/30/21 12/30/21 12/30/21    0825 0825 0825 0825 0825  0955 [id number] 0955   Glucose    127 (A)   123 (A)     BUN    21   16     Creatinine    1.44 (A)   1.27     eGFR Non  Am    47 (A)   55 (A)     Sodium    139   140     Potassium    4.8   4.8     Chloride    102   100     Calcium    9.2   9.8     Albumin    4.30   4.00     Total Bilirubin    0.7   0.5     Alkaline Phosphatase    67   106     AST (SGOT)    22   18     ALT (SGPT)    22   21     WBC 6.06           Hemoglobin 14.1           Hematocrit 41.1           Platelets 207           Total Cholesterol     172       Triglycerides     123       HDL Cholesterol     65 (A)       LDL Cholesterol      85       Hemoglobin A1C  6.24 (A)       6.57 (A)   Microalbumin, Urine   <1.2     2.7    Uric Acid      5.0      (A) Abnormal value            Last Urine Toxicity     LAST URINE TOXICITY RESULTS Latest Ref Rng & Units 12/7/2021 8/16/2021    CREATININE UR mg/dL - 148.7    BARBITURATES SCREEN Negative Negative -    BENZODIAZEPINE SCREEN, URINE Negative Negative -    COCAINE SCREEN, URINE Negative Negative -    METHADONE SCREEN, URINE Negative Negative -            HEALTH RISK ASSESSMENT    Smoking Status:  Social History     Tobacco Use   Smoking Status Former Smoker   • Packs/day: 2.00   Smokeless Tobacco Never Used   Tobacco Comment     SMOKES 1 TO 2 PACKS PER DAY, CURRENTLY USES OTHER TOBACCO PRODUCTS      Alcohol Consumption:  Social History     Substance and Sexual Activity   Alcohol Use Yes    Comment: DRINKS ALCHOL, 8-14 DRINKS PER WEEK      Fall Risk Screen:    VIOLETTA Fall Risk Assessment was completed, and patient is at HIGH risk for falls. Assessment completed on:1/27/2022    Depression Screening:  PHQ-2/PHQ-9 Depression Screening 1/27/2022   Little interest or pleasure in doing things 0   Feeling down, depressed, or hopeless 0   Total Score 0       Health Habits and Functional and Cognitive Screening:  Functional & Cognitive Status 1/27/2022   Do you have difficulty preparing food and eating? No   Do you have difficulty bathing yourself, getting dressed or grooming yourself? Yes   Do you have difficulty using the toilet? No   Do you have difficulty moving around from place to place? No   Do you have trouble with steps or getting out of a bed or a chair? No   Current Diet Well Balanced Diet   Dental Exam Up to date   Eye Exam Up to date   Exercise (times per week) 7 times per week   Current Exercises Include Other   Do you need help using the phone?  No   Are you deaf or do you have serious difficulty hearing?  No   Do you need help with transportation? No   Do you need help shopping? No   Do you need help preparing meals?  No   Do you need help with housework?  No   Do you need help with laundry? No   Do you need help taking your medications? No   Do you need help managing money? No   Do you ever drive or ride in a car without wearing a seat belt? No       Age-appropriate Screening Schedule:  Refer to the list below for future screening recommendations based on patient's age, sex and/or medical conditions. Orders for these recommended tests are listed in the plan section. The patient has been provided with a written plan.    Health Maintenance   Topic Date Due   • DIABETIC EYE EXAM  08/06/2021   • ZOSTER VACCINE (2 of 3) 01/27/2022  (Originally 4/12/2010)   • HEMOGLOBIN A1C  06/30/2022   • LIPID PANEL  08/16/2022   • URINE MICROALBUMIN  12/30/2022   • TDAP/TD VACCINES (2 - Td or Tdap) 01/27/2032   • INFLUENZA VACCINE  Completed              Assessment/Plan   CMS Preventative Services Quick Reference  Risk Factors Identified During Encounter    Cardiovascular Disease  Chronic Pain   Fall Risk-High or Moderate  Glaucoma or Family History of Glaucoma  Immunizations Discussed/Encouraged (specific Immunizations; Tdap, Pneumococcal 23 and Shingrix  Obesity/Overweight      The above risks/problems have been discussed with the patient.  Follow up actions/plans if indicated are seen below in the Assessment/Plan Section.  Pertinent information has been shared with the patient in the After Visit Summary.    Diagnoses and all orders for this visit:    1. Medicare annual wellness visit, subsequent (Primary)    2. Prostate cancer screening  -     PSA Screen    3. Need for vaccination  -     Tdap Vaccine Greater Than or Equal To 8yo IM    4. Pneumococcal vaccine refused  Comments:  Shingles vaccine also declined.     5. At moderate risk for fall    6. DISH (diffuse idiopathic skeletal hyperostosis)  -     traMADol (ULTRAM) 50 MG tablet; Take 2 tablets by mouth 2 (Two) Times a Day As Needed for Moderate Pain .  Dispense: 120 tablet; Refill: 2    7. Musculoskeletal pain  -     traMADol (ULTRAM) 50 MG tablet; Take 2 tablets by mouth 2 (Two) Times a Day As Needed for Moderate Pain .  Dispense: 120 tablet; Refill: 2  -     methocarbamol (Robaxin) 500 MG tablet; Take 1 tablet by mouth 4 (Four) Times a Day As Needed for Muscle Spasms.  Dispense: 180 tablet; Refill: 0    8. Stage 3a chronic kidney disease (HCC)  -     traMADol (ULTRAM) 50 MG tablet; Take 2 tablets by mouth 2 (Two) Times a Day As Needed for Moderate Pain .  Dispense: 120 tablet; Refill: 2    9. High risk medication use  -     traMADol (ULTRAM) 50 MG tablet; Take 2 tablets by mouth 2 (Two) Times a Day  As Needed for Moderate Pain .  Dispense: 120 tablet; Refill: 2    10. Essential hypertension  -     metoprolol tartrate (LOPRESSOR) 50 MG tablet; Take 1 tablet by mouth Daily.  Dispense: 90 tablet; Refill: 0  -     quinapril (ACCUPRIL) 40 MG tablet; Take 1 tablet by mouth Daily.  Dispense: 90 tablet; Refill: 0  -     CBC Auto Differential  -     TSH+Free T4    11. Hyperlipidemia, unspecified hyperlipidemia type  -     atorvastatin (LIPITOR) 20 MG tablet; Take 1 tablet by mouth Every Night.  Dispense: 90 tablet; Refill: 0  -     Lipid Panel    12. Peripheral vascular disease with claudication (HCC)  -     cilostazol (PLETAL) 100 MG tablet; Take 1 tablet by mouth 2 (Two) Times a Day.  Dispense: 180 tablet; Refill: 0    13. Type 2 diabetes mellitus with stage 3a chronic kidney disease, without long-term current use of insulin (HCC)  Comments:  Encouraged annual eye exam  Orders:  -     metFORMIN (Glucophage) 850 MG tablet; Take 1 tablet by mouth Daily With Dinner.  Dispense: 90 tablet; Refill: 0  -     CBC Auto Differential  -     TSH+Free T4        Follow Up:   Return in about 3 months (around 4/27/2022) for Recheck.     An After Visit Summary and PPPS were made available to the patient.

## 2022-01-27 NOTE — PROGRESS NOTES
Venipuncture Blood Specimen Collection  Venipuncture performed inright arm  by Lillie Tinsley with good hemostasis. Patient tolerated the procedure well without complications.   01/27/22   Lillie Tinsley

## 2022-01-27 NOTE — PATIENT INSTRUCTIONS

## 2022-04-11 ENCOUNTER — TRANSCRIBE ORDERS (OUTPATIENT)
Dept: FAMILY MEDICINE CLINIC | Facility: CLINIC | Age: 82
End: 2022-04-11

## 2022-04-11 DIAGNOSIS — N18.30 STAGE 3 CHRONIC KIDNEY DISEASE, UNSPECIFIED WHETHER STAGE 3A OR 3B CKD: Primary | ICD-10-CM

## 2022-04-11 DIAGNOSIS — I10 HYPERTENSION, ESSENTIAL: ICD-10-CM

## 2022-04-12 ENCOUNTER — OFFICE VISIT (OUTPATIENT)
Dept: FAMILY MEDICINE CLINIC | Facility: CLINIC | Age: 82
End: 2022-04-12

## 2022-04-12 VITALS
TEMPERATURE: 97.5 F | OXYGEN SATURATION: 98 % | SYSTOLIC BLOOD PRESSURE: 146 MMHG | HEART RATE: 75 BPM | DIASTOLIC BLOOD PRESSURE: 70 MMHG | WEIGHT: 198.2 LBS | HEIGHT: 69 IN | BODY MASS INDEX: 29.36 KG/M2

## 2022-04-12 DIAGNOSIS — R42 ORTHOSTATIC DIZZINESS: ICD-10-CM

## 2022-04-12 DIAGNOSIS — E78.5 HYPERLIPIDEMIA, UNSPECIFIED HYPERLIPIDEMIA TYPE: ICD-10-CM

## 2022-04-12 DIAGNOSIS — N18.31 STAGE 3A CHRONIC KIDNEY DISEASE: ICD-10-CM

## 2022-04-12 DIAGNOSIS — R94.31 ABNORMAL EKG: ICD-10-CM

## 2022-04-12 DIAGNOSIS — I10 ESSENTIAL HYPERTENSION: Primary | ICD-10-CM

## 2022-04-12 DIAGNOSIS — Z79.899 HIGH RISK MEDICATION USE: ICD-10-CM

## 2022-04-12 DIAGNOSIS — M79.18 MUSCULOSKELETAL PAIN: ICD-10-CM

## 2022-04-12 DIAGNOSIS — R11.2 NAUSEA AND VOMITING, UNSPECIFIED VOMITING TYPE: ICD-10-CM

## 2022-04-12 DIAGNOSIS — I73.9 PERIPHERAL VASCULAR DISEASE WITH CLAUDICATION: ICD-10-CM

## 2022-04-12 DIAGNOSIS — M48.10 DISH (DIFFUSE IDIOPATHIC SKELETAL HYPEROSTOSIS): ICD-10-CM

## 2022-04-12 LAB
ALBUMIN SERPL-MCNC: 4 G/DL (ref 3.5–5.2)
ALBUMIN/GLOB SERPL: 1.3 G/DL
ALP SERPL-CCNC: 143 U/L (ref 39–117)
ALT SERPL W P-5'-P-CCNC: 170 U/L (ref 1–41)
ANION GAP SERPL CALCULATED.3IONS-SCNC: 13.2 MMOL/L (ref 5–15)
AST SERPL-CCNC: 54 U/L (ref 1–40)
BASOPHILS # BLD AUTO: 0.05 10*3/MM3 (ref 0–0.2)
BASOPHILS NFR BLD AUTO: 0.5 % (ref 0–1.5)
BILIRUB SERPL-MCNC: 1.3 MG/DL (ref 0–1.2)
BILIRUB UR QL STRIP: NEGATIVE
BUN SERPL-MCNC: 15 MG/DL (ref 8–23)
BUN/CREAT SERPL: 10.6 (ref 7–25)
CALCIUM SPEC-SCNC: 9.7 MG/DL (ref 8.6–10.5)
CHLORIDE SERPL-SCNC: 97 MMOL/L (ref 98–107)
CK SERPL-CCNC: 50 U/L (ref 20–200)
CLARITY UR: ABNORMAL
CO2 SERPL-SCNC: 27.8 MMOL/L (ref 22–29)
COLOR UR: YELLOW
CREAT SERPL-MCNC: 1.41 MG/DL (ref 0.76–1.27)
CREAT UR-MCNC: 272 MG/DL
DEPRECATED RDW RBC AUTO: 45.7 FL (ref 37–54)
EGFRCR SERPLBLD CKD-EPI 2021: 50.4 ML/MIN/1.73
EOSINOPHIL # BLD AUTO: 0.14 10*3/MM3 (ref 0–0.4)
EOSINOPHIL NFR BLD AUTO: 1.3 % (ref 0.3–6.2)
ERYTHROCYTE [DISTWIDTH] IN BLOOD BY AUTOMATED COUNT: 12.6 % (ref 12.3–15.4)
GLOBULIN UR ELPH-MCNC: 3.1 GM/DL
GLUCOSE SERPL-MCNC: 133 MG/DL (ref 65–99)
GLUCOSE UR STRIP-MCNC: NEGATIVE MG/DL
HCT VFR BLD AUTO: 42.4 % (ref 37.5–51)
HGB BLD-MCNC: 14.1 G/DL (ref 13–17.7)
HGB UR QL STRIP.AUTO: NEGATIVE
IMM GRANULOCYTES # BLD AUTO: 0.1 10*3/MM3 (ref 0–0.05)
IMM GRANULOCYTES NFR BLD AUTO: 0.9 % (ref 0–0.5)
KETONES UR QL STRIP: ABNORMAL
LEUKOCYTE ESTERASE UR QL STRIP.AUTO: NEGATIVE
LYMPHOCYTES # BLD AUTO: 2.17 10*3/MM3 (ref 0.7–3.1)
LYMPHOCYTES NFR BLD AUTO: 20.3 % (ref 19.6–45.3)
MCH RBC QN AUTO: 33.2 PG (ref 26.6–33)
MCHC RBC AUTO-ENTMCNC: 33.3 G/DL (ref 31.5–35.7)
MCV RBC AUTO: 99.8 FL (ref 79–97)
MONOCYTES # BLD AUTO: 0.96 10*3/MM3 (ref 0.1–0.9)
MONOCYTES NFR BLD AUTO: 9 % (ref 5–12)
NEUTROPHILS NFR BLD AUTO: 68 % (ref 42.7–76)
NEUTROPHILS NFR BLD AUTO: 7.29 10*3/MM3 (ref 1.7–7)
NITRITE UR QL STRIP: NEGATIVE
NRBC BLD AUTO-RTO: 0 /100 WBC (ref 0–0.2)
PH UR STRIP.AUTO: 6 [PH] (ref 5–8)
PLATELET # BLD AUTO: 287 10*3/MM3 (ref 140–450)
PMV BLD AUTO: 9.6 FL (ref 6–12)
POTASSIUM SERPL-SCNC: 4.6 MMOL/L (ref 3.5–5.2)
PROT ?TM UR-MCNC: 48.1 MG/DL
PROT SERPL-MCNC: 7.1 G/DL (ref 6–8.5)
PROT UR QL STRIP: ABNORMAL
PROT/CREAT UR: 0.18 MG/G{CREAT}
RBC # BLD AUTO: 4.25 10*6/MM3 (ref 4.14–5.8)
SODIUM SERPL-SCNC: 138 MMOL/L (ref 136–145)
SP GR UR STRIP: >=1.03 (ref 1–1.03)
TROPONIN T SERPL-MCNC: <0.01 NG/ML (ref 0–0.03)
UROBILINOGEN UR QL STRIP: ABNORMAL
WBC NRBC COR # BLD: 10.71 10*3/MM3 (ref 3.4–10.8)

## 2022-04-12 PROCEDURE — 93000 ELECTROCARDIOGRAM COMPLETE: CPT | Performed by: NURSE PRACTITIONER

## 2022-04-12 PROCEDURE — 99214 OFFICE O/P EST MOD 30 MIN: CPT | Performed by: NURSE PRACTITIONER

## 2022-04-12 PROCEDURE — 82043 UR ALBUMIN QUANTITATIVE: CPT | Performed by: INTERNAL MEDICINE

## 2022-04-12 PROCEDURE — 81001 URINALYSIS AUTO W/SCOPE: CPT | Performed by: INTERNAL MEDICINE

## 2022-04-12 PROCEDURE — 80053 COMPREHEN METABOLIC PANEL: CPT | Performed by: INTERNAL MEDICINE

## 2022-04-12 PROCEDURE — 82570 ASSAY OF URINE CREATININE: CPT | Performed by: INTERNAL MEDICINE

## 2022-04-12 PROCEDURE — 85025 COMPLETE CBC W/AUTO DIFF WBC: CPT | Performed by: INTERNAL MEDICINE

## 2022-04-12 PROCEDURE — 84156 ASSAY OF PROTEIN URINE: CPT | Performed by: INTERNAL MEDICINE

## 2022-04-12 PROCEDURE — 84484 ASSAY OF TROPONIN QUANT: CPT | Performed by: INTERNAL MEDICINE

## 2022-04-12 PROCEDURE — 82550 ASSAY OF CK (CPK): CPT | Performed by: INTERNAL MEDICINE

## 2022-04-12 PROCEDURE — 82553 CREATINE MB FRACTION: CPT | Performed by: INTERNAL MEDICINE

## 2022-04-12 NOTE — PROGRESS NOTES
Chief Complaint  GI Problem (Having stomach issues since Sunday.  Vomited several times.  Thinks he may have eaten bad green onions.) and Pain (Musculoskeletal pain follow up)    Subjective            Rudy Moore presents to [facility]  History of Present Illness     He believes that he got Salmonella - he ate a salad last Sunday - states that the lettuce had some slime on it, but he thought he got it all off. A few hours later his stomach was very upset - he drank some water and that helped him to vomit. He was gagging and tasting onion, but he couldn't vomit until he drank the water. He only vomited on Sunday/Monday AM - vomiting at 2AM. He couldn't sit down or lay down or anything until he vomited. No diarrhea. Stomach is still sore, but it does not hurt. Feels like it has been stretched or something.     He is currently taking Tramadol 100mg BID. He states that on average he does not really have a lot of pain anymore - less than he used to now that he has been taking it routinely. Pain is less than a 5 on 1-10 scale. His wife states that he still has problems putting on a shirt, jacket, or coveralls - anything that requires to reach behind him. He is able to sleep on his back now, and he wasn't able to do this before. He can sleep on his sides as well. He is still waiting on the rheumatologist - appointment next month - for his initial evaluation of DISH. He occasionally has constipation, but not persistent. Denies any somnolence with use.     He does describe orthostatic lightheadedness - He mostly notices symptoms when he gets up really quickly - bending over, standing from sitting, or stepping up - he isn't sure if it is r/t the pain medication, or his heart. He does have CVD - known to Dr. Randolph - but last saw in 2018 for pre-op eval for carotid endarterectomy. No c/o chest pain, palpitations, headaches, or swelling in the legs. If he does a whole lot of work he  will be SOBOE. Sometimes fatigued. Not passing out.     Past Medical History:   Diagnosis Date   • CAD (coronary artery disease)    • Cataract    • Diabetes mellitus, type 2 (HCC)    • Essential hypertension 07/23/2020   • GERD (gastroesophageal reflux disease)    • Hyperlipidemia    • Kidney stones    • Peripheral arterial disease (HCC) 07/23/2020   • Seasonal allergies        No Known Allergies     Past Surgical History:   Procedure Laterality Date   • CARDIAC SURGERY      BYPASS   • CAROTID ENDARTERECTOMY Right 08/2020   • FEMORAL ENDARTERECTOMY Bilateral 10/2020   • ILIAC ARTERY STENT Bilateral 10/2020        Social History     Tobacco Use   • Smoking status: Former Smoker     Packs/day: 2.00   • Smokeless tobacco: Never Used   • Tobacco comment: SMOKES 1 TO 2 PACKS PER DAY, CURRENTLY USES OTHER TOBACCO PRODUCTS    Substance Use Topics   • Alcohol use: Yes     Comment: DRINKS ALCHOL, 8-14 DRINKS PER WEEK    • Drug use: Never       Family History   Problem Relation Age of Onset   • Arthritis Mother    • Alcohol abuse Father    • Asthma Sister    • COPD Sister    • Breast cancer Sister    • Diabetes type I Brother    • Stroke Brother         Health Maintenance Due   Topic Date Due   • ZOSTER VACCINE (2 of 3) 04/12/2010   • DIABETIC EYE EXAM  08/06/2021        Current Outpatient Medications on File Prior to Visit   Medication Sig   • aspirin 81 MG EC tablet Take 81 mg by mouth Daily.   • atorvastatin (LIPITOR) 20 MG tablet Take 1 tablet by mouth Every Night.   • cilostazol (PLETAL) 100 MG tablet Take 1 tablet by mouth 2 (Two) Times a Day.   • metFORMIN (Glucophage) 850 MG tablet Take 1 tablet by mouth Daily With Dinner.   • metoprolol tartrate (LOPRESSOR) 50 MG tablet Take 1 tablet by mouth Daily.   • multivitamin with minerals tablet tablet Take 1 tablet by mouth Daily.   • quinapril (ACCUPRIL) 40 MG tablet Take 1 tablet by mouth Daily.   • traMADol (ULTRAM) 50 MG tablet Take 2 tablets by mouth 2 (Two) Times a Day  "As Needed for Moderate Pain .   • methocarbamol (Robaxin) 500 MG tablet Take 1 tablet by mouth 4 (Four) Times a Day As Needed for Muscle Spasms.     No current facility-administered medications on file prior to visit.       Immunization History   Administered Date(s) Administered   • COVID-19 (MODERNA) 1st, 2nd, 3rd Dose Only 02/09/2021, 02/09/2021, 03/11/2021, 03/11/2021, 11/05/2021   • Fluad Quad 65+ 10/18/2021   • Fluzone High Dose =>65 Years (Vaxcare ONLY) 10/14/2020   • Fluzone High-Dose 65+yrs 10/14/2020   • Influenza, Unspecified 08/01/2017   • Pneumococcal Conjugate 13-Valent (PCV13) 04/01/2017   • Tdap 01/27/2022   • Zostavax 02/15/2010       Review of Systems     Objective     /70 (BP Location: Left arm, Patient Position: Sitting, Cuff Size: Adult)   Pulse 75   Temp 97.5 °F (36.4 °C) (Temporal)   Ht 174 cm (68.5\")   Wt 89.9 kg (198 lb 3.2 oz)   SpO2 98%   BMI 29.70 kg/m²       Vitals:    04/12/22 1032 04/12/22 1129 04/12/22 1130 04/12/22 1131   Orthostatic BP:  130/70 106/60 90/50   Orthostatic Pulse:  110 115 123   Patient Position: Sitting Lying Sitting Standing       Physical Exam  Vitals reviewed.   Constitutional:       General: He is not in acute distress.     Appearance: He is well-developed and overweight.   HENT:      Head: Normocephalic and atraumatic.   Eyes:      General: No scleral icterus.     Extraocular Movements: Extraocular movements intact.      Conjunctiva/sclera: Conjunctivae normal.   Neck:      Thyroid: No thyroid mass, thyromegaly or thyroid tenderness.      Vascular: No carotid bruit.      Trachea: Trachea normal.   Cardiovascular:      Rate and Rhythm: Normal rate and regular rhythm.      Pulses: Normal pulses.      Heart sounds: Murmur (Grade III/IV murmur noted) heard.   Pulmonary:      Effort: Pulmonary effort is normal. No respiratory distress.      Breath sounds: Normal breath sounds. No wheezing, rhonchi or rales.   Abdominal:      General: Bowel sounds are " normal. There is no distension.      Palpations: Abdomen is soft. There is no mass.      Tenderness: There is no abdominal tenderness. There is no guarding or rebound.      Hernia: No hernia is present.   Musculoskeletal:         General: Normal range of motion.      Cervical back: Normal range of motion and neck supple.      Right lower leg: No edema.      Left lower leg: No edema.   Lymphadenopathy:      Cervical: No cervical adenopathy.   Skin:     General: Skin is warm and dry.   Neurological:      Mental Status: He is alert and oriented to person, place, and time.   Psychiatric:         Mood and Affect: Mood and affect normal.         Behavior: Behavior normal.         Thought Content: Thought content normal.         Judgment: Judgment normal.         Result Review :     The following data was reviewed by: RUSS Herrera on 04/12/2022:    Common labs    Common Labsle 11/16/21 12/30/21 12/30/21 12/30/21 1/27/22 1/27/22 1/27/22     0955 0955 0955 1155 1155 1155   Glucose  123 (A)        BUN  16        Creatinine  1.27        eGFR Non  Am  55 (A)        Sodium  140        Potassium  4.8        Chloride  100        Calcium  9.8        Albumin  4.00        Total Bilirubin  0.5        Alkaline Phosphatase  106        AST (SGOT)  18        ALT (SGPT)  21        WBC     8.09     Hemoglobin     14.6     Hematocrit     43.1     Platelets     246     Total Cholesterol      183    Triglycerides      86    HDL Cholesterol      82 (A)    LDL Cholesterol       86    Hemoglobin A1C    6.57 (A)      Microalbumin, Urine   2.7       PSA       0.091   Uric Acid 5.0         (A) Abnormal value            Urine Drug Screen - Urine, Clean Catch (12/07/2021 11:23)    Data reviewed: Consultant notes :   Conversion Encounter with Fabiano Randolph MD (06/19/2018)  Office Visit Converted with Fabiano Randolph MD (05/04/2018)      ECG 12 Lead    Date/Time: 4/12/2022 11:32 AM  Performed by: Kim Dailey,  APRN  Authorized by: Kim Dailey, APRSINCERE   Comparison: not compared with previous ECG   Previous ECG: no previous ECG available  Rhythm comments: sinus or ectopic atrial tachycardia  Rate: tachycardic  Other findings: low voltage and T wave abnormality    Clinical impression: abnormal EKG              Assessment and Plan      Diagnoses and all orders for this visit:    1. Essential hypertension (Primary)  -     ECG 12 Lead  -     Ambulatory Referral to Cardiology  -     Adult Transthoracic Echo Complete W/ Cont if Necessary Per Protocol; Future    2. Hyperlipidemia, unspecified hyperlipidemia type    3. Peripheral vascular disease with claudication (HCC)  -     ECG 12 Lead    4. Orthostatic dizziness  -     ECG 12 Lead  -     Ambulatory Referral to Cardiology  -     Adult Transthoracic Echo Complete W/ Cont if Necessary Per Protocol; Future    5. Abnormal EKG  -     Ambulatory Referral to Cardiology  -     Adult Transthoracic Echo Complete W/ Cont if Necessary Per Protocol; Future  -     Troponin  -     CK  -     CK MB    6. Nausea and vomiting, unspecified vomiting type  Comments:  currently resolved - suspected r/t food    7. DISH (diffuse idiopathic skeletal hyperostosis)    8. Musculoskeletal pain    9. Stage 3a chronic kidney disease (HCC)    10. High risk medication use            Follow Up     He recently just received his last refill of tramadol on March 30, 2022.  Refills will be deferred until he has only 2 to 3 days left of his current prescription.  He will call at that time.    I am going to refer him to cardiology regarding the orthostatic dizziness and abnormal EKG.  I am going to go ahead and order an echo as his echo in 2018 was abnormal.  Advised the patient to use caution when changing positions, and to move slowly to avoid presyncope or syncope.  We discussed that his pain medication could be contributing to his dizziness; however, with his history of cardiovascular disease, I feel cardiac  evaluation is warranted at this time.  He voices understanding and is in agreement.    Patient was given instructions and counseling regarding his condition or for health maintenance advice. Please see specific information pulled into the AVS if appropriate.     Rudy Moore  reports that he has quit smoking. He smoked 2.00 packs per day. He has never used smokeless tobacco.

## 2022-04-13 DIAGNOSIS — R42 ORTHOSTATIC DIZZINESS: ICD-10-CM

## 2022-04-13 DIAGNOSIS — I73.9 PERIPHERAL VASCULAR DISEASE WITH CLAUDICATION: ICD-10-CM

## 2022-04-13 DIAGNOSIS — E78.5 HYPERLIPIDEMIA, UNSPECIFIED HYPERLIPIDEMIA TYPE: ICD-10-CM

## 2022-04-13 DIAGNOSIS — I10 ESSENTIAL HYPERTENSION: Primary | ICD-10-CM

## 2022-04-13 DIAGNOSIS — R94.31 ABNORMAL EKG: ICD-10-CM

## 2022-04-13 LAB
ALBUMIN UR-MCNC: 12.3 MG/DL
BACTERIA UR QL AUTO: NORMAL /HPF
CK MB SERPL-CCNC: 1.3 NG/ML
CREAT UR-MCNC: 253.5 MG/DL
HYALINE CASTS UR QL AUTO: NORMAL /LPF
MICROALBUMIN/CREAT UR: 48.5 MG/G
RBC # UR STRIP: NORMAL /HPF
REF LAB TEST METHOD: NORMAL
SQUAMOUS #/AREA URNS HPF: NORMAL /HPF
WBC # UR STRIP: NORMAL /HPF

## 2022-05-02 ENCOUNTER — TELEPHONE (OUTPATIENT)
Dept: FAMILY MEDICINE CLINIC | Facility: CLINIC | Age: 82
End: 2022-05-02

## 2022-05-02 DIAGNOSIS — N18.31 STAGE 3A CHRONIC KIDNEY DISEASE: ICD-10-CM

## 2022-05-02 DIAGNOSIS — Z79.899 HIGH RISK MEDICATION USE: ICD-10-CM

## 2022-05-02 DIAGNOSIS — M79.18 MUSCULOSKELETAL PAIN: ICD-10-CM

## 2022-05-02 DIAGNOSIS — M48.10 DISH (DIFFUSE IDIOPATHIC SKELETAL HYPEROSTOSIS): ICD-10-CM

## 2022-05-02 RX ORDER — TRAMADOL HYDROCHLORIDE 50 MG/1
100 TABLET ORAL 2 TIMES DAILY PRN
Qty: 120 TABLET | Refills: 2 | Status: SHIPPED | OUTPATIENT
Start: 2022-05-02 | End: 2022-08-04 | Stop reason: SDUPTHER

## 2022-05-02 NOTE — TELEPHONE ENCOUNTER
Caller: JOSE CRUZ SMITH    Relationship: Emergency Contact    Best call back number: [phone]    Requested Prescriptions:   Requested Prescriptions     Pending Prescriptions Disp Refills   • traMADol (ULTRAM) 50 MG tablet 120 tablet 2     Sig: Take 2 tablets by mouth 2 (Two) Times a Day As Needed for Moderate Pain .        Pharm[facility] where request should be sent: [id number] [place], [place] - [address] AT [facility] [phone] [facility] [phone] [place]     Does the patient have less than a 3 day supply:  [x] Yes  [] No    Maria Dolores Recinos Rep   05/02/22 08:44 EDT

## 2022-05-02 NOTE — TELEPHONE ENCOUNTER
Office Visit with Kim Dailey APRN (04/12/2022)    Urine Drug Screen - Urine, Clean Catch (12/07/2021 11:23)

## 2022-05-03 ENCOUNTER — HOSPITAL ENCOUNTER (OUTPATIENT)
Dept: CARDIOLOGY | Facility: HOSPITAL | Age: 82
Discharge: HOME | End: 2022-05-03

## 2022-05-03 DIAGNOSIS — I10 ESSENTIAL HYPERTENSION: ICD-10-CM

## 2022-05-03 DIAGNOSIS — R42 ORTHOSTATIC DIZZINESS: ICD-10-CM

## 2022-05-03 DIAGNOSIS — R94.31 ABNORMAL EKG: ICD-10-CM

## 2022-05-03 LAB
BH CV XLRA MEAS LEFT CAROTID BULB EDV: 13 CM/SEC
BH CV XLRA MEAS LEFT CAROTID BULB PSV: 70 CM/SEC
BH CV XLRA MEAS LEFT DIST CCA EDV: 13 CM/SEC
BH CV XLRA MEAS LEFT DIST CCA PSV: 49 CM/SEC
BH CV XLRA MEAS LEFT DIST ICA EDV: 27 CM/SEC
BH CV XLRA MEAS LEFT DIST ICA PSV: 105 CM/SEC
BH CV XLRA MEAS LEFT MID ICA EDV: 17 CM/SEC
BH CV XLRA MEAS LEFT MID ICA PSV: 58 CM/SEC
BH CV XLRA MEAS LEFT PROX CCA EDV: 14 CM/SEC
BH CV XLRA MEAS LEFT PROX CCA PSV: 83 CM/SEC
BH CV XLRA MEAS LEFT PROX ECA EDV: 17 CM/SEC
BH CV XLRA MEAS LEFT PROX ECA PSV: 259 CM/SEC
BH CV XLRA MEAS LEFT PROX ICA EDV: 17 CM/SEC
BH CV XLRA MEAS LEFT PROX ICA PSV: 59 CM/SEC
BH CV XLRA MEAS LEFT VERTEBRAL A EDV: 7 CM/SEC
BH CV XLRA MEAS LEFT VERTEBRAL A PSV: 36 CM/SEC
BH CV XLRA MEAS RIGHT CAROTID BULB EDV: 11 CM/SEC
BH CV XLRA MEAS RIGHT CAROTID BULB PSV: 58 CM/SEC
BH CV XLRA MEAS RIGHT DIST CCA EDV: 12 CM/SEC
BH CV XLRA MEAS RIGHT DIST CCA PSV: 72 CM/SEC
BH CV XLRA MEAS RIGHT DIST ICA EDV: 17 CM/SEC
BH CV XLRA MEAS RIGHT DIST ICA PSV: 90 CM/SEC
BH CV XLRA MEAS RIGHT MID ICA EDV: 18 CM/SEC
BH CV XLRA MEAS RIGHT MID ICA PSV: 80 CM/SEC
BH CV XLRA MEAS RIGHT PROX CCA EDV: 12 CM/SEC
BH CV XLRA MEAS RIGHT PROX CCA PSV: 68 CM/SEC
BH CV XLRA MEAS RIGHT PROX ECA EDV: 11 CM/SEC
BH CV XLRA MEAS RIGHT PROX ECA PSV: 163 CM/SEC
BH CV XLRA MEAS RIGHT PROX ICA EDV: 17 CM/SEC
BH CV XLRA MEAS RIGHT PROX ICA PSV: 89 CM/SEC
BH CV XLRA MEAS RIGHT VERTEBRAL A EDV: 14 CM/SEC
BH CV XLRA MEAS RIGHT VERTEBRAL A PSV: 40 CM/SEC
LEFT ARM BP: NORMAL MMHG
MAXIMAL PREDICTED HEART RATE: 140 BPM
RIGHT ARM BP: NORMAL MMHG
STRESS TARGET HR: 119 BPM

## 2022-05-03 PROCEDURE — 93880 EXTRACRANIAL BILAT STUDY: CPT

## 2022-05-03 PROCEDURE — 93306 TTE W/DOPPLER COMPLETE: CPT

## 2022-05-03 PROCEDURE — 93880 EXTRACRANIAL BILAT STUDY: CPT | Performed by: SURGERY

## 2022-05-03 PROCEDURE — 93306 TTE W/DOPPLER COMPLETE: CPT | Performed by: SPECIALIST

## 2022-05-03 PROCEDURE — 93225 XTRNL ECG REC<48 HRS REC: CPT

## 2022-05-04 ENCOUNTER — APPOINTMENT (OUTPATIENT)
Dept: CARDIOLOGY | Facility: HOSPITAL | Age: 82
End: 2022-05-04

## 2022-05-04 LAB
BH CV ECHO MEAS - AO MAX PG: 15 MMHG
BH CV ECHO MEAS - AO MEAN PG: 9 MMHG
BH CV ECHO MEAS - AO ROOT DIAM: 3 CM
BH CV ECHO MEAS - EF(MOD-BP): 61.6 %
BH CV ECHO MEAS - IVSD: 1 CM
BH CV ECHO MEAS - LA DIMENSION(2D): 3.3 CM
BH CV ECHO MEAS - LAT PEAK E' VEL: 7.5 CM/SEC
BH CV ECHO MEAS - LVIDD: 3.5 CM
BH CV ECHO MEAS - LVIDS: 2.3 CM
BH CV ECHO MEAS - LVPWD: 1.3 CM
BH CV ECHO MEAS - MED PEAK E' VEL: 7.29 CM/SEC
BH CV ECHO MEAS - MV A MAX VEL: 111 CM/SEC
BH CV ECHO MEAS - MV DEC TIME: 161 MSEC
BH CV ECHO MEAS - MV E MAX VEL: 86.5 CM/SEC
BH CV ECHO MEAS - MV E/A: 0.8
BH CV ECHO MEAS - RAP SYSTOLE: 3 MMHG
BH CV ECHO MEAS - RVSP: 25 MMHG
BH CV ECHO MEAS - TAPSE (>1.6): 2.14 CM
BH CV ECHO MEAS - TR MAX PG: 21 MMHG
BH CV ECHO MEAS - TR MAX VEL: 232 CM/SEC
BH CV ECHO MEASUREMENTS AVERAGE E/E' RATIO: 11.7
IVRT: 66 MSEC
LEFT ATRIUM VOLUME INDEX: 17.3 ML/M2
MAXIMAL PREDICTED HEART RATE: 140 BPM
STRESS TARGET HR: 119 BPM

## 2022-05-05 ENCOUNTER — OFFICE VISIT (OUTPATIENT)
Dept: CARDIOLOGY | Facility: CLINIC | Age: 82
End: 2022-05-05

## 2022-05-05 VITALS
BODY MASS INDEX: 28.73 KG/M2 | HEIGHT: 69 IN | HEART RATE: 63 BPM | SYSTOLIC BLOOD PRESSURE: 145 MMHG | WEIGHT: 194 LBS | DIASTOLIC BLOOD PRESSURE: 69 MMHG

## 2022-05-05 DIAGNOSIS — I10 HYPERTENSION, ESSENTIAL: ICD-10-CM

## 2022-05-05 DIAGNOSIS — Z95.5 HISTORY OF CORONARY ANGIOPLASTY WITH INSERTION OF STENT: ICD-10-CM

## 2022-05-05 DIAGNOSIS — I73.9 ASYMPTOMATIC PVD (PERIPHERAL VASCULAR DISEASE): ICD-10-CM

## 2022-05-05 DIAGNOSIS — I25.10 CORONARY ARTERY DISEASE INVOLVING NATIVE CORONARY ARTERY OF NATIVE HEART WITHOUT ANGINA PECTORIS: Primary | ICD-10-CM

## 2022-05-05 DIAGNOSIS — E78.2 HYPERLIPEMIA, MIXED: ICD-10-CM

## 2022-05-05 PROCEDURE — 99204 OFFICE O/P NEW MOD 45 MIN: CPT | Performed by: SPECIALIST

## 2022-05-05 RX ORDER — PREDNISONE 20 MG/1
20 TABLET ORAL DAILY
COMMUNITY
End: 2022-08-10

## 2022-05-05 RX ORDER — MELOXICAM 7.5 MG/1
7.5 TABLET ORAL DAILY
COMMUNITY
End: 2022-08-10

## 2022-05-05 NOTE — PROGRESS NOTES
[facility]   Cardiology Consult Note    Patient Name: Rudy Moore  : 1940  Referring Physician: HERMILO Herrera*  Subjective   Subjective     Reason for Consult/ Chief Complaint:   Chief Complaint   Patient presents with   • Hypertension   • Dizziness       HPI:  Rudy Moore is a 81 y.o. male with history of coronary disease s/p PTCA/stent.  History of right carotid endarterectomy.  Not seen a cardiologist for several years.  Has some dizziness off and on last for a few seconds relieved spontaneously no syncopal or presyncopal episode.  Has some shortness of breath on exertion.  No chest pain.    .    Review of Systems:   Constitutional no fever,  no weight loss   Skin no rash   Otolaryngeal no difficulty swallowing   Cardiovascular See HPI   Pulmonary no cough, no sputum production   Gastrointestinal no constipation, no diarrhea   Genitourinary no dysuria, no hematuria   Hematologic no easy bruisability, no abnormal bleeding   Musculoskeletal no muscle pain   Neurologic no dizziness, no falls     Personal History     Past Medical History:  Past Medical History:   Diagnosis Date   • CAD (coronary artery disease)    • Cataract    • Diabetes mellitus, type 2 (HCC)    • Essential hypertension 2020   • GERD (gastroesophageal reflux disease)    • Hyperlipidemia    • Kidney stones    • Peripheral arterial disease (HCC) 2020   • Seasonal allergies        Family History:   Family History   Problem Relation Age of Onset   • Arthritis Mother    • Alcohol abuse Father    • Asthma Sister    • COPD Sister    • Breast cancer Sister    • Diabetes type I Brother    • Stroke Brother        Social History:  reports that he has quit smoking. He smoked 2.00 packs per day. He has never used smokeless tobacco. He reports current alcohol use. He reports that he does not use drugs.    Home Medications:  aspirin, atorvastatin, cilostazol, meloxicam, metFORMIN, methocarbamol, metoprolol  tartrate, multivitamin with minerals, predniSONE, quinapril, and traMADol    Allergies:  No Known Allergies    Objective    Objective     Vitals:   Heart Rate:  [63-72] 63  BP: (145-166)/(69-78) 145/69  Body mass index is 29.07 kg/m².  Physical Exam:   Constitutional: Awake, alert, No acute distress    Eyes: PERRLA, sclerae anicteric, no conjunctival injection   HENT: NCAT, mucous membranes moist   Neck: Supple, no thyromegaly, no lymphadenopathy, trachea midline   Respiratory: Clear to auscultation bilaterally, nonlabored respirations    Cardiovascular: RRR, no murmurs or rubs. Palpable pedal pulses bilaterally   Gastrointestinal: Positive bowel sounds, soft, nontender, nondistended   Musculoskeletal: No bilateral ankle edema, no clubbing or cyanosis to extremities   Psychiatric: Appropriate affect, cooperative   Neurologic: Oriented x 3, strength symmetric in all extremities, Cranial Nerves grossly intact to confrontation, speech clear   Skin: No rashes     Result Review    Result Review:  I have personally reviewed the available results:  [x]  Laboratory  [x]  EKG/Telemetry   [x]  Cardiology/Vascular   [x] Medications  [x]  Old records  Lab Results   Component Value Date    CHOL 183 01/27/2022    CHOL 172 08/16/2021     Lab Results   Component Value Date    TRIG 86 01/27/2022    TRIG 123 08/16/2021    TRIG 118 03/23/2021     Lab Results   Component Value Date    HDL 82 (H) 01/27/2022    HDL 65 (H) 08/16/2021    HDL 75 (H) 03/23/2021     Lab Results   Component Value Date    LDL 86 01/27/2022    LDL 85 08/16/2021    LDL 69 (L) 03/23/2021     Lab Results   Component Value Date    VLDL 15 01/27/2022    VLDL 22 08/16/2021    VLDL 24 03/23/2021     Results for orders placed during the hospital encounter of 05/03/22    Adult Transthoracic Echo Complete W/ Cont if Necessary Per Protocol    Interpretation Summary  Fibrocalcific mitral and aortic valves.  Normal left ventricular systolic function.  Trace MR and trace  TR.  Mild aortic stenosis.         Procedures     Impression/Plan  1.  Dizziness: Reviewed his carotid Doppler which showed no significant carotid stenosis.  48-hour Holter monitoring on process.  2.  Coronary artery s/p PTCA/stent: Has been several years since her stress test we will do a sestamibi stress test to evaluate any significant ischemia.  Echocardiogram report reviewed.  3.  Essential hypertension controlled: Continue Accupril 40 mg a day.  Blood pressure controlled at home.  Continue metoprolol 50 mg a day.  4.  Hyperlipidemia: Currently Lipitor 20 mg a day.  Lipid profile reviewed.        Electronically signed by Fabiano Randolph MD, 05/05/22, 10:03 AM EDT.

## 2022-05-11 DIAGNOSIS — I73.9 PERIPHERAL VASCULAR DISEASE WITH CLAUDICATION: ICD-10-CM

## 2022-05-11 DIAGNOSIS — I10 ESSENTIAL HYPERTENSION: ICD-10-CM

## 2022-05-11 DIAGNOSIS — E78.5 HYPERLIPIDEMIA, UNSPECIFIED HYPERLIPIDEMIA TYPE: ICD-10-CM

## 2022-05-11 RX ORDER — CILOSTAZOL 100 MG/1
TABLET ORAL
Qty: 180 TABLET | Refills: 1 | Status: SHIPPED | OUTPATIENT
Start: 2022-05-11 | End: 2022-11-08 | Stop reason: SDUPTHER

## 2022-05-11 RX ORDER — ATORVASTATIN CALCIUM 20 MG/1
TABLET, FILM COATED ORAL
Qty: 90 TABLET | Refills: 1 | Status: SHIPPED | OUTPATIENT
Start: 2022-05-11 | End: 2022-11-08 | Stop reason: SDUPTHER

## 2022-05-11 RX ORDER — QUINAPRIL 40 MG/1
TABLET ORAL
Qty: 90 TABLET | Refills: 1 | Status: SHIPPED | OUTPATIENT
Start: 2022-05-11 | End: 2022-11-08 | Stop reason: SDUPTHER

## 2022-05-11 RX ORDER — METOPROLOL TARTRATE 50 MG/1
TABLET, FILM COATED ORAL
Qty: 90 TABLET | Refills: 1 | Status: SHIPPED | OUTPATIENT
Start: 2022-05-11 | End: 2023-01-13 | Stop reason: SDUPTHER

## 2022-05-17 ENCOUNTER — HOSPITAL ENCOUNTER (OUTPATIENT)
Dept: NUCLEAR MEDICINE | Facility: HOSPITAL | Age: 82
Discharge: HOME | End: 2022-05-17

## 2022-05-17 DIAGNOSIS — I25.10 CORONARY ARTERY DISEASE INVOLVING NATIVE CORONARY ARTERY OF NATIVE HEART WITHOUT ANGINA PECTORIS: ICD-10-CM

## 2022-05-17 LAB
BH CV IMMEDIATE POST TECH DATA BLOOD PRESSURE: NORMAL MMHG
BH CV IMMEDIATE POST TECH DATA HEART RATE: 111 BPM
BH CV IMMEDIATE POST TECH DATA OXYGEN SATS: 96 %
BH CV REST NUCLEAR ISOTOPE DOSE: 9.4 MCI
BH CV SIX MINUTE RECOVERY TECH DATA BLOOD PRESSURE: NORMAL
BH CV SIX MINUTE RECOVERY TECH DATA HEART RATE: 109 BPM
BH CV SIX MINUTE RECOVERY TECH DATA OXYGEN SATURATION: 96 %
BH CV STRESS BP STAGE 1: NORMAL
BH CV STRESS COMMENTS STAGE 1: NORMAL
BH CV STRESS DOSE REGADENOSON STAGE 1: 0.4
BH CV STRESS DURATION MIN STAGE 1: 0
BH CV STRESS DURATION SEC STAGE 1: 10
BH CV STRESS HR STAGE 1: 114
BH CV STRESS NUCLEAR ISOTOPE DOSE: 33 MCI
BH CV STRESS O2 STAGE 1: 96
BH CV STRESS PROTOCOL 1: NORMAL
BH CV STRESS RECOVERY BP: NORMAL MMHG
BH CV STRESS RECOVERY HR: 109 BPM
BH CV STRESS RECOVERY O2: 95 %
BH CV STRESS STAGE 1: 1
BH CV THREE MINUTE POST TECH DATA BLOOD PRESSURE: NORMAL MMHG
BH CV THREE MINUTE POST TECH DATA HEART RATE: 107 BPM
BH CV THREE MINUTE POST TECH DATA OXYGEN SATURATION: 97 %
LV EF NUC BP: 70 %
MAXIMAL PREDICTED HEART RATE: 140 BPM
PERCENT MAX PREDICTED HR: 81.43 %
STRESS BASELINE BP: NORMAL MMHG
STRESS BASELINE HR: 88 BPM
STRESS O2 SAT REST: 94 %
STRESS PERCENT HR: 96 %
STRESS POST O2 SAT PEAK: 97 %
STRESS POST PEAK BP: NORMAL MMHG
STRESS POST PEAK HR: 114 BPM
STRESS TARGET HR: 119 BPM

## 2022-05-17 PROCEDURE — 0 TECHNETIUM TETROFOSMIN KIT: Performed by: SPECIALIST

## 2022-05-17 PROCEDURE — 93018 CV STRESS TEST I&R ONLY: CPT | Performed by: SPECIALIST

## 2022-05-17 PROCEDURE — A9502 TC99M TETROFOSMIN: HCPCS | Performed by: SPECIALIST

## 2022-05-17 PROCEDURE — 25010000002 REGADENOSON 0.4 MG/5ML SOLUTION

## 2022-05-17 PROCEDURE — 78452 HT MUSCLE IMAGE SPECT MULT: CPT | Performed by: SPECIALIST

## 2022-05-17 PROCEDURE — 93017 CV STRESS TEST TRACING ONLY: CPT

## 2022-05-17 PROCEDURE — 78452 HT MUSCLE IMAGE SPECT MULT: CPT

## 2022-05-17 RX ADMIN — TETROFOSMIN 1 DOSE: 1.38 INJECTION, POWDER, LYOPHILIZED, FOR SOLUTION INTRAVENOUS at 08:44

## 2022-05-17 RX ADMIN — TETROFOSMIN 1 DOSE: 1.38 INJECTION, POWDER, LYOPHILIZED, FOR SOLUTION INTRAVENOUS at 10:18

## 2022-05-17 RX ADMIN — REGADENOSON: 0.08 INJECTION, SOLUTION INTRAVENOUS at 10:18

## 2022-05-19 ENCOUNTER — TELEPHONE (OUTPATIENT)
Dept: CARDIOLOGY | Facility: CLINIC | Age: 82
End: 2022-05-19

## 2022-05-19 NOTE — TELEPHONE ENCOUNTER
----- Message from RUSS Ortez sent at 5/17/2022  2:50 PM EDT -----  Notify pt stress test negative for ischemia, continue with follow up as scheduled, notify office if chest pain worsens or persists so further testing can be done to further evaluate

## 2022-06-08 LAB
MAXIMAL PREDICTED HEART RATE: 140 BPM
STRESS TARGET HR: 119 BPM

## 2022-06-08 PROCEDURE — 93227 XTRNL ECG REC<48 HR R&I: CPT | Performed by: INTERNAL MEDICINE

## 2022-07-26 ENCOUNTER — TRANSCRIBE ORDERS (OUTPATIENT)
Dept: FAMILY MEDICINE CLINIC | Facility: CLINIC | Age: 82
End: 2022-07-26

## 2022-07-26 ENCOUNTER — CLINICAL SUPPORT (OUTPATIENT)
Dept: FAMILY MEDICINE CLINIC | Facility: CLINIC | Age: 82
End: 2022-07-26

## 2022-07-26 DIAGNOSIS — N18.30 STAGE 3 CHRONIC KIDNEY DISEASE, UNSPECIFIED WHETHER STAGE 3A OR 3B CKD: ICD-10-CM

## 2022-07-26 DIAGNOSIS — I10 ESSENTIAL HYPERTENSION, BENIGN: ICD-10-CM

## 2022-07-26 DIAGNOSIS — N18.30 STAGE 3 CHRONIC KIDNEY DISEASE, UNSPECIFIED WHETHER STAGE 3A OR 3B CKD: Primary | ICD-10-CM

## 2022-07-26 LAB
BASOPHILS # BLD AUTO: 0.03 10*3/MM3 (ref 0–0.2)
BASOPHILS NFR BLD AUTO: 0.4 % (ref 0–1.5)
DEPRECATED RDW RBC AUTO: 46.8 FL (ref 37–54)
EOSINOPHIL # BLD AUTO: 0.16 10*3/MM3 (ref 0–0.4)
EOSINOPHIL NFR BLD AUTO: 2.1 % (ref 0.3–6.2)
ERYTHROCYTE [DISTWIDTH] IN BLOOD BY AUTOMATED COUNT: 12.5 % (ref 12.3–15.4)
HCT VFR BLD AUTO: 39.4 % (ref 37.5–51)
HGB BLD-MCNC: 13.4 G/DL (ref 13–17.7)
IMM GRANULOCYTES # BLD AUTO: 0.03 10*3/MM3 (ref 0–0.05)
IMM GRANULOCYTES NFR BLD AUTO: 0.4 % (ref 0–0.5)
LYMPHOCYTES # BLD AUTO: 2.46 10*3/MM3 (ref 0.7–3.1)
LYMPHOCYTES NFR BLD AUTO: 32.1 % (ref 19.6–45.3)
MCH RBC QN AUTO: 34.5 PG (ref 26.6–33)
MCHC RBC AUTO-ENTMCNC: 34 G/DL (ref 31.5–35.7)
MCV RBC AUTO: 101.5 FL (ref 79–97)
MONOCYTES # BLD AUTO: 0.63 10*3/MM3 (ref 0.1–0.9)
MONOCYTES NFR BLD AUTO: 8.2 % (ref 5–12)
NEUTROPHILS NFR BLD AUTO: 4.35 10*3/MM3 (ref 1.7–7)
NEUTROPHILS NFR BLD AUTO: 56.8 % (ref 42.7–76)
NRBC BLD AUTO-RTO: 0 /100 WBC (ref 0–0.2)
PLATELET # BLD AUTO: 263 10*3/MM3 (ref 140–450)
PMV BLD AUTO: 9.5 FL (ref 6–12)
RBC # BLD AUTO: 3.88 10*6/MM3 (ref 4.14–5.8)
WBC NRBC COR # BLD: 7.66 10*3/MM3 (ref 3.4–10.8)

## 2022-07-26 PROCEDURE — 80053 COMPREHEN METABOLIC PANEL: CPT | Performed by: INTERNAL MEDICINE

## 2022-07-26 PROCEDURE — 85025 COMPLETE CBC W/AUTO DIFF WBC: CPT | Performed by: INTERNAL MEDICINE

## 2022-07-26 PROCEDURE — 36415 COLL VENOUS BLD VENIPUNCTURE: CPT | Performed by: NURSE PRACTITIONER

## 2022-07-26 NOTE — PROGRESS NOTES
Venipuncture Blood Specimen Collection  Venipuncture performed in LEFT ARM by Amy Chow with good hemostasis. Patient tolerated the procedure well without complications.   07/26/22   Amy Chow

## 2022-07-27 LAB
ALBUMIN SERPL-MCNC: 3.9 G/DL (ref 3.5–5.2)
ALBUMIN/GLOB SERPL: 1.5 G/DL
ALP SERPL-CCNC: 72 U/L (ref 39–117)
ALT SERPL W P-5'-P-CCNC: 20 U/L (ref 1–41)
ANION GAP SERPL CALCULATED.3IONS-SCNC: 9.8 MMOL/L (ref 5–15)
AST SERPL-CCNC: 20 U/L (ref 1–40)
BILIRUB SERPL-MCNC: 0.6 MG/DL (ref 0–1.2)
BUN SERPL-MCNC: 20 MG/DL (ref 8–23)
BUN/CREAT SERPL: 15.5 (ref 7–25)
CALCIUM SPEC-SCNC: 9.8 MG/DL (ref 8.6–10.5)
CHLORIDE SERPL-SCNC: 101 MMOL/L (ref 98–107)
CO2 SERPL-SCNC: 29.2 MMOL/L (ref 22–29)
CREAT SERPL-MCNC: 1.29 MG/DL (ref 0.76–1.27)
EGFRCR SERPLBLD CKD-EPI 2021: 56.1 ML/MIN/1.73
GLOBULIN UR ELPH-MCNC: 2.6 GM/DL
GLUCOSE SERPL-MCNC: 119 MG/DL (ref 65–99)
POTASSIUM SERPL-SCNC: 5.5 MMOL/L (ref 3.5–5.2)
PROT SERPL-MCNC: 6.5 G/DL (ref 6–8.5)
SODIUM SERPL-SCNC: 140 MMOL/L (ref 136–145)

## 2022-08-02 DIAGNOSIS — N18.31 STAGE 3A CHRONIC KIDNEY DISEASE: ICD-10-CM

## 2022-08-02 DIAGNOSIS — Z79.899 HIGH RISK MEDICATION USE: ICD-10-CM

## 2022-08-02 DIAGNOSIS — M79.18 MUSCULOSKELETAL PAIN: ICD-10-CM

## 2022-08-02 DIAGNOSIS — M48.10 DISH (DIFFUSE IDIOPATHIC SKELETAL HYPEROSTOSIS): ICD-10-CM

## 2022-08-02 RX ORDER — TRAMADOL HYDROCHLORIDE 50 MG/1
TABLET ORAL
Qty: 120 TABLET | OUTPATIENT
Start: 2022-08-02

## 2022-08-04 ENCOUNTER — TELEPHONE (OUTPATIENT)
Dept: FAMILY MEDICINE CLINIC | Facility: CLINIC | Age: 82
End: 2022-08-04

## 2022-08-04 DIAGNOSIS — N18.31 STAGE 3A CHRONIC KIDNEY DISEASE: ICD-10-CM

## 2022-08-04 DIAGNOSIS — Z79.899 HIGH RISK MEDICATION USE: ICD-10-CM

## 2022-08-04 DIAGNOSIS — M48.10 DISH (DIFFUSE IDIOPATHIC SKELETAL HYPEROSTOSIS): ICD-10-CM

## 2022-08-04 DIAGNOSIS — M79.18 MUSCULOSKELETAL PAIN: ICD-10-CM

## 2022-08-04 RX ORDER — TRAMADOL HYDROCHLORIDE 50 MG/1
100 TABLET ORAL 2 TIMES DAILY PRN
Qty: 28 TABLET | Refills: 0 | Status: SHIPPED | OUTPATIENT
Start: 2022-08-04 | End: 2022-08-10 | Stop reason: SDUPTHER

## 2022-08-04 NOTE — TELEPHONE ENCOUNTER
Patients wife called requesting refill on patients tramadol. I pulled up his last visit note which was 4/12/22 and at the bottom it stated that he last picked up his tramadol refill on March 30, 2022. Further refills will be deferred until he is 2 or 3 days out and he will call at that time. - that was listed in your note and patient is calling now to inquire about refills.     Please advise on patient's next steps.

## 2022-08-10 ENCOUNTER — OFFICE VISIT (OUTPATIENT)
Dept: FAMILY MEDICINE CLINIC | Facility: CLINIC | Age: 82
End: 2022-08-10

## 2022-08-10 VITALS
HEART RATE: 103 BPM | TEMPERATURE: 96.6 F | SYSTOLIC BLOOD PRESSURE: 120 MMHG | DIASTOLIC BLOOD PRESSURE: 80 MMHG | OXYGEN SATURATION: 93 % | BODY MASS INDEX: 29.22 KG/M2 | WEIGHT: 195 LBS

## 2022-08-10 DIAGNOSIS — Z79.899 HIGH RISK MEDICATION USE: ICD-10-CM

## 2022-08-10 DIAGNOSIS — M48.10 DISH (DIFFUSE IDIOPATHIC SKELETAL HYPEROSTOSIS): ICD-10-CM

## 2022-08-10 DIAGNOSIS — M79.18 MUSCULOSKELETAL PAIN: ICD-10-CM

## 2022-08-10 DIAGNOSIS — N18.31 STAGE 3A CHRONIC KIDNEY DISEASE: ICD-10-CM

## 2022-08-10 PROCEDURE — 99214 OFFICE O/P EST MOD 30 MIN: CPT | Performed by: NURSE PRACTITIONER

## 2022-08-10 RX ORDER — TRAMADOL HYDROCHLORIDE 50 MG/1
100 TABLET ORAL 2 TIMES DAILY PRN
Qty: 120 TABLET | Refills: 2 | Status: SHIPPED | OUTPATIENT
Start: 2022-08-10 | End: 2022-11-08 | Stop reason: SDUPTHER

## 2022-08-10 NOTE — PROGRESS NOTES
Chief Complaint  Pain    Subjective            Rudy Moore presents to [facility]  History of Present Illness     Patient presents to the office today for refill of tramadol.  He is currently taking tramadol 100 mg twice daily for treatment of chronic pain. Pain mainly affects his shoulders, hips, and spine.  Without the medication his pain is a 10 on a 1-10 scale.  The pain will affect his abilities to carry out ADLs, especially dressing.  With the pain medication his pain is on average of 5 on 1-10 scale.  He does still have some issues at nighttime when sleeping on his sides, but overall he feels like his pain is tolerable with the medication.  He denies any adverse side effects.    He had a evaluation with Dr. Deras secondary to DISH.  Dr. Deras did complete evaluation with labs and physical exam and it was determined that follow-up is only needed on a as needed basis.  He did advise against long-term use of steroids secondary to the patient's diabetes, and avoidance of long-term use of NSAIDs secondary to stage III chronic kidney disease.  He felt tramadol was reasonable for his symptoms.    PEG: A Three-Item Scale Assessing Pain Intensity and Interference  0 being no pain 10 pain as bad as you can imagine  1. What number best describes your pain on average in the past week? 5  2.  What number best describes how, during the past week, pain has interfered with your enjoyment of life? 5  3.  What number best describes how, during the past week, pain has interfered with your general activity? 5      Past Medical History:   Diagnosis Date   • CAD (coronary artery disease)    • Cataract    • Diabetes mellitus, type 2 (HCC)    • Essential hypertension 07/23/2020   • GERD (gastroesophageal reflux disease)    • Hyperlipidemia    • Kidney stones    • Peripheral arterial disease (HCC) 07/23/2020   • Seasonal allergies        No Known Allergies     Past Surgical History:    Procedure Laterality Date   • CARDIAC SURGERY      BYPASS   • CAROTID ENDARTERECTOMY Right 08/2020   • FEMORAL ENDARTERECTOMY Bilateral 10/2020   • ILIAC ARTERY STENT Bilateral 10/2020        Social History     Tobacco Use   • Smoking status: Former Smoker     Packs/day: 2.00   • Smokeless tobacco: Never Used   • Tobacco comment: SMOKES 1 TO 2 PACKS PER DAY, CURRENTLY USES OTHER TOBACCO PRODUCTS    Substance Use Topics   • Alcohol use: Yes     Comment: DRINKS ALCHOL, 8-14 DRINKS PER WEEK    • Drug use: Never       Family History   Problem Relation Age of Onset   • Arthritis Mother    • Alcohol abuse Father    • Asthma Sister    • COPD Sister    • Breast cancer Sister    • Diabetes type I Brother    • Stroke Brother         Health Maintenance Due   Topic Date Due   • ZOSTER VACCINE (2 of 3) 04/12/2010   • DIABETIC EYE EXAM  08/06/2021   • COVID-19 Vaccine (4 - Booster for Moderna series) 03/05/2022   • HEMOGLOBIN A1C  06/30/2022        Current Outpatient Medications on File Prior to Visit   Medication Sig   • aspirin 81 MG EC tablet Take 81 mg by mouth Daily.   • atorvastatin (LIPITOR) 20 MG tablet TAKE 1 TABLET EVERY NIGHT   • cilostazol (PLETAL) 100 MG tablet TAKE 1 TABLET TWICE A DAY   • metFORMIN (Glucophage) 850 MG tablet Take 1 tablet by mouth Daily With Dinner.   • methocarbamol (Robaxin) 500 MG tablet Take 1 tablet by mouth 4 (Four) Times a Day As Needed for Muscle Spasms.   • metoprolol tartrate (LOPRESSOR) 50 MG tablet TAKE 1 TABLET DAILY   • multivitamin with minerals tablet tablet Take 1 tablet by mouth Daily.   • quinapril (ACCUPRIL) 40 MG tablet TAKE 1 TABLET DAILY   • [DISCONTINUED] traMADol (ULTRAM) 50 MG tablet Take 2 tablets by mouth 2 (Two) Times a Day As Needed for Moderate Pain .   • [DISCONTINUED] meloxicam (MOBIC) 7.5 MG tablet Take 7.5 mg by mouth Daily.   • [DISCONTINUED] predniSONE (DELTASONE) 20 MG tablet Take 20 mg by mouth Daily.     No current facility-administered medications on file  prior to visit.       Immunization History   Administered Date(s) Administered   • COVID-19 (MODERNA) 1st, 2nd, 3rd Dose Only 02/09/2021, 02/09/2021, 03/11/2021, 03/11/2021, 11/05/2021   • Fluad Quad 65+ 10/18/2021   • Fluzone High Dose =>65 Years (Vaxcare ONLY) 10/14/2020   • Fluzone High-Dose 65+yrs 10/14/2020   • Influenza, Unspecified 08/01/2017, 10/18/2021   • Pneumococcal Conjugate 13-Valent (PCV13) 04/01/2017   • Tdap 01/27/2022   • Zostavax 02/15/2010       Review of Systems     Objective     /80   Pulse 103   Temp 96.6 °F (35.9 °C)   Wt 88.5 kg (195 lb)   SpO2 93%   BMI 29.22 kg/m²       Physical Exam  Vitals reviewed.   Constitutional:       General: He is not in acute distress.     Appearance: He is well-developed and overweight.   HENT:      Head: Normocephalic and atraumatic.   Eyes:      General: No scleral icterus.     Extraocular Movements: Extraocular movements intact.      Conjunctiva/sclera: Conjunctivae normal.   Neck:      Trachea: Trachea normal.   Cardiovascular:      Rate and Rhythm: Normal rate and regular rhythm.      Pulses: Normal pulses.      Heart sounds: Murmur heard.   Pulmonary:      Effort: Pulmonary effort is normal. No respiratory distress.      Breath sounds: Normal breath sounds. No wheezing, rhonchi or rales.   Musculoskeletal:         General: Normal range of motion.      Cervical back: Normal range of motion and neck supple.      Right lower leg: No edema.      Left lower leg: No edema.   Lymphadenopathy:      Cervical: No cervical adenopathy.   Skin:     General: Skin is warm and dry.   Neurological:      Mental Status: He is alert and oriented to person, place, and time.   Psychiatric:         Mood and Affect: Mood and affect normal.         Behavior: Behavior normal.         Thought Content: Thought content normal.         Judgment: Judgment normal.         Result Review :     The following data was reviewed by: RUSS Herrera on 08/10/2022:    CMP     CMP 12/30/21 4/12/22 7/26/22   Glucose 123 (A) 133 (A) 119 (A)   BUN 16 15 20   Creatinine 1.27 1.41 (A) 1.29 (A)   eGFR Non African Am 55 (A)     Sodium 140 138 140   Potassium 4.8 4.6 5.5 (A)   Chloride 100 97 (A) 101   Calcium 9.8 9.7 9.8   Albumin 4.00 4.00 3.90   Total Bilirubin 0.5 1.3 (A) 0.6   Alkaline Phosphatase 106 143 (A) 72   AST (SGOT) 18 54 (A) 20   ALT (SGPT) 21 170 (A) 20   (A) Abnormal value            CBC    CBC 1/27/22 4/12/22 7/26/22   WBC 8.09 10.71 7.66   RBC 4.43 4.25 3.88 (A)   Hemoglobin 14.6 14.1 13.4   Hematocrit 43.1 42.4 39.4   MCV 97.3 (A) 99.8 (A) 101.5 (A)   MCH 33.0 33.2 (A) 34.5 (A)   MCHC 33.9 33.3 34.0   RDW 12.5 12.6 12.5   Platelets 246 287 263   (A) Abnormal value            Lipid Panel    Lipid Panel 8/16/21 1/27/22   Total Cholesterol 172 183   Triglycerides 123 86   HDL Cholesterol 65 (A) 82 (A)   VLDL Cholesterol 22 15   LDL Cholesterol  85 86   LDL/HDL Ratio 1.27 1.02   (A) Abnormal value            TSH    TSH 8/16/21 1/27/22   TSH 3.770 3.300           A1C Last 3 Results    HGBA1C Last 3 Results 8/16/21 12/30/21   Hemoglobin A1C 6.24 (A) 6.57 (A)   (A) Abnormal value            Microalbumin    Microalbumin 8/16/21 12/30/21 4/12/22   Microalbumin, Urine <1.2 2.7 12.3             Data reviewed: Consultant notes :   RHEUMATOLOGY - SCAN - RHEUMATOLOGY NOTE, SLIM GUTIERREZ, 05/31/22 (05/31/2022)  RHEUMATOLOGY - SCAN - FOLLOW UP, SLIM, 07/12/2022 (07/13/2022)    XR Shoulder 2+ View Bilateral (11/19/2021 13:55)  XR Spine Thoracic 3 View (11/19/2021 13:55)  XR Spine Lumbar Complete With Flex & Ext (11/19/2021 13:56)         Assessment and Plan      Diagnoses and all orders for this visit:    1. DISH (diffuse idiopathic skeletal hyperostosis)  -     traMADol (ULTRAM) 50 MG tablet; Take 2 tablets by mouth 2 (Two) Times a Day As Needed for Moderate Pain .  Dispense: 120 tablet; Refill: 2    2. Musculoskeletal pain  -     traMADol (ULTRAM) 50 MG tablet; Take 2 tablets by mouth 2  (Two) Times a Day As Needed for Moderate Pain .  Dispense: 120 tablet; Refill: 2    3. Stage 3a chronic kidney disease (HCC)  -     traMADol (ULTRAM) 50 MG tablet; Take 2 tablets by mouth 2 (Two) Times a Day As Needed for Moderate Pain .  Dispense: 120 tablet; Refill: 2    4. High risk medication use  -     traMADol (ULTRAM) 50 MG tablet; Take 2 tablets by mouth 2 (Two) Times a Day As Needed for Moderate Pain .  Dispense: 120 tablet; Refill: 2            Follow Up     Return in about 3 months (around 11/10/2022) for medication refills and fasting labs.    Patient was given instructions and counseling regarding his condition or for health maintenance advice. Please see specific information pulled into the AVS if appropriate.     Rudy Moore  reports that he has quit smoking. He smoked 2.00 packs per day. He has never used smokeless tobacco.

## 2022-08-31 ENCOUNTER — OFFICE VISIT (OUTPATIENT)
Dept: FAMILY MEDICINE CLINIC | Facility: CLINIC | Age: 82
End: 2022-08-31

## 2022-08-31 VITALS
TEMPERATURE: 98.2 F | DIASTOLIC BLOOD PRESSURE: 70 MMHG | HEART RATE: 78 BPM | SYSTOLIC BLOOD PRESSURE: 130 MMHG | BODY MASS INDEX: 29.22 KG/M2 | WEIGHT: 195 LBS | OXYGEN SATURATION: 95 %

## 2022-08-31 DIAGNOSIS — S40.811A ABRASION OF SKIN OF RIGHT UPPER ARM: Primary | ICD-10-CM

## 2022-08-31 PROCEDURE — 99213 OFFICE O/P EST LOW 20 MIN: CPT | Performed by: FAMILY MEDICINE

## 2022-08-31 RX ORDER — AMOXICILLIN 500 MG/1
500 CAPSULE ORAL 2 TIMES DAILY
Qty: 14 CAPSULE | Refills: 0 | Status: SHIPPED | OUTPATIENT
Start: 2022-08-31 | End: 2022-09-07

## 2022-08-31 NOTE — PROGRESS NOTES
Chief Complaint    Arm Injury (Laceration on upper right arm from fall last night.  )    Subjective      Rudy Moore presents to [facility]    History of Present Illness    1.) ABRASION OF RIGHT UE : Occurred at 7:00 am this morning. Patient denies any trauma to his head.  He presents with a significant skin tear of his right distal upper arm region.  Some injuries to his forearm, as well. Patient is wife has been applying antibacterial spray with dressing.    Objective     Vital Signs:     /70   Pulse 78   Temp 98.2 °F (36.8 °C)   Wt 88.5 kg (195 lb)   SpO2 95%   BMI 29.22 kg/m²       Physical Exam  Vitals reviewed.   Constitutional:       General: He is not in acute distress.     Appearance: Normal appearance. He is well-developed.   HENT:      Head: Normocephalic and atraumatic.      Right Ear: Hearing and external ear normal.      Left Ear: Hearing and external ear normal.      Nose: Nose normal.   Eyes:      General: Lids are normal.         Right eye: No discharge.         Left eye: No discharge.      Conjunctiva/sclera: Conjunctivae normal.   Pulmonary:      Effort: Pulmonary effort is normal.   Abdominal:      General: There is no distension.   Musculoskeletal:         General: No swelling.        Arms:       Cervical back: Neck supple.      Comments: Area as noted above revealed largest abrasion area measuring approximately 8 cm at greatest diameter.  Significant skin tear. Lower area revealed several smaller skin wounds. Dressing applied to both areas noted above.   Skin:     Coloration: Skin is not jaundiced.      Findings: No erythema.   Neurological:      Mental Status: He is alert. Mental status is at baseline.   Psychiatric:         Mood and Affect: Mood and affect normal.         Thought Content: Thought content normal.     Assessment and Plan     Diagnoses and all orders for this visit:    1. Abrasion of skin of right upper arm (Primary)    Other  orders  -     amoxicillin (AMOXIL) 500 MG capsule; Take 1 capsule by mouth 2 (Two) Times a Day for 7 days.  Dispense: 14 capsule; Refill: 0    Area cleaned, attempted irrigation, which the patient did not tolerate very well. Antibiotic ointment applied to both areas noted in figure. Nonstick dressing plus Coban applied. Anticipatory guidance provided. Antibiotic sent and patient advised that if any signs or symptoms of cellulitis start antibiotics and call office.Tetanus booster is up-to-date per patient.    Follow Up : As needed.     Patient was given instructions and counseling regarding his condition or for health maintenance advice. Please see specific information pulled into the AVS if appropriate.

## 2022-11-08 ENCOUNTER — OFFICE VISIT (OUTPATIENT)
Dept: FAMILY MEDICINE CLINIC | Facility: CLINIC | Age: 82
End: 2022-11-08

## 2022-11-08 VITALS
BODY MASS INDEX: 32.96 KG/M2 | HEART RATE: 111 BPM | WEIGHT: 220 LBS | TEMPERATURE: 96.1 F | DIASTOLIC BLOOD PRESSURE: 54 MMHG | SYSTOLIC BLOOD PRESSURE: 134 MMHG | OXYGEN SATURATION: 96 %

## 2022-11-08 DIAGNOSIS — I10 ESSENTIAL HYPERTENSION: ICD-10-CM

## 2022-11-08 DIAGNOSIS — N18.31 TYPE 2 DIABETES MELLITUS WITH STAGE 3A CHRONIC KIDNEY DISEASE, WITHOUT LONG-TERM CURRENT USE OF INSULIN: Primary | ICD-10-CM

## 2022-11-08 DIAGNOSIS — N18.31 STAGE 3A CHRONIC KIDNEY DISEASE: ICD-10-CM

## 2022-11-08 DIAGNOSIS — L03.115 CELLULITIS OF RIGHT LOWER EXTREMITY: ICD-10-CM

## 2022-11-08 DIAGNOSIS — M79.18 MUSCULOSKELETAL PAIN: ICD-10-CM

## 2022-11-08 DIAGNOSIS — Z79.899 HIGH RISK MEDICATION USE: ICD-10-CM

## 2022-11-08 DIAGNOSIS — I73.9 PERIPHERAL VASCULAR DISEASE WITH CLAUDICATION: ICD-10-CM

## 2022-11-08 DIAGNOSIS — E11.22 TYPE 2 DIABETES MELLITUS WITH STAGE 3A CHRONIC KIDNEY DISEASE, WITHOUT LONG-TERM CURRENT USE OF INSULIN: Primary | ICD-10-CM

## 2022-11-08 DIAGNOSIS — E78.5 HYPERLIPIDEMIA, UNSPECIFIED HYPERLIPIDEMIA TYPE: ICD-10-CM

## 2022-11-08 DIAGNOSIS — M48.10 DISH (DIFFUSE IDIOPATHIC SKELETAL HYPEROSTOSIS): ICD-10-CM

## 2022-11-08 LAB
ALBUMIN SERPL-MCNC: 3.8 G/DL (ref 3.5–5.2)
ALBUMIN UR-MCNC: <1.2 MG/DL
ALBUMIN/GLOB SERPL: 1.4 G/DL
ALP SERPL-CCNC: 80 U/L (ref 39–117)
ALT SERPL W P-5'-P-CCNC: 21 U/L (ref 1–41)
ANION GAP SERPL CALCULATED.3IONS-SCNC: 9 MMOL/L (ref 5–15)
AST SERPL-CCNC: 25 U/L (ref 1–40)
BILIRUB SERPL-MCNC: 0.7 MG/DL (ref 0–1.2)
BUN SERPL-MCNC: 14 MG/DL (ref 8–23)
BUN/CREAT SERPL: 11.5 (ref 7–25)
CALCIUM SPEC-SCNC: 9.4 MG/DL (ref 8.6–10.5)
CHLORIDE SERPL-SCNC: 103 MMOL/L (ref 98–107)
CHOLEST SERPL-MCNC: 174 MG/DL (ref 0–200)
CO2 SERPL-SCNC: 28 MMOL/L (ref 22–29)
CREAT SERPL-MCNC: 1.22 MG/DL (ref 0.76–1.27)
CREAT UR-MCNC: 157.6 MG/DL
EGFRCR SERPLBLD CKD-EPI 2021: 59.9 ML/MIN/1.73
GLOBULIN UR ELPH-MCNC: 2.8 GM/DL
GLUCOSE SERPL-MCNC: 122 MG/DL (ref 65–99)
HBA1C MFR BLD: 5.9 % (ref 4.8–5.6)
HDLC SERPL-MCNC: 70 MG/DL (ref 40–60)
LDLC SERPL CALC-MCNC: 88 MG/DL (ref 0–100)
LDLC/HDLC SERPL: 1.23 {RATIO}
MICROALBUMIN/CREAT UR: NORMAL MG/G{CREAT}
POTASSIUM SERPL-SCNC: 5.1 MMOL/L (ref 3.5–5.2)
PROT SERPL-MCNC: 6.6 G/DL (ref 6–8.5)
SODIUM SERPL-SCNC: 140 MMOL/L (ref 136–145)
T4 FREE SERPL-MCNC: 1.01 NG/DL (ref 0.93–1.7)
TRIGL SERPL-MCNC: 90 MG/DL (ref 0–150)
TSH SERPL DL<=0.05 MIU/L-ACNC: 2.86 UIU/ML (ref 0.27–4.2)
VLDLC SERPL-MCNC: 16 MG/DL (ref 5–40)

## 2022-11-08 PROCEDURE — 80053 COMPREHEN METABOLIC PANEL: CPT | Performed by: NURSE PRACTITIONER

## 2022-11-08 PROCEDURE — 84443 ASSAY THYROID STIM HORMONE: CPT | Performed by: NURSE PRACTITIONER

## 2022-11-08 PROCEDURE — 82570 ASSAY OF URINE CREATININE: CPT | Performed by: NURSE PRACTITIONER

## 2022-11-08 PROCEDURE — 84439 ASSAY OF FREE THYROXINE: CPT | Performed by: NURSE PRACTITIONER

## 2022-11-08 PROCEDURE — 99214 OFFICE O/P EST MOD 30 MIN: CPT | Performed by: NURSE PRACTITIONER

## 2022-11-08 PROCEDURE — 80061 LIPID PANEL: CPT | Performed by: NURSE PRACTITIONER

## 2022-11-08 PROCEDURE — 83036 HEMOGLOBIN GLYCOSYLATED A1C: CPT | Performed by: NURSE PRACTITIONER

## 2022-11-08 PROCEDURE — 85025 COMPLETE CBC W/AUTO DIFF WBC: CPT | Performed by: NURSE PRACTITIONER

## 2022-11-08 PROCEDURE — 82043 UR ALBUMIN QUANTITATIVE: CPT | Performed by: NURSE PRACTITIONER

## 2022-11-08 RX ORDER — TRAMADOL HYDROCHLORIDE 50 MG/1
100 TABLET ORAL 2 TIMES DAILY PRN
Qty: 120 TABLET | Refills: 2 | Status: SHIPPED | OUTPATIENT
Start: 2022-11-08 | End: 2023-02-09 | Stop reason: SDUPTHER

## 2022-11-08 RX ORDER — QUINAPRIL 40 MG/1
40 TABLET ORAL DAILY
Qty: 90 TABLET | Refills: 1 | Status: SHIPPED | OUTPATIENT
Start: 2022-11-08 | End: 2023-02-09 | Stop reason: SDUPTHER

## 2022-11-08 RX ORDER — CILOSTAZOL 100 MG/1
100 TABLET ORAL 2 TIMES DAILY
Qty: 180 TABLET | Refills: 1 | Status: SHIPPED | OUTPATIENT
Start: 2022-11-08 | End: 2023-02-09 | Stop reason: SDUPTHER

## 2022-11-08 RX ORDER — CEPHALEXIN 500 MG/1
500 CAPSULE ORAL 3 TIMES DAILY
Qty: 30 CAPSULE | Refills: 0 | Status: SHIPPED | OUTPATIENT
Start: 2022-11-08 | End: 2023-02-09

## 2022-11-08 RX ORDER — ATORVASTATIN CALCIUM 20 MG/1
20 TABLET, FILM COATED ORAL NIGHTLY
Qty: 90 TABLET | Refills: 1 | Status: SHIPPED | OUTPATIENT
Start: 2022-11-08 | End: 2023-02-09 | Stop reason: SDUPTHER

## 2022-11-08 NOTE — PROGRESS NOTES
Venipuncture Blood Specimen Collection  Venipuncture performed in left arm by Amy Chow with good hemostasis. Patient tolerated the procedure well without complications.   11/08/22   Amy Chow

## 2022-11-08 NOTE — PROGRESS NOTES
Chief Complaint  Diabetes, Hypertension, and Hyperlipidemia    Subjective            Rudy Moore presents to [facility]  History of Present Illness     Patient presents to the office today to follow-up on chronic health conditions and for medication refills.    His blood pressure is elevated on exam today, 200/90, and repeat was 200/102 (after taking metoprolol in the office and sitting for 20 minutes).  This is not typical for him.  He takes his quinapril 40 mg nightly, and metoprolol 50 mg each morning.  He has not had his blood pressure medication this morning.  He denies any chest pain, palpitations, headaches, shortness of breath, or lower extremity edema.  He does continue to have intermittent dizzy spells, but work-up for this previously has been unremarkable.  He had work-up in this office in April and was referred to cardiology.  He has had echocardiogram, 48-hour Holter monitor, carotid ultrasound, and stress test.    His stress test was normal.  His bilateral carotid Doppler showed no significant stenosis, with minimal to mild plaque in the right carotid bifurcation and mild to moderate on the left.  No significant change when compared to the 8/25 2020 study.  Holter monitor showed sinus rhythm with an average rate of 78 bpm and rare sinus tachycardia with a max heart rate of 106.  No evidence of arrhythmias and very rare isolated PACs and PVCs observed.  He was asymptomatic throughout the monitor.  His echocardiogram showed EF of 61.6%.  There was calcification of the aortic valve and mild aortic valve stenosis present.  Calcification of the mitral valve also noted with trace mitral valve regurgitation present.  Trace tricuspid valve regurgitation as well.    He is seeing nephrology for stage III chronic kidney disease.    He continues to take tramadol 100 mg twice daily for chronic musculoskeletal pain secondary to DISH.  Denies any adverse side effects with  use.  He denies any constipation or daytime somnolence.  He reports his pain is much better now than it was before.  Average pain is a 3 on 1-10 scale with treatment.    PEG: A Three-Item Scale Assessing Pain Intensity and Interference  0 being no pain 10 pain as bad as you can imagine  1. What number best describes your pain on average in the past week? 3  2.  What number best describes how, during the past week, pain has interfered with your enjoyment of life? 5  3.  What number best describes how, during the past week, pain has interfered with your general activity? 5    He did recently get injured by one of his cows.  He has sold all of them, but had to take 1 to the stock yard.  She was old and the fire did not want her.  This is the one that hurt him.  She kicked his right lower extremity.    Past Medical History:   Diagnosis Date   • CAD (coronary artery disease)    • Cataract    • Diabetes mellitus, type 2 (HCC)    • Essential hypertension 07/23/2020   • GERD (gastroesophageal reflux disease)    • Hyperlipidemia    • Kidney stones    • Peripheral arterial disease (HCC) 07/23/2020   • Seasonal allergies        No Known Allergies     Past Surgical History:   Procedure Laterality Date   • CARDIAC SURGERY      BYPASS   • CAROTID ENDARTERECTOMY Right 08/2020   • FEMORAL ENDARTERECTOMY Bilateral 10/2020   • ILIAC ARTERY STENT Bilateral 10/2020        Social History     Tobacco Use   • Smoking status: Former     Packs/day: 2.00     Types: Cigarettes   • Smokeless tobacco: Never   • Tobacco comments:     SMOKES 1 TO 2 PACKS PER DAY, CURRENTLY USES OTHER TOBACCO PRODUCTS    Substance Use Topics   • Alcohol use: Yes     Comment: DRINKS ALCHOL, 8-14 DRINKS PER WEEK    • Drug use: Never       Family History   Problem Relation Age of Onset   • Arthritis Mother    • Alcohol abuse Father    • Asthma Sister    • COPD Sister    • Breast cancer Sister    • Diabetes type I Brother    • Stroke Brother         Health Maintenance  Due   Topic Date Due   • ZOSTER VACCINE (2 of 3) 04/12/2010   • DIABETIC EYE EXAM  08/06/2021   • COVID-19 Vaccine (4 - Booster for Moderna series) 12/31/2021   • HEMOGLOBIN A1C  06/30/2022        Current Outpatient Medications on File Prior to Visit   Medication Sig   • aspirin 81 MG EC tablet Take 81 mg by mouth Daily.   • methocarbamol (Robaxin) 500 MG tablet Take 1 tablet by mouth 4 (Four) Times a Day As Needed for Muscle Spasms.   • metoprolol tartrate (LOPRESSOR) 50 MG tablet TAKE 1 TABLET DAILY   • multivitamin with minerals tablet tablet Take 1 tablet by mouth Daily.   • [DISCONTINUED] atorvastatin (LIPITOR) 20 MG tablet TAKE 1 TABLET EVERY NIGHT   • [DISCONTINUED] cilostazol (PLETAL) 100 MG tablet TAKE 1 TABLET TWICE A DAY   • [DISCONTINUED] metFORMIN (Glucophage) 850 MG tablet Take 1 tablet by mouth Daily With Dinner.   • [DISCONTINUED] quinapril (ACCUPRIL) 40 MG tablet TAKE 1 TABLET DAILY   • [DISCONTINUED] traMADol (ULTRAM) 50 MG tablet Take 2 tablets by mouth 2 (Two) Times a Day As Needed for Moderate Pain .     No current facility-administered medications on file prior to visit.       Immunization History   Administered Date(s) Administered   • COVID-19 (MODERNA) 1st, 2nd, 3rd Dose Only 02/09/2021, 02/09/2021, 03/11/2021, 03/11/2021, 11/05/2021   • Fluad Quad 65+ 10/18/2021   • Fluzone High Dose =>65 Years (Vaxcare ONLY) 10/14/2020, 10/08/2022   • Fluzone High-Dose 65+yrs 10/14/2020   • Influenza, Unspecified 08/01/2017, 10/18/2021   • Pneumococcal Conjugate 13-Valent (PCV13) 04/01/2017   • Tdap 01/27/2022   • Zostavax 02/15/2010       Review of Systems     Objective     BP (!) 200/102   Pulse 111   Temp 96.1 °F (35.6 °C)   Wt 99.8 kg (220 lb)   SpO2 96%   BMI 32.96 kg/m²       Physical Exam  Vitals reviewed.   Constitutional:       General: He is not in acute distress.     Appearance: He is well-developed. He is obese.   HENT:      Head: Normocephalic and atraumatic.   Eyes:      General: No  scleral icterus.     Extraocular Movements: Extraocular movements intact.      Conjunctiva/sclera: Conjunctivae normal.   Neck:      Thyroid: No thyroid mass, thyromegaly or thyroid tenderness.      Vascular: No carotid bruit.      Trachea: Trachea normal.   Cardiovascular:      Rate and Rhythm: Normal rate and regular rhythm.      Pulses: Normal pulses.      Heart sounds: Murmur heard.   Pulmonary:      Effort: Pulmonary effort is normal. No respiratory distress.      Breath sounds: Normal breath sounds. No wheezing, rhonchi or rales.   Musculoskeletal:         General: Normal range of motion.      Cervical back: Normal range of motion and neck supple.      Right lower leg: Edema (TRACE) present.      Left lower leg: No edema.      Comments: RLE with erythema and mild edema along the shin - there are some areas of scabbing. No drainage or exudates, but TTP.    Lymphadenopathy:      Cervical: No cervical adenopathy.   Skin:     General: Skin is warm and dry.   Neurological:      Mental Status: He is alert and oriented to person, place, and time.   Psychiatric:         Mood and Affect: Mood and affect normal.         Behavior: Behavior normal.         Thought Content: Thought content normal.         Judgment: Judgment normal.         Result Review :     The following data was reviewed by: RUSS Herrera on 11/08/2022:    CMP    CMP 12/30/21 4/12/22 7/26/22   Glucose 123 (A) 133 (A) 119 (A)   BUN 16 15 20   Creatinine 1.27 1.41 (A) 1.29 (A)   eGFR Non African Am 55 (A)     Sodium 140 138 140   Potassium 4.8 4.6 5.5 (A)   Chloride 100 97 (A) 101   Calcium 9.8 9.7 9.8   Albumin 4.00 4.00 3.90   Total Bilirubin 0.5 1.3 (A) 0.6   Alkaline Phosphatase 106 143 (A) 72   AST (SGOT) 18 54 (A) 20   ALT (SGPT) 21 170 (A) 20   (A) Abnormal value            CBC    CBC 1/27/22 4/12/22 7/26/22   WBC 8.09 10.71 7.66   RBC 4.43 4.25 3.88 (A)   Hemoglobin 14.6 14.1 13.4   Hematocrit 43.1 42.4 39.4   MCV 97.3 (A) 99.8 (A)  101.5 (A)   MCH 33.0 33.2 (A) 34.5 (A)   MCHC 33.9 33.3 34.0   RDW 12.5 12.6 12.5   Platelets 246 287 263   (A) Abnormal value            Lipid Panel    Lipid Panel 1/27/22   Total Cholesterol 183   Triglycerides 86   HDL Cholesterol 82 (A)   VLDL Cholesterol 15   LDL Cholesterol  86   LDL/HDL Ratio 1.02   (A) Abnormal value            TSH    TSH 1/27/22   TSH 3.300           A1C Last 3 Results    HGBA1C Last 3 Results 12/30/21   Hemoglobin A1C 6.57 (A)   (A) Abnormal value            Microalbumin    Microalbumin 12/30/21 4/12/22   Microalbumin, Urine 2.7 12.3           PSA    PSA 1/27/22   PSA 0.091           Last Urine Toxicity     LAST URINE TOXICITY RESULTS Latest Ref Rng & Units 4/12/2022 4/12/2022    CREATININE UR mg/dL 253.5 272.0    BARBITURATES SCREEN Negative - -    BENZODIAZEPINE SCREEN, URINE Negative - -    COCAINE SCREEN, URINE Negative - -    METHADONE SCREEN, URINE Negative - -          Data reviewed: Cardiology studies :   Adult Transthoracic Echo Complete W/ Cont if Necessary Per Protocol (05/03/2022 12:09)  Holter monitor - 48 hour (05/03/2022 11:29)  Duplex Carotid Ultrasound CAR (05/03/2022 11:42)  SCANNED - HOLTER MONITOR (05/03/2022)  Stress Test With Myocardial Perfusion One Day (05/17/2022 11:39)           Assessment and Plan      Diagnoses and all orders for this visit:    1. Type 2 diabetes mellitus with stage 3a chronic kidney disease, without long-term current use of insulin (HCC) (Primary)  Comments:  Encouraged annual eye exam  Orders:  -     metFORMIN (Glucophage) 850 MG tablet; Take 1 tablet by mouth Daily With Dinner.  Dispense: 90 tablet; Refill: 1  -     CBC Auto Differential  -     Comprehensive Metabolic Panel  -     Hemoglobin A1c  -     Lipid Panel  -     TSH+Free T4  -     Microalbumin / Creatinine Urine Ratio - Urine, Clean Catch    2. Essential hypertension  -     quinapril (ACCUPRIL) 40 MG tablet; Take 1 tablet by mouth Daily.  Dispense: 90 tablet; Refill: 1  -     CBC  Auto Differential  -     Comprehensive Metabolic Panel  -     Lipid Panel  -     TSH+Free T4  -     Microalbumin / Creatinine Urine Ratio - Urine, Clean Catch    3. Hyperlipidemia, unspecified hyperlipidemia type  -     atorvastatin (LIPITOR) 20 MG tablet; Take 1 tablet by mouth Every Night.  Dispense: 90 tablet; Refill: 1  -     Comprehensive Metabolic Panel  -     Lipid Panel    4. Peripheral vascular disease with claudication (HCC)  -     cilostazol (PLETAL) 100 MG tablet; Take 1 tablet by mouth 2 (Two) Times a Day.  Dispense: 180 tablet; Refill: 1    5. DISH (diffuse idiopathic skeletal hyperostosis)  -     traMADol (ULTRAM) 50 MG tablet; Take 2 tablets by mouth 2 (Two) Times a Day As Needed for Moderate Pain.  Dispense: 120 tablet; Refill: 2    6. Musculoskeletal pain  -     traMADol (ULTRAM) 50 MG tablet; Take 2 tablets by mouth 2 (Two) Times a Day As Needed for Moderate Pain.  Dispense: 120 tablet; Refill: 2    7. Stage 3a chronic kidney disease (HCC)  -     traMADol (ULTRAM) 50 MG tablet; Take 2 tablets by mouth 2 (Two) Times a Day As Needed for Moderate Pain.  Dispense: 120 tablet; Refill: 2    8. High risk medication use  -     traMADol (ULTRAM) 50 MG tablet; Take 2 tablets by mouth 2 (Two) Times a Day As Needed for Moderate Pain.  Dispense: 120 tablet; Refill: 2    9. Cellulitis of right lower extremity  -     cephalexin (Keflex) 500 MG capsule; Take 1 capsule by mouth 3 (Three) Times a Day.  Dispense: 30 capsule; Refill: 0  -     mupirocin (BACTROBAN) 2 % ointment; Apply 1 application topically to the appropriate area as directed 3 (Three) Times a Day.  Dispense: 30 g; Refill: 0            Follow Up     Return if symptoms worsen or fail to improve.  Treat right lower extremity cellulitis with Keflex and mupirocin.  Elevate as able and ice if needed.    I have asked him to take another metoprolol 50mg today. He did take this prior to leaving the office. They are going to check his BP at home in one hour  and call me with readings. I want him to check daily for the next few days and call those readings. If his BP is not decreasing then we may need to add a third medication, but also have him follow up with cardiology.  Monitor for signs/symptoms of stroke.  Call 911 with any symptoms or get to the emergency room immediately.    Patient was given instructions and counseling regarding his condition or for health maintenance advice. Please see specific information pulled into the AVS if appropriate.

## 2022-11-09 LAB
BASOPHILS # BLD AUTO: 0.04 10*3/MM3 (ref 0–0.2)
BASOPHILS NFR BLD AUTO: 0.5 % (ref 0–1.5)
DEPRECATED RDW RBC AUTO: 44.9 FL (ref 37–54)
EOSINOPHIL # BLD AUTO: 0.2 10*3/MM3 (ref 0–0.4)
EOSINOPHIL NFR BLD AUTO: 2.7 % (ref 0.3–6.2)
ERYTHROCYTE [DISTWIDTH] IN BLOOD BY AUTOMATED COUNT: 12.4 % (ref 12.3–15.4)
HCT VFR BLD AUTO: 39.1 % (ref 37.5–51)
HGB BLD-MCNC: 13.4 G/DL (ref 13–17.7)
IMM GRANULOCYTES # BLD AUTO: 0.02 10*3/MM3 (ref 0–0.05)
IMM GRANULOCYTES NFR BLD AUTO: 0.3 % (ref 0–0.5)
LYMPHOCYTES # BLD AUTO: 1.83 10*3/MM3 (ref 0.7–3.1)
LYMPHOCYTES NFR BLD AUTO: 24.5 % (ref 19.6–45.3)
MCH RBC QN AUTO: 34.3 PG (ref 26.6–33)
MCHC RBC AUTO-ENTMCNC: 34.3 G/DL (ref 31.5–35.7)
MCV RBC AUTO: 100 FL (ref 79–97)
MONOCYTES # BLD AUTO: 0.6 10*3/MM3 (ref 0.1–0.9)
MONOCYTES NFR BLD AUTO: 8 % (ref 5–12)
NEUTROPHILS NFR BLD AUTO: 4.77 10*3/MM3 (ref 1.7–7)
NEUTROPHILS NFR BLD AUTO: 64 % (ref 42.7–76)
NRBC BLD AUTO-RTO: 0 /100 WBC (ref 0–0.2)
PLATELET # BLD AUTO: 281 10*3/MM3 (ref 140–450)
PMV BLD AUTO: 9.3 FL (ref 6–12)
RBC # BLD AUTO: 3.91 10*6/MM3 (ref 4.14–5.8)
WBC NRBC COR # BLD: 7.46 10*3/MM3 (ref 3.4–10.8)

## 2022-11-14 ENCOUNTER — TRANSCRIBE ORDERS (OUTPATIENT)
Dept: FAMILY MEDICINE CLINIC | Facility: CLINIC | Age: 82
End: 2022-11-14

## 2022-11-14 ENCOUNTER — CLINICAL SUPPORT (OUTPATIENT)
Dept: FAMILY MEDICINE CLINIC | Facility: CLINIC | Age: 82
End: 2022-11-14

## 2022-11-14 VITALS — SYSTOLIC BLOOD PRESSURE: 140 MMHG | DIASTOLIC BLOOD PRESSURE: 68 MMHG

## 2022-11-14 DIAGNOSIS — N18.30 STAGE 3 CHRONIC KIDNEY DISEASE, UNSPECIFIED WHETHER STAGE 3A OR 3B CKD: Primary | ICD-10-CM

## 2022-11-14 DIAGNOSIS — I10 ESSENTIAL HYPERTENSION: ICD-10-CM

## 2022-11-14 DIAGNOSIS — E11.9 DIABETES MELLITUS WITHOUT COMPLICATION: ICD-10-CM

## 2022-11-14 LAB
BASOPHILS # BLD AUTO: 0.05 10*3/MM3 (ref 0–0.2)
BASOPHILS NFR BLD AUTO: 0.7 % (ref 0–1.5)
DEPRECATED RDW RBC AUTO: 42.5 FL (ref 37–54)
EOSINOPHIL # BLD AUTO: 0.15 10*3/MM3 (ref 0–0.4)
EOSINOPHIL NFR BLD AUTO: 2 % (ref 0.3–6.2)
ERYTHROCYTE [DISTWIDTH] IN BLOOD BY AUTOMATED COUNT: 12 % (ref 12.3–15.4)
HCT VFR BLD AUTO: 38.4 % (ref 37.5–51)
HGB BLD-MCNC: 13.4 G/DL (ref 13–17.7)
IMM GRANULOCYTES # BLD AUTO: 0.06 10*3/MM3 (ref 0–0.05)
IMM GRANULOCYTES NFR BLD AUTO: 0.8 % (ref 0–0.5)
LYMPHOCYTES # BLD AUTO: 2.16 10*3/MM3 (ref 0.7–3.1)
LYMPHOCYTES NFR BLD AUTO: 29.3 % (ref 19.6–45.3)
MCH RBC QN AUTO: 33.6 PG (ref 26.6–33)
MCHC RBC AUTO-ENTMCNC: 34.9 G/DL (ref 31.5–35.7)
MCV RBC AUTO: 96.2 FL (ref 79–97)
MONOCYTES # BLD AUTO: 0.66 10*3/MM3 (ref 0.1–0.9)
MONOCYTES NFR BLD AUTO: 9 % (ref 5–12)
NEUTROPHILS NFR BLD AUTO: 4.28 10*3/MM3 (ref 1.7–7)
NEUTROPHILS NFR BLD AUTO: 58.2 % (ref 42.7–76)
NRBC BLD AUTO-RTO: 0 /100 WBC (ref 0–0.2)
PLATELET # BLD AUTO: 284 10*3/MM3 (ref 140–450)
PMV BLD AUTO: 9.3 FL (ref 6–12)
RBC # BLD AUTO: 3.99 10*6/MM3 (ref 4.14–5.8)
WBC NRBC COR # BLD: 7.36 10*3/MM3 (ref 3.4–10.8)

## 2022-11-14 PROCEDURE — 36415 COLL VENOUS BLD VENIPUNCTURE: CPT | Performed by: NURSE PRACTITIONER

## 2022-11-14 PROCEDURE — 85025 COMPLETE CBC W/AUTO DIFF WBC: CPT | Performed by: INTERNAL MEDICINE

## 2022-11-14 PROCEDURE — 80053 COMPREHEN METABOLIC PANEL: CPT | Performed by: INTERNAL MEDICINE

## 2022-11-14 NOTE — PROGRESS NOTES
Venipuncture Blood Specimen Collection  Venipuncture performed in left arm by Amy Chow with good hemostasis. Patient tolerated the procedure well without complications.   11/14/22   Amy Chow

## 2022-11-15 LAB
ALBUMIN SERPL-MCNC: 3.6 G/DL (ref 3.5–5.2)
ALBUMIN/GLOB SERPL: 1.1 G/DL
ALP SERPL-CCNC: 82 U/L (ref 39–117)
ALT SERPL W P-5'-P-CCNC: 19 U/L (ref 1–41)
ANION GAP SERPL CALCULATED.3IONS-SCNC: 9.8 MMOL/L (ref 5–15)
AST SERPL-CCNC: 20 U/L (ref 1–40)
BILIRUB SERPL-MCNC: 0.6 MG/DL (ref 0–1.2)
BUN SERPL-MCNC: 18 MG/DL (ref 8–23)
BUN/CREAT SERPL: 11.7 (ref 7–25)
CALCIUM SPEC-SCNC: 9.9 MG/DL (ref 8.6–10.5)
CHLORIDE SERPL-SCNC: 103 MMOL/L (ref 98–107)
CO2 SERPL-SCNC: 28.2 MMOL/L (ref 22–29)
CREAT SERPL-MCNC: 1.54 MG/DL (ref 0.76–1.27)
EGFRCR SERPLBLD CKD-EPI 2021: 45.3 ML/MIN/1.73
GLOBULIN UR ELPH-MCNC: 3.3 GM/DL
GLUCOSE SERPL-MCNC: 92 MG/DL (ref 65–99)
POTASSIUM SERPL-SCNC: 4.8 MMOL/L (ref 3.5–5.2)
PROT SERPL-MCNC: 6.9 G/DL (ref 6–8.5)
SODIUM SERPL-SCNC: 141 MMOL/L (ref 136–145)

## 2023-01-13 DIAGNOSIS — I10 ESSENTIAL HYPERTENSION: ICD-10-CM

## 2023-01-13 RX ORDER — METOPROLOL TARTRATE 50 MG/1
TABLET, FILM COATED ORAL
Qty: 30 TABLET | Refills: 0 | OUTPATIENT
Start: 2023-01-13

## 2023-01-13 RX ORDER — METOPROLOL TARTRATE 50 MG/1
50 TABLET, FILM COATED ORAL DAILY
Qty: 30 TABLET | Refills: 0 | Status: SHIPPED | OUTPATIENT
Start: 2023-01-13 | End: 2023-02-09 | Stop reason: SDUPTHER

## 2023-01-18 ENCOUNTER — TELEPHONE (OUTPATIENT)
Dept: FAMILY MEDICINE CLINIC | Facility: CLINIC | Age: 83
End: 2023-01-18

## 2023-01-18 NOTE — TELEPHONE ENCOUNTER
Caller: [facility] - [place], [place] - [address] - [phone] [facility] [phone] FX    Relationship to patient: Pharmacy    Best call back number: [phone]    Patient is needing: PHARMACY TECHNICIAN CALLED IN AND WANTED TO LET PCP KNOW THAT   quinapril (ACCUPRIL) 40 MG tablet  40 mg, Daily     IS ON BACK ORDER AT THIS TIME. REFERENCE #[phone]

## 2023-02-09 ENCOUNTER — OFFICE VISIT (OUTPATIENT)
Dept: FAMILY MEDICINE CLINIC | Facility: CLINIC | Age: 83
End: 2023-02-09
Payer: MEDICARE

## 2023-02-09 VITALS
OXYGEN SATURATION: 98 % | WEIGHT: 198 LBS | HEIGHT: 68 IN | BODY MASS INDEX: 30.01 KG/M2 | DIASTOLIC BLOOD PRESSURE: 86 MMHG | HEART RATE: 100 BPM | TEMPERATURE: 97.1 F | SYSTOLIC BLOOD PRESSURE: 136 MMHG

## 2023-02-09 DIAGNOSIS — N18.31 TYPE 2 DIABETES MELLITUS WITH STAGE 3A CHRONIC KIDNEY DISEASE, WITHOUT LONG-TERM CURRENT USE OF INSULIN: Primary | ICD-10-CM

## 2023-02-09 DIAGNOSIS — Z79.899 HIGH RISK MEDICATION USE: ICD-10-CM

## 2023-02-09 DIAGNOSIS — M48.10 DISH (DIFFUSE IDIOPATHIC SKELETAL HYPEROSTOSIS): ICD-10-CM

## 2023-02-09 DIAGNOSIS — Z12.5 PROSTATE CANCER SCREENING: ICD-10-CM

## 2023-02-09 DIAGNOSIS — E78.5 HYPERLIPIDEMIA, UNSPECIFIED HYPERLIPIDEMIA TYPE: ICD-10-CM

## 2023-02-09 DIAGNOSIS — M79.18 MUSCULOSKELETAL PAIN: ICD-10-CM

## 2023-02-09 DIAGNOSIS — N18.31 STAGE 3A CHRONIC KIDNEY DISEASE: ICD-10-CM

## 2023-02-09 DIAGNOSIS — I10 ESSENTIAL HYPERTENSION: ICD-10-CM

## 2023-02-09 DIAGNOSIS — E11.22 TYPE 2 DIABETES MELLITUS WITH STAGE 3A CHRONIC KIDNEY DISEASE, WITHOUT LONG-TERM CURRENT USE OF INSULIN: Primary | ICD-10-CM

## 2023-02-09 DIAGNOSIS — I73.9 PERIPHERAL VASCULAR DISEASE WITH CLAUDICATION: ICD-10-CM

## 2023-02-09 DIAGNOSIS — Z91.81 AT HIGH RISK FOR FALLS: ICD-10-CM

## 2023-02-09 LAB
ALBUMIN SERPL-MCNC: 4.1 G/DL (ref 3.5–5.2)
ALBUMIN UR-MCNC: 2.3 MG/DL
ALBUMIN/GLOB SERPL: 1.5 G/DL
ALP SERPL-CCNC: 77 U/L (ref 39–117)
ALT SERPL W P-5'-P-CCNC: 22 U/L (ref 1–41)
ANION GAP SERPL CALCULATED.3IONS-SCNC: 10 MMOL/L (ref 5–15)
AST SERPL-CCNC: 23 U/L (ref 1–40)
BASOPHILS # BLD AUTO: 0.04 10*3/MM3 (ref 0–0.2)
BASOPHILS NFR BLD AUTO: 0.5 % (ref 0–1.5)
BILIRUB SERPL-MCNC: 0.6 MG/DL (ref 0–1.2)
BUN SERPL-MCNC: 20 MG/DL (ref 8–23)
BUN/CREAT SERPL: 14.3 (ref 7–25)
CALCIUM SPEC-SCNC: 9.4 MG/DL (ref 8.6–10.5)
CHLORIDE SERPL-SCNC: 101 MMOL/L (ref 98–107)
CHOLEST SERPL-MCNC: 191 MG/DL (ref 0–200)
CO2 SERPL-SCNC: 29 MMOL/L (ref 22–29)
CREAT SERPL-MCNC: 1.4 MG/DL (ref 0.76–1.27)
CREAT UR-MCNC: 191.7 MG/DL
DEPRECATED RDW RBC AUTO: 45.4 FL (ref 37–54)
EGFRCR SERPLBLD CKD-EPI 2021: 50.5 ML/MIN/1.73
EOSINOPHIL # BLD AUTO: 0.2 10*3/MM3 (ref 0–0.4)
EOSINOPHIL NFR BLD AUTO: 2.7 % (ref 0.3–6.2)
ERYTHROCYTE [DISTWIDTH] IN BLOOD BY AUTOMATED COUNT: 12.5 % (ref 12.3–15.4)
GLOBULIN UR ELPH-MCNC: 2.8 GM/DL
GLUCOSE SERPL-MCNC: 112 MG/DL (ref 65–99)
HBA1C MFR BLD: 6 % (ref 4.8–5.6)
HCT VFR BLD AUTO: 41.5 % (ref 37.5–51)
HDLC SERPL-MCNC: 83 MG/DL (ref 40–60)
HGB BLD-MCNC: 14.2 G/DL (ref 13–17.7)
IMM GRANULOCYTES # BLD AUTO: 0.02 10*3/MM3 (ref 0–0.05)
IMM GRANULOCYTES NFR BLD AUTO: 0.3 % (ref 0–0.5)
LDLC SERPL CALC-MCNC: 90 MG/DL (ref 0–100)
LDLC/HDLC SERPL: 1.06 {RATIO}
LYMPHOCYTES # BLD AUTO: 2.59 10*3/MM3 (ref 0.7–3.1)
LYMPHOCYTES NFR BLD AUTO: 34.5 % (ref 19.6–45.3)
MCH RBC QN AUTO: 34.1 PG (ref 26.6–33)
MCHC RBC AUTO-ENTMCNC: 34.2 G/DL (ref 31.5–35.7)
MCV RBC AUTO: 99.8 FL (ref 79–97)
MICROALBUMIN/CREAT UR: 12 MG/G
MONOCYTES # BLD AUTO: 0.62 10*3/MM3 (ref 0.1–0.9)
MONOCYTES NFR BLD AUTO: 8.3 % (ref 5–12)
NEUTROPHILS NFR BLD AUTO: 4.04 10*3/MM3 (ref 1.7–7)
NEUTROPHILS NFR BLD AUTO: 53.7 % (ref 42.7–76)
NRBC BLD AUTO-RTO: 0 /100 WBC (ref 0–0.2)
PLATELET # BLD AUTO: 230 10*3/MM3 (ref 140–450)
PMV BLD AUTO: 9.8 FL (ref 6–12)
POTASSIUM SERPL-SCNC: 4.6 MMOL/L (ref 3.5–5.2)
PROT SERPL-MCNC: 6.9 G/DL (ref 6–8.5)
PSA SERPL-MCNC: 0.04 NG/ML (ref 0–4)
RBC # BLD AUTO: 4.16 10*6/MM3 (ref 4.14–5.8)
SODIUM SERPL-SCNC: 140 MMOL/L (ref 136–145)
T4 FREE SERPL-MCNC: 1.07 NG/DL (ref 0.93–1.7)
TRIGL SERPL-MCNC: 101 MG/DL (ref 0–150)
TSH SERPL DL<=0.05 MIU/L-ACNC: 2.07 UIU/ML (ref 0.27–4.2)
VLDLC SERPL-MCNC: 18 MG/DL (ref 5–40)
WBC NRBC COR # BLD: 7.51 10*3/MM3 (ref 3.4–10.8)

## 2023-02-09 PROCEDURE — G0103 PSA SCREENING: HCPCS | Performed by: NURSE PRACTITIONER

## 2023-02-09 PROCEDURE — 84443 ASSAY THYROID STIM HORMONE: CPT | Performed by: NURSE PRACTITIONER

## 2023-02-09 PROCEDURE — 82570 ASSAY OF URINE CREATININE: CPT | Performed by: NURSE PRACTITIONER

## 2023-02-09 PROCEDURE — 85025 COMPLETE CBC W/AUTO DIFF WBC: CPT | Performed by: NURSE PRACTITIONER

## 2023-02-09 PROCEDURE — 84439 ASSAY OF FREE THYROXINE: CPT | Performed by: NURSE PRACTITIONER

## 2023-02-09 PROCEDURE — 83036 HEMOGLOBIN GLYCOSYLATED A1C: CPT | Performed by: NURSE PRACTITIONER

## 2023-02-09 PROCEDURE — 80053 COMPREHEN METABOLIC PANEL: CPT | Performed by: NURSE PRACTITIONER

## 2023-02-09 PROCEDURE — 80061 LIPID PANEL: CPT | Performed by: NURSE PRACTITIONER

## 2023-02-09 PROCEDURE — 99214 OFFICE O/P EST MOD 30 MIN: CPT | Performed by: NURSE PRACTITIONER

## 2023-02-09 PROCEDURE — 82043 UR ALBUMIN QUANTITATIVE: CPT | Performed by: NURSE PRACTITIONER

## 2023-02-09 RX ORDER — QUINAPRIL 40 MG/1
40 TABLET ORAL DAILY
Qty: 90 TABLET | Refills: 1 | Status: SHIPPED | OUTPATIENT
Start: 2023-02-09 | End: 2023-03-13 | Stop reason: SDUPTHER

## 2023-02-09 RX ORDER — METHOCARBAMOL 500 MG/1
500 TABLET, FILM COATED ORAL 4 TIMES DAILY PRN
Qty: 180 TABLET | Refills: 1 | Status: SHIPPED | OUTPATIENT
Start: 2023-02-09

## 2023-02-09 RX ORDER — ATORVASTATIN CALCIUM 20 MG/1
20 TABLET, FILM COATED ORAL NIGHTLY
Qty: 90 TABLET | Refills: 1 | Status: SHIPPED | OUTPATIENT
Start: 2023-02-09

## 2023-02-09 RX ORDER — CILOSTAZOL 100 MG/1
100 TABLET ORAL 2 TIMES DAILY
Qty: 180 TABLET | Refills: 1 | Status: SHIPPED | OUTPATIENT
Start: 2023-02-09

## 2023-02-09 RX ORDER — TRAMADOL HYDROCHLORIDE 50 MG/1
100 TABLET ORAL 2 TIMES DAILY PRN
Qty: 120 TABLET | Refills: 2 | Status: SHIPPED | OUTPATIENT
Start: 2023-02-09

## 2023-02-09 RX ORDER — METOPROLOL TARTRATE 50 MG/1
50 TABLET, FILM COATED ORAL DAILY
Qty: 90 TABLET | Refills: 1 | Status: SHIPPED | OUTPATIENT
Start: 2023-02-09 | End: 2023-02-15 | Stop reason: SDUPTHER

## 2023-02-09 NOTE — PATIENT INSTRUCTIONS
Fall Prevention in the Home, Adult  Falls can cause injuries and affect people of all ages. There are many simple things that you can do to make your home safe and to help prevent falls. Ask for help when making these changes, if needed.  What actions can I take to prevent falls?  General instructions  • Use good lighting in all rooms. Replace any light bulbs that burn out, turn on lights if it is dark, and use night-lights.  • Place frequently used items in easy-to-reach places. Lower the shelves around your home if necessary.  • Set up furniture so that there are clear paths around it. Avoid moving your furniture around.  • Remove throw rugs and other tripping hazards from the floor.  • Avoid walking on wet floors.  • Fix any uneven floor surfaces.  • Add color or contrast paint or tape to grab bars and handrails in your home. Place contrasting color strips on the first and last steps of staircases.  • When you use a stepladder, make sure that it is completely opened and that the sides and supports are firmly locked. Have someone hold the ladder while you are using it. Do not climb a closed stepladder.  • Know where your pets are when moving through your home.  What can I do in the bathroom?     • Keep the floor dry. Immediately clean up any water that is on the floor.  • Remove soap buildup in the tub or shower regularly.  • Use nonskid mats or decals on the floor of the tub or shower.  • Attach bath mats securely with double-sided, nonslip rug tape.  • If you need to sit down while you are in the shower, use a plastic, nonslip stool.  • Install grab bars by the toilet and in the tub and shower. Do not use towel bars as grab bars.  What can I do in the bedroom?  • Make sure that a bedside light is easy to reach.  • Do not use oversized bedding that reaches the floor.  • Have a firm chair that has side arms to use for getting dressed.  What can I do in the kitchen?  • Clean up any spills right away.  • If you  need to reach for something above you, use a sturdy step stool that has a grab bar.  • Keep electrical cables out of the way.  • Do not use floor polish or wax that makes floors slippery. If you must use wax, make sure that it is non-skid floor wax.  What can I do with my stairs?  • Do not leave any items on the stairs.  • Make sure that you have a light switch at the top and the bottom of the stairs. Have them installed if you do not have them.  • Make sure that there are handrails on both sides of the stairs. Fix handrails that are broken or loose. Make sure that handrails are as long as the staircases.  • Install non-slip stair treads on all stairs in your home.  • Avoid having throw rugs at the top or bottom of stairs, or secure the rugs with carpet tape to prevent them from moving.  • Choose a carpet design that does not hide the edge of steps on the stairs.  • Check any carpeting to make sure that it is firmly attached to the stairs. Fix any carpet that is loose or worn.  What can I do on the outside of my home?  • Use bright outdoor lighting.  • Regularly repair the edges of walkways and driveways and fix any cracks.  • Remove high doorway thresholds.  • Trim any shrubbery on the main path into your home.  • Regularly check that handrails are securely fastened and in good repair. Both sides of all steps should have handrails.  • Install guardrails along the edges of any raised decks or porches.  • Clear walkways of debris and clutter, including tools and rocks.  • Have leaves, snow, and ice cleared regularly.  • Use sand or salt on walkways during winter months.  • In the garage, clean up any spills right away, including grease or oil spills.  What other actions can I take?  • Wear closed-toe shoes that fit well and support your feet. Wear shoes that have rubber soles or low heels.  • Use mobility aids as needed, such as canes, walkers, scooters, and crutches.  • Review your medicines with your health care  provider. Some medicines can cause dizziness or changes in blood pressure, which increase your risk of falling.  Talk with your health care provider about other ways that you can decrease your risk of falls. This may include working with a physical therapist or  to improve your strength, balance, and endurance.  Where to find more information  • Centers for Disease Control and Prevention, [place]: www.cdc.gov  • National [place] on Aging: www.prabhu.nih.gov  Contact a health care provider if:  • You are afraid of falling at home.  • You feel weak, drowsy, or dizzy at home.  • You fall at home.  Summary  • There are many simple things that you can do to make your home safe and to help prevent falls.  • Ways to make your home safe include removing tripping hazards and installing grab bars in the bathroom.  • Ask for help when making these changes in your home.  This information is not intended to replace advice given to you by your health care provider. Make sure you discuss any questions you have with your health care provider.  Document Revised: 09/19/2022 Document Reviewed: 07/21/2021  [employer] Patient Education © 2022 [employer]      Sit-to-Stand Exercise  The sit-to-stand exercise (also known as the chair stand or chair rise exercise) strengthens your lower body and helps you maintain or improve your mobility and independence. The end goal is to do the sit-to-stand exercise without using your hands. This will be easier as you become stronger. You should always talk with your health care provider before starting any exercise program, especially if you have had recent surgery.  Do the exercise exactly as told by your health care provider and adjust it as directed. It is normal to feel mild stretching, pulling, tightness, or discomfort as you do this exercise, but you should stop right away if you feel sudden pain or your pain gets worse. Do not begin doing this exercise until told by your health care  provider.  What the sit-to-stand exercise does  The sit-to-stand exercise helps to strengthen the muscles in your thighs and the muscles in the center of your body that give you stability (core muscles). This exercise is especially helpful if:  • You have had knee or hip surgery.  • You have trouble getting up from a chair, out of a car, or off the toilet due to muscle weakness.  How to do the sit-to-stand exercise  1. Sit toward the front edge of a sturdy chair without armrests. Your knees should be bent and your feet should be flat on the floor and shoulder-width apart and underneath your hips.  2. Place your hands lightly on each side of the seat. Keep your back and neck as straight as possible, with your chest slightly forward.  3. Breathe in slowly. Lean forward and slightly shift your weight to the front of your feet.  4. Breathe out as you slowly stand up. Try not to support any weight with your hands.  5. Stand and pause for a full breath in and out.  6. Breathe in as you sit down slowly. Tighten your core and abdominal muscles to control your lowering as much as possible. You should lower yourself back to the chair slowly, not just drop back into the seat.  7. Breathe out slowly.  8. Do this exercise 10-15 times. If needed, do it fewer times until you build up strength.  9. Rest for 1 minute, then do another set of 10-15 repetitions.  To change the difficulty of the sit-to-stand exercise  • If the exercise is too difficult, use a chair with sturdy armrests, and push off the armrests to help you come to the standing position. You can also use the armrests to help slowly lower yourself back to sitting. As this gets easier, try to use your arms less. You can also place a firm cushion or pillow on the chair to make the surface higher.  • If this exercise is too easy, do not use your arms to help raise or lower yourself. You can also wear a weighted vest, use hand weights, increase your repetitions, or try a  lower chair.  General tips  • You may feel tired when starting an exercise routine. This is normal.  • You may have muscle soreness that lasts a few days. This is normal. As you get stronger, you may not feel muscle soreness.  • Use smooth, steady movements.  • Do not  hold your breath during strength exercises. This can cause unsafe changes in your blood pressure.  • Breathe in slowly through your nose, and breathe out slowly through your mouth.  Summary  • Strengthening your lower body is an important step to help you move safely and independently.  • The sit-to-stand exercise helps strengthen the muscles in your thighs and core.  • You should always talk with your health care provider before starting any exercise program, especially if you have had recent surgery.  This information is not intended to replace advice given to you by your health care provider. Make sure you discuss any questions you have with your health care provider.  Document Revised: 04/10/2022 Document Reviewed: 04/10/2022  [employer] Patient Education © 2022 [employer]      Exercising to Stay Healthy  To become healthy and stay healthy, it is recommended that you do moderate-intensity and vigorous-intensity exercise. You can tell that you are exercising at a moderate intensity if your heart starts beating faster and you start breathing faster but can still hold a conversation. You can tell that you are exercising at a vigorous intensity if you are breathing much harder and faster and cannot hold a conversation while exercising.  How can exercise benefit me?  Exercising regularly is important. It has many health benefits, such as:  • Improving overall fitness, flexibility, and endurance.  • Increasing bone density.  • Helping with weight control.  • Decreasing body fat.  • Increasing muscle strength and endurance.  • Reducing stress and tension, anxiety, depression, or anger.  • Improving overall health.  What guidelines should I follow while  exercising?  • Before you start a new exercise program, talk with your health care provider.  • Do not exercise so much that you hurt yourself, feel dizzy, or get very short of breath.  • Wear comfortable clothes and wear shoes with good support.  • Drink plenty of water while you exercise to prevent dehydration or heat stroke.  • Work out until your breathing and your heartbeat get faster (moderate intensity).  How often should I exercise?  Choose an activity that you enjoy, and set realistic goals. Your health care provider can help you make an activity plan that is individually designed and works best for you.  Exercise regularly as told by your health care provider. This may include:  • Doing strength training two times a week, such as:  ? Lifting weights.  ? Using resistance bands.  ? Push-ups.  ? Sit-ups.  ? Yoga.  • Doing a certain intensity of exercise for a given amount of time. Choose from these options:  ? A total of 150 minutes of moderate-intensity exercise every week.  ? A total of 75 minutes of vigorous-intensity exercise every week.  ? A mix of moderate-intensity and vigorous-intensity exercise every week.  Children, pregnant women, people who have not exercised regularly, people who are overweight, and older adults may need to talk with a health care provider about what activities are safe to perform. If you have a medical condition, be sure to talk with your health care provider before you start a new exercise program.  What are some exercise ideas?  Moderate-intensity exercise ideas include:  • Walking 1 mile (1.6 km) in about 15 minutes.  • Biking.  • Hiking.  • Golfing.  • Dancing.  • Water aerobics.  Vigorous-intensity exercise ideas include:  • Walking 4.5 miles (7.2 km) or more in about 1 hour.  • Jogging or running 5 miles (8 km) in about 1 hour.  • Biking 10 miles (16.1 km) or more in about 1 hour.  • Lap swimming.  • Roller-skating or in-line skating.  • Cross-country skiing.  • Vigorous  competitive sports, such as football, basketball, and soccer.  • Jumping rope.  • Aerobic dancing.  What are some everyday activities that can help me get exercise?  • Yard work, such as:  ? Pushing a .  ? Raking and bagging leaves.  • Washing your car.  • Pushing a stroller.  • Shoveling snow.  • Gardening.  • Washing windows or floors.  How can I be more active in my day-to-day activities?  • Use stairs instead of an elevator.  • Take a walk during your lunch break.  • If you drive, park your car farther away from your work or school.  • If you take public transportation, get off one stop early and walk the rest of the way.  • Stand up or walk around during all of your indoor phone calls.  • Get up, stretch, and walk around every 30 minutes throughout the day.  • Enjoy exercise with a friend. Support to continue exercising will help you keep a regular routine of activity.  Where to find more information  You can find more information about exercising to stay healthy from:  • [place] Department of Health and Human Services: www.hhs.gov  • Centers for Disease Control and Prevention (CDC): www.cdc.gov  Summary  • Exercising regularly is important. It will improve your overall fitness, flexibility, and endurance.  • Regular exercise will also improve your overall health. It can help you control your weight, reduce stress, and improve your bone density.  • Do not exercise so much that you hurt yourself, feel dizzy, or get very short of breath.  • Before you start a new exercise program, talk with your health care provider.  This information is not intended to replace advice given to you by your health care provider. Make sure you discuss any questions you have with your health care provider.  Document Revised: 04/15/2022 Document Reviewed: 04/15/2022  [employer] Patient Education © 2022 [employer]

## 2023-02-09 NOTE — PROGRESS NOTES
Venipuncture Blood Specimen Collection  Venipuncture performed in left arm by Amy Mathew with good hemostasis. Patient tolerated the procedure well without complications.   02/09/23   Amy Mathew

## 2023-02-09 NOTE — PROGRESS NOTES
Chief Complaint  Hyperlipidemia and Diabetes    Subjective            Rudy Moore presents to [facility]  History of Present Illness     Elliott is here today, accompanied by Angelika, for follow up on chronic health conditions/medication refills.     He fell the other day - Angelika states that he is shuffling his feet and not picking them up. He does not want to do PT. He has a cane at home that belongs to his father, but he doesn't want to use it. He states he will 'look like an old man'.     He is taking Tramadol for pain secondary to DISH and chronic musculoskeletal pain, in the setting of stage III CKD. He cannot take NSAIDs. He did not have effective relief of pain with tylenol alone. He has been to rheumatology and they basically told him that he did not need to go back. Tramadol is working well for his pain. He is taking 100mg BID. He took his last pill last night from his most recent RX.    He sees his nephrologist every three months via telehealth - not a video visit, but telephone visit.     Blood pressure is normotensive on exam today, 136/86 and he has not had his medications this AM. He takes quinapril at night and metoprolol in the morning. He denies any chest pain, palpitations, headaches, shortness of breath or LE edema. He does have orthostatic dizziness and saw cardiology and 'they run tests on me all day and couldn't find nothing wrong'.     He takes Lipitor and denies any myalgia with use.     He is taking metformin for his blood sugar - his sugar has been anywhere from 90s to 220. No polyuria, polydipsia, or polyphagia.     PHQ-2 Total Score: 0      Past Medical History:   Diagnosis Date   • CAD (coronary artery disease)    • Cataract    • Diabetes mellitus, type 2 (HCC)    • Essential hypertension 07/23/2020   • GERD (gastroesophageal reflux disease)    • Hyperlipidemia    • Kidney stones    • Peripheral arterial disease (HCC) 07/23/2020   • Seasonal  allergies        No Known Allergies     Past Surgical History:   Procedure Laterality Date   • CARDIAC SURGERY      BYPASS   • CAROTID ENDARTERECTOMY Right 08/2020   • FEMORAL ENDARTERECTOMY Bilateral 10/2020   • ILIAC ARTERY STENT Bilateral 10/2020        Social History     Tobacco Use   • Smoking status: Former     Packs/day: 2.00     Types: Cigarettes   • Smokeless tobacco: Never   • Tobacco comments:     SMOKES 1 TO 2 PACKS PER DAY, CURRENTLY USES OTHER TOBACCO PRODUCTS    Substance Use Topics   • Alcohol use: Yes     Comment: DRINKS ALCHOL, 8-14 DRINKS PER WEEK    • Drug use: Never       Family History   Problem Relation Age of Onset   • Arthritis Mother    • Alcohol abuse Father    • Asthma Sister    • COPD Sister    • Breast cancer Sister    • Diabetes type I Brother    • Stroke Brother         Health Maintenance Due   Topic Date Due   • ZOSTER VACCINE (2 of 3) 04/12/2010   • Pneumococcal Vaccine 65+ (2 - PPSV23 if available, else PCV20) 04/01/2018   • DIABETIC EYE EXAM  08/06/2021   • ANNUAL WELLNESS VISIT  01/27/2023        Current Outpatient Medications on File Prior to Visit   Medication Sig   • aspirin 81 MG EC tablet Take 81 mg by mouth Daily.   • multivitamin with minerals tablet tablet Take 1 tablet by mouth Daily.   • [DISCONTINUED] atorvastatin (LIPITOR) 20 MG tablet Take 1 tablet by mouth Every Night.   • [DISCONTINUED] cilostazol (PLETAL) 100 MG tablet Take 1 tablet by mouth 2 (Two) Times a Day.   • [DISCONTINUED] metFORMIN (Glucophage) 850 MG tablet Take 1 tablet by mouth Daily With Dinner.   • [DISCONTINUED] metoprolol tartrate (LOPRESSOR) 50 MG tablet Take 1 tablet by mouth Daily.   • [DISCONTINUED] quinapril (ACCUPRIL) 40 MG tablet Take 1 tablet by mouth Daily.   • [DISCONTINUED] traMADol (ULTRAM) 50 MG tablet Take 2 tablets by mouth 2 (Two) Times a Day As Needed for Moderate Pain.   • [DISCONTINUED] cephalexin (Keflex) 500 MG capsule Take 1 capsule by mouth 3 (Three) Times a Day.   •  "[DISCONTINUED] methocarbamol (Robaxin) 500 MG tablet Take 1 tablet by mouth 4 (Four) Times a Day As Needed for Muscle Spasms.   • [DISCONTINUED] mupirocin (BACTROBAN) 2 % ointment Apply 1 application topically to the appropriate area as directed 3 (Three) Times a Day.     No current facility-administered medications on file prior to visit.       Immunization History   Administered Date(s) Administered   • COVID-19 (MODERNA) 1st, 2nd, 3rd Dose Only 02/09/2021, 02/09/2021, 03/11/2021, 03/11/2021, 11/05/2021   • Fluad Quad 65+ 10/18/2021   • Fluzone High Dose =>65 Years ([employer] ONLY) 10/14/2020, 10/08/2022   • Fluzone High-Dose 65+yrs 10/14/2020   • Influenza, Unspecified 08/01/2017, 10/18/2021, 10/08/2022   • Pneumococcal Conjugate 13-Valent (PCV13) 04/01/2017   • Tdap 01/27/2022   • Zostavax 02/15/2010       Review of Systems     Objective     /86   Pulse 100   Temp 97.1 °F (36.2 °C)   Ht 172.7 cm (68\")   Wt 89.8 kg (198 lb)   SpO2 98%   BMI 30.11 kg/m²       Physical Exam  Vitals reviewed.   Constitutional:       General: He is not in acute distress.     Appearance: He is well-developed. He is obese.   HENT:      Head: Normocephalic and atraumatic.   Eyes:      General: No scleral icterus.     Extraocular Movements: Extraocular movements intact.      Conjunctiva/sclera: Conjunctivae normal.   Neck:      Thyroid: No thyroid mass, thyromegaly or thyroid tenderness.      Vascular: No carotid bruit.      Trachea: Trachea normal.   Cardiovascular:      Rate and Rhythm: Normal rate and regular rhythm.      Pulses: Normal pulses.      Heart sounds: Murmur heard.    Systolic murmur is present.  Pulmonary:      Effort: Pulmonary effort is normal. No respiratory distress.      Breath sounds: Normal breath sounds. No wheezing, rhonchi or rales.   Musculoskeletal:         General: Normal range of motion.      Cervical back: Normal range of motion and neck supple.      Right lower leg: Edema present.      Left lower " leg: Edema present.      Comments: Trace LE edema.    Lymphadenopathy:      Cervical: No cervical adenopathy.   Skin:     General: Skin is warm and dry.   Neurological:      Mental Status: He is alert and oriented to person, place, and time.      Gait: Gait abnormal.   Psychiatric:         Mood and Affect: Mood and affect normal.         Behavior: Behavior normal.         Thought Content: Thought content normal.         Judgment: Judgment normal.         Result Review :     The following data was reviewed by: RUSS Herrera on 02/09/2023:    CMP    CMP 7/26/22 11/8/22 11/14/22   Glucose 119 (A) 122 (A) 92   BUN 20 14 18   Creatinine 1.29 (A) 1.22 1.54 (A)   eGFR 56.1 (A) 59.9 (A) 45.3 (A)   Sodium 140 140 141   Potassium 5.5 (A) 5.1 4.8   Chloride 101 103 103   Calcium 9.8 9.4 9.9   Total Protein 6.5 6.6 6.9   Albumin 3.90 3.80 3.60   Globulin 2.6 2.8 3.3   Total Bilirubin 0.6 0.7 0.6   Alkaline Phosphatase 72 80 82   AST (SGOT) 20 25 20   ALT (SGPT) 20 21 19   Albumin/Globulin Ratio 1.5 1.4 1.1   BUN/Creatinine Ratio 15.5 11.5 11.7   Anion Gap 9.8 9.0 9.8   (A) Abnormal value       Comments are available for some flowsheets but are not being displayed.           CBC    CBC 7/26/22 11/8/22 11/14/22   WBC 7.66 7.46 7.36   RBC 3.88 (A) 3.91 (A) 3.99 (A)   Hemoglobin 13.4 13.4 13.4   Hematocrit 39.4 39.1 38.4   .5 (A) 100.0 (A) 96.2   MCH 34.5 (A) 34.3 (A) 33.6 (A)   MCHC 34.0 34.3 34.9   RDW 12.5 12.4 12.0 (A)   Platelets 263 281 284   (A) Abnormal value            Lipid Panel    Lipid Panel 11/8/22   Total Cholesterol 174   Triglycerides 90   HDL Cholesterol 70 (A)   VLDL Cholesterol 16   LDL Cholesterol  88   LDL/HDL Ratio 1.23   (A) Abnormal value            TSH    TSH 11/8/22   TSH 2.860           A1C Last 3 Results    HGBA1C Last 3 Results 11/8/22   Hemoglobin A1C 5.90 (A)   (A) Abnormal value            Microalbumin    Microalbumin 4/12/22 11/8/22   Microalbumin, Urine 12.3 <1.2           Data  reviewed: Cardiology studies :   Duplex Carotid Ultrasound CAR (05/03/2022 11:42)  Stress Test With Myocardial Perfusion One Day (05/17/2022 11:39)  SCANNED - HOLTER MONITOR (05/03/2022)  Adult Transthoracic Echo Complete W/ Cont if Necessary Per Protocol (05/03/2022 12:09)           Assessment and Plan      Diagnoses and all orders for this visit:    1. Type 2 diabetes mellitus with stage 3a chronic kidney disease, without long-term current use of insulin ([facility]) (Primary)  Comments:  Encouraged annual eye exam  Orders:  -     CBC Auto Differential  -     Comprehensive Metabolic Panel  -     Hemoglobin A1c  -     Lipid Panel  -     TSH+Free T4  -     Microalbumin / Creatinine Urine Ratio - Urine, Clean Catch  -     metFORMIN (Glucophage) 850 MG tablet; Take 1 tablet by mouth Daily With Dinner.  Dispense: 90 tablet; Refill: 1    2. Essential hypertension  -     CBC Auto Differential  -     Comprehensive Metabolic Panel  -     Lipid Panel  -     TSH+Free T4  -     Microalbumin / Creatinine Urine Ratio - Urine, Clean Catch  -     quinapril (ACCUPRIL) 40 MG tablet; Take 1 tablet by mouth Daily.  Dispense: 90 tablet; Refill: 1  -     metoprolol tartrate (LOPRESSOR) 50 MG tablet; Take 1 tablet by mouth Daily.  Dispense: 90 tablet; Refill: 1    3. Hyperlipidemia, unspecified hyperlipidemia type  -     Comprehensive Metabolic Panel  -     Lipid Panel  -     atorvastatin (LIPITOR) 20 MG tablet; Take 1 tablet by mouth Every Night.  Dispense: 90 tablet; Refill: 1    4. Peripheral vascular disease with claudication (HCC)  -     cilostazol (PLETAL) 100 MG tablet; Take 1 tablet by mouth 2 (Two) Times a Day.  Dispense: 180 tablet; Refill: 1    5. DISH (diffuse idiopathic skeletal hyperostosis)  -     traMADol (ULTRAM) 50 MG tablet; Take 2 tablets by mouth 2 (Two) Times a Day As Needed for Moderate Pain.  Dispense: 120 tablet; Refill: 2    6. Musculoskeletal pain  -     traMADol (ULTRAM) 50 MG tablet; Take 2 tablets by mouth 2 (Two)  Times a Day As Needed for Moderate Pain.  Dispense: 120 tablet; Refill: 2  -     methocarbamol (Robaxin) 500 MG tablet; Take 1 tablet by mouth 4 (Four) Times a Day As Needed for Muscle Spasms.  Dispense: 180 tablet; Refill: 1    7. Stage 3a chronic kidney disease (HCC)  -     traMADol (ULTRAM) 50 MG tablet; Take 2 tablets by mouth 2 (Two) Times a Day As Needed for Moderate Pain.  Dispense: 120 tablet; Refill: 2    8. High risk medication use  -     traMADol (ULTRAM) 50 MG tablet; Take 2 tablets by mouth 2 (Two) Times a Day As Needed for Moderate Pain.  Dispense: 120 tablet; Refill: 2    9. Prostate cancer screening  -     PSA Screen    10. At high risk for falls  Comments:  Encouraged use of cane for falls prevention. Declined PT referral.             Follow Up     Return in about 3 months (around 5/9/2023) for Medicare Wellness AND REFILL PAIN MEDICATION.    Patient was given instructions and counseling regarding his condition or for health maintenance advice. Please see specific information pulled into the AVS if appropriate.

## 2023-02-15 ENCOUNTER — TELEPHONE (OUTPATIENT)
Dept: FAMILY MEDICINE CLINIC | Facility: CLINIC | Age: 83
End: 2023-02-15
Payer: MEDICARE

## 2023-02-15 DIAGNOSIS — I10 ESSENTIAL HYPERTENSION: ICD-10-CM

## 2023-02-15 RX ORDER — METOPROLOL TARTRATE 50 MG/1
50 TABLET, FILM COATED ORAL DAILY
Qty: 90 TABLET | Refills: 1 | Status: SHIPPED | OUTPATIENT
Start: 2023-02-15

## 2023-02-15 NOTE — TELEPHONE ENCOUNTER
Caller: [facility] - [place], [place] - [address] [address] - [phone] [facility] [phone] FX    Relationship: Pharmacy    Best call back number: [phone]    REFERENCE NUMBER: [id number]    Requested Prescriptions:   Requested Prescriptions     Pending Prescriptions Disp Refills   • metoprolol tartrate (LOPRESSOR) 50 MG tablet 90 tablet 1     Sig: Take 1 tablet by mouth Daily.        Pharmacy where request should be sent: [facility] - [place], [place] - [address] [place] [address] COURT - [phone] [facility] [phone] FX     Does the patient have less than a 3 day supply:  [] Yes  [x] No    Would you like a call back once the refill request has been completed: [] Yes [x] No    If the office needs to give you a call back, can they leave a voicemail: [] Yes [x] No    Maria Dolores Daniel Rep   02/15/23 14:15 EST

## 2023-03-13 ENCOUNTER — TELEPHONE (OUTPATIENT)
Dept: FAMILY MEDICINE CLINIC | Facility: CLINIC | Age: 83
End: 2023-03-13
Payer: MEDICARE

## 2023-03-13 ENCOUNTER — TELEPHONE (OUTPATIENT)
Dept: FAMILY MEDICINE CLINIC | Facility: CLINIC | Age: 83
End: 2023-03-13

## 2023-03-13 DIAGNOSIS — I10 ESSENTIAL HYPERTENSION: Primary | ICD-10-CM

## 2023-03-13 DIAGNOSIS — I10 ESSENTIAL HYPERTENSION: ICD-10-CM

## 2023-03-13 RX ORDER — QUINAPRIL 40 MG/1
40 TABLET ORAL DAILY
Qty: 90 TABLET | Refills: 1 | OUTPATIENT
Start: 2023-03-13

## 2023-03-13 RX ORDER — QUINAPRIL 40 MG/1
40 TABLET ORAL DAILY
Qty: 7 TABLET | Refills: 0 | Status: SHIPPED | OUTPATIENT
Start: 2023-03-13 | End: 2023-03-14

## 2023-03-13 NOTE — TELEPHONE ENCOUNTER
Caller: JOSE CRUZ SMITH    Relationship: Emergency Contact    Best call back number: [phone]     Who are you requesting to speak with (clinical staff, provider,  specific staff member): RUSS GOMEZ    What was the call regarding: JOSE CRUZ WOULD LIKE A CALL IN THE MORNING FROM RUSS GOMEZ TO DISCUSS BLOOD PRESSURE MEDICATION FOR THE PATIENT. HE IS COMPLETELY OUT AND THE QUINAPRIL IS ON BACK ORDER.. PLEASE CALL PATIENT TO ADVISE.

## 2023-03-13 NOTE — TELEPHONE ENCOUNTER
Patient's wife called requesting a short term refill on quinapril. Quinapril 40mg has been messed up with mail order, they never got this but mail order said that they needed provider to OK it. I told her that it was sent over a month ago with all the others. She said they believe they got two bottles of one medication and never got the quinapril. They are wanting a 7 day supply sent to [facility], while they wait to see what happens with the mail order rx. I gave confirmation date/time of the medication. If they felt the pharmacy sent wrong medication they would have to discuss with pharmacy. She is aware.

## 2023-03-13 NOTE — TELEPHONE ENCOUNTER
Caller: JOSE CRUZ SMITH    Relationship: Emergency Contact    Best call back number: [phone]    Requested Prescriptions:   Requested Prescriptions     Pending Prescriptions Disp Refills   • quinapril (ACCUPRIL) 40 MG tablet 90 tablet 1     Sig: Take 1 tablet by mouth Daily.      [id number] [address] [facility] AT [facility] [phone] [facility] [phone] FX     Does the patient have less than a 3 day supply:  [x] Yes  [] No    Would you like a call back once the refill request has been completed: [x] Yes [] No    If the office needs to give you a call back, can they leave a voicemail: [x] Yes [] No    Maria Dolores Conley Rep   03/13/23 08:34 EDT

## 2023-03-14 RX ORDER — LISINOPRIL 40 MG/1
40 TABLET ORAL DAILY
Qty: 7 TABLET | Refills: 0 | Status: SHIPPED | OUTPATIENT
Start: 2023-03-14 | End: 2023-06-16

## 2023-03-14 RX ORDER — LISINOPRIL 40 MG/1
40 TABLET ORAL DAILY
Qty: 90 TABLET | Refills: 0 | Status: SHIPPED | OUTPATIENT
Start: 2023-03-14 | End: 2023-03-14 | Stop reason: SDUPTHER

## 2023-03-21 ENCOUNTER — LAB (OUTPATIENT)
Dept: LAB | Facility: HOSPITAL | Age: 83
End: 2023-03-21
Payer: MEDICARE

## 2023-03-21 ENCOUNTER — TRANSCRIBE ORDERS (OUTPATIENT)
Dept: LAB | Facility: HOSPITAL | Age: 83
End: 2023-03-21
Payer: MEDICARE

## 2023-03-21 DIAGNOSIS — N18.30 STAGE 3 CHRONIC KIDNEY DISEASE, UNSPECIFIED WHETHER STAGE 3A OR 3B CKD: Primary | ICD-10-CM

## 2023-03-21 DIAGNOSIS — E11.9 DIABETES MELLITUS WITHOUT COMPLICATION: ICD-10-CM

## 2023-03-21 DIAGNOSIS — I10 ESSENTIAL HYPERTENSION, MALIGNANT: ICD-10-CM

## 2023-03-21 DIAGNOSIS — N18.30 STAGE 3 CHRONIC KIDNEY DISEASE, UNSPECIFIED WHETHER STAGE 3A OR 3B CKD: ICD-10-CM

## 2023-03-21 LAB
ALBUMIN SERPL-MCNC: 4 G/DL (ref 3.5–5.2)
ALBUMIN/GLOB SERPL: 1.4 G/DL
ALP SERPL-CCNC: 74 U/L (ref 39–117)
ALT SERPL W P-5'-P-CCNC: 24 U/L (ref 1–41)
ANION GAP SERPL CALCULATED.3IONS-SCNC: 11 MMOL/L (ref 5–15)
AST SERPL-CCNC: 24 U/L (ref 1–40)
BILIRUB SERPL-MCNC: 0.7 MG/DL (ref 0–1.2)
BUN SERPL-MCNC: 20 MG/DL (ref 8–23)
BUN/CREAT SERPL: 13.8 (ref 7–25)
CALCIUM SPEC-SCNC: 9.6 MG/DL (ref 8.6–10.5)
CHLORIDE SERPL-SCNC: 101 MMOL/L (ref 98–107)
CO2 SERPL-SCNC: 28 MMOL/L (ref 22–29)
CREAT SERPL-MCNC: 1.45 MG/DL (ref 0.76–1.27)
EGFRCR SERPLBLD CKD-EPI 2021: 48.4 ML/MIN/1.73
GLOBULIN UR ELPH-MCNC: 2.8 GM/DL
GLUCOSE SERPL-MCNC: 102 MG/DL (ref 65–99)
HBA1C MFR BLD: 6 % (ref 4.8–5.6)
POTASSIUM SERPL-SCNC: 4.7 MMOL/L (ref 3.5–5.2)
PROT SERPL-MCNC: 6.8 G/DL (ref 6–8.5)
SODIUM SERPL-SCNC: 140 MMOL/L (ref 136–145)

## 2023-03-21 PROCEDURE — 83036 HEMOGLOBIN GLYCOSYLATED A1C: CPT

## 2023-03-21 PROCEDURE — 36415 COLL VENOUS BLD VENIPUNCTURE: CPT

## 2023-03-21 PROCEDURE — 80053 COMPREHEN METABOLIC PANEL: CPT

## 2023-05-08 DIAGNOSIS — N18.31 STAGE 3A CHRONIC KIDNEY DISEASE: ICD-10-CM

## 2023-05-08 DIAGNOSIS — M79.18 MUSCULOSKELETAL PAIN: ICD-10-CM

## 2023-05-08 DIAGNOSIS — M48.10 DISH (DIFFUSE IDIOPATHIC SKELETAL HYPEROSTOSIS): ICD-10-CM

## 2023-05-08 DIAGNOSIS — Z79.899 HIGH RISK MEDICATION USE: ICD-10-CM

## 2023-05-08 RX ORDER — TRAMADOL HYDROCHLORIDE 50 MG/1
100 TABLET ORAL 2 TIMES DAILY PRN
Qty: 120 TABLET | Refills: 0 | Status: SHIPPED | OUTPATIENT
Start: 2023-05-08

## 2023-05-08 NOTE — TELEPHONE ENCOUNTER
Caller: JOSE CRUZ SMITH    Relationship: Emergency Contact    Best call back number: [phone]    Requested Prescriptions:   Requested Prescriptions     Pending Prescriptions Disp Refills   • traMADol (ULTRAM) 50 MG tablet 120 tablet 2     Sig: Take 2 tablets by mouth 2 (Two) Times a Day As Needed for Moderate Pain.        Pharmacy where request should be sent: [id number] - [place], [place] - [address]  AT [facility] - [phone] [facility] [phone] FX     Last office visit with prescribing clinician: 2/9/2023   Last telemedicine visit with prescribing clinician: 5/30/2023   Next office visit with prescribing clinician: 5/30/2023         Does the patient have less than a 3 day supply:  [x] Yes  [] No    Would you like a call back once the refill request has been completed: [] Yes [x] No    If the office needs to give you a call back, can they leave a voicemail: [] Yes [x] No    Maria Dolores Price Rep   05/08/23 11:30 EDT

## 2023-05-30 ENCOUNTER — OFFICE VISIT (OUTPATIENT)
Dept: FAMILY MEDICINE CLINIC | Facility: CLINIC | Age: 83
End: 2023-05-30

## 2023-05-30 VITALS
HEART RATE: 80 BPM | DIASTOLIC BLOOD PRESSURE: 74 MMHG | TEMPERATURE: 97.4 F | OXYGEN SATURATION: 98 % | BODY MASS INDEX: 30.07 KG/M2 | WEIGHT: 203 LBS | HEIGHT: 69 IN | SYSTOLIC BLOOD PRESSURE: 124 MMHG

## 2023-05-30 DIAGNOSIS — Z71.85 VACCINE COUNSELING: ICD-10-CM

## 2023-05-30 DIAGNOSIS — E66.9 CLASS 1 OBESITY WITH SERIOUS COMORBIDITY AND BODY MASS INDEX (BMI) OF 30.0 TO 30.9 IN ADULT, UNSPECIFIED OBESITY TYPE: ICD-10-CM

## 2023-05-30 DIAGNOSIS — Z91.81 AT HIGH RISK FOR FALLS: ICD-10-CM

## 2023-05-30 DIAGNOSIS — Z00.00 MEDICARE ANNUAL WELLNESS VISIT, SUBSEQUENT: Primary | ICD-10-CM

## 2023-05-30 DIAGNOSIS — Z23 NEED FOR VACCINATION WITH 20-POLYVALENT PNEUMOCOCCAL CONJUGATE VACCINE: ICD-10-CM

## 2023-05-30 PROCEDURE — G0439 PPPS, SUBSEQ VISIT: HCPCS | Performed by: NURSE PRACTITIONER

## 2023-05-30 PROCEDURE — 1170F FXNL STATUS ASSESSED: CPT | Performed by: NURSE PRACTITIONER

## 2023-05-30 PROCEDURE — 90677 PCV20 VACCINE IM: CPT | Performed by: NURSE PRACTITIONER

## 2023-05-30 PROCEDURE — 96372 THER/PROPH/DIAG INJ SC/IM: CPT | Performed by: NURSE PRACTITIONER

## 2023-05-30 PROCEDURE — 3078F DIAST BP <80 MM HG: CPT | Performed by: NURSE PRACTITIONER

## 2023-05-30 PROCEDURE — 3074F SYST BP LT 130 MM HG: CPT | Performed by: NURSE PRACTITIONER

## 2023-05-30 NOTE — PATIENT INSTRUCTIONS
Fall Prevention in the Home, Adult  Falls can cause injuries and affect people of all ages. There are many simple things that you can do to make your home safe and to help prevent falls. Ask for help when making these changes, if needed.  What actions can I take to prevent falls?  General instructions  • Use good lighting in all rooms. Replace any light bulbs that burn out, turn on lights if it is dark, and use night-lights.  • Place frequently used items in easy-to-reach places. Lower the shelves around your home if necessary.  • Set up furniture so that there are clear paths around it. Avoid moving your furniture around.  • Remove throw rugs and other tripping hazards from the floor.  • Avoid walking on wet floors.  • Fix any uneven floor surfaces.  • Add color or contrast paint or tape to grab bars and handrails in your home. Place contrasting color strips on the first and last steps of staircases.  • When you use a stepladder, make sure that it is completely opened and that the sides and supports are firmly locked. Have someone hold the ladder while you are using it. Do not climb a closed stepladder.  • Know where your pets are when moving through your home.  What can I do in the bathroom?     • Keep the floor dry. Immediately clean up any water that is on the floor.  • Remove soap buildup in the tub or shower regularly.  • Use nonskid mats or decals on the floor of the tub or shower.  • Attach bath mats securely with double-sided, nonslip rug tape.  • If you need to sit down while you are in the shower, use a plastic, nonslip stool.  • Install grab bars by the toilet and in the tub and shower. Do not use towel bars as grab bars.  What can I do in the bedroom?  • Make sure that a bedside light is easy to reach.  • Do not use oversized bedding that reaches the floor.  • Have a firm chair that has side arms to use for getting dressed.  What can I do in the kitchen?  • Clean up any spills right away.  • If you  need to reach for something above you, use a sturdy step stool that has a grab bar.  • Keep electrical cables out of the way.  • Do not use floor polish or wax that makes floors slippery. If you must use wax, make sure that it is non-skid floor wax.  What can I do with my stairs?  • Do not leave any items on the stairs.  • Make sure that you have a light switch at the top and the bottom of the stairs. Have them installed if you do not have them.  • Make sure that there are handrails on both sides of the stairs. Fix handrails that are broken or loose. Make sure that handrails are as long as the staircases.  • Install non-slip stair treads on all stairs in your home.  • Avoid having throw rugs at the top or bottom of stairs, or secure the rugs with carpet tape to prevent them from moving.  • Choose a carpet design that does not hide the edge of steps on the stairs.  • Check any carpeting to make sure that it is firmly attached to the stairs. Fix any carpet that is loose or worn.  What can I do on the outside of my home?  • Use bright outdoor lighting.  • Regularly repair the edges of walkways and driveways and fix any cracks.  • Remove high doorway thresholds.  • Trim any shrubbery on the main path into your home.  • Regularly check that handrails are securely fastened and in good repair. Both sides of all steps should have handrails.  • Install guardrails along the edges of any raised decks or porches.  • Clear walkways of debris and clutter, including tools and rocks.  • Have leaves, snow, and ice cleared regularly.  • Use sand or salt on walkways during winter months.  • In the garage, clean up any spills right away, including grease or oil spills.  What other actions can I take?  • Wear closed-toe shoes that fit well and support your feet. Wear shoes that have rubber soles or low heels.  • Use mobility aids as needed, such as canes, walkers, scooters, and crutches.  • Review your medicines with your health care  provider. Some medicines can cause dizziness or changes in blood pressure, which increase your risk of falling.  Talk with your health care provider about other ways that you can decrease your risk of falls. This may include working with a physical therapist or  to improve your strength, balance, and endurance.  Where to find more information  • Centers for Disease Control and Prevention, [place]: www.cdc.gov  • National [place] on Aging: www.prabhu.nih.gov  Contact a health care provider if:  • You are afraid of falling at home.  • You feel weak, drowsy, or dizzy at home.  • You fall at home.  Summary  • There are many simple things that you can do to make your home safe and to help prevent falls.  • Ways to make your home safe include removing tripping hazards and installing grab bars in the bathroom.  • Ask for help when making these changes in your home.  This information is not intended to replace advice given to you by your health care provider. Make sure you discuss any questions you have with your health care provider.  Document Revised: 09/19/2022 Document Reviewed: 07/21/2021  [employer] Patient Education © 2022 [employer]      Sit-to-Stand Exercise  The sit-to-stand exercise (also known as the chair stand or chair rise exercise) strengthens your lower body and helps you maintain or improve your mobility and independence. The end goal is to do the sit-to-stand exercise without using your hands. This will be easier as you become stronger. You should always talk with your health care provider before starting any exercise program, especially if you have had recent surgery.  Do the exercise exactly as told by your health care provider and adjust it as directed. It is normal to feel mild stretching, pulling, tightness, or discomfort as you do this exercise, but you should stop right away if you feel sudden pain or your pain gets worse. Do not begin doing this exercise until told by your health care  provider.  What the sit-to-stand exercise does  The sit-to-stand exercise helps to strengthen the muscles in your thighs and the muscles in the center of your body that give you stability (core muscles). This exercise is especially helpful if:  • You have had knee or hip surgery.  • You have trouble getting up from a chair, out of a car, or off the toilet due to muscle weakness.  How to do the sit-to-stand exercise  1. Sit toward the front edge of a sturdy chair without armrests. Your knees should be bent and your feet should be flat on the floor and shoulder-width apart and underneath your hips.  2. Place your hands lightly on each side of the seat. Keep your back and neck as straight as possible, with your chest slightly forward.  3. Breathe in slowly. Lean forward and slightly shift your weight to the front of your feet.  4. Breathe out as you slowly stand up. Try not to support any weight with your hands.  5. Stand and pause for a full breath in and out.  6. Breathe in as you sit down slowly. Tighten your core and abdominal muscles to control your lowering as much as possible. You should lower yourself back to the chair slowly, not just drop back into the seat.  7. Breathe out slowly.  8. Do this exercise 10-15 times. If needed, do it fewer times until you build up strength.  9. Rest for 1 minute, then do another set of 10-15 repetitions.  To change the difficulty of the sit-to-stand exercise  • If the exercise is too difficult, use a chair with sturdy armrests, and push off the armrests to help you come to the standing position. You can also use the armrests to help slowly lower yourself back to sitting. As this gets easier, try to use your arms less. You can also place a firm cushion or pillow on the chair to make the surface higher.  • If this exercise is too easy, do not use your arms to help raise or lower yourself. You can also wear a weighted vest, use hand weights, increase your repetitions, or try a  lower chair.  General tips  • You may feel tired when starting an exercise routine. This is normal.  • You may have muscle soreness that lasts a few days. This is normal. As you get stronger, you may not feel muscle soreness.  • Use smooth, steady movements.  • Do not  hold your breath during strength exercises. This can cause unsafe changes in your blood pressure.  • Breathe in slowly through your nose, and breathe out slowly through your mouth.  Summary  • Strengthening your lower body is an important step to help you move safely and independently.  • The sit-to-stand exercise helps strengthen the muscles in your thighs and core.  • You should always talk with your health care provider before starting any exercise program, especially if you have had recent surgery.  This information is not intended to replace advice given to you by your health care provider. Make sure you discuss any questions you have with your health care provider.  Document Revised: 04/10/2022 Document Reviewed: 04/10/2022  [employer] Patient Education © 2022 [employer]      Exercising to Stay Healthy  To become healthy and stay healthy, it is recommended that you do moderate-intensity and vigorous-intensity exercise. You can tell that you are exercising at a moderate intensity if your heart starts beating faster and you start breathing faster but can still hold a conversation. You can tell that you are exercising at a vigorous intensity if you are breathing much harder and faster and cannot hold a conversation while exercising.  How can exercise benefit me?  Exercising regularly is important. It has many health benefits, such as:  • Improving overall fitness, flexibility, and endurance.  • Increasing bone density.  • Helping with weight control.  • Decreasing body fat.  • Increasing muscle strength and endurance.  • Reducing stress and tension, anxiety, depression, or anger.  • Improving overall health.  What guidelines should I follow while  exercising?  • Before you start a new exercise program, talk with your health care provider.  • Do not exercise so much that you hurt yourself, feel dizzy, or get very short of breath.  • Wear comfortable clothes and wear shoes with good support.  • Drink plenty of water while you exercise to prevent dehydration or heat stroke.  • Work out until your breathing and your heartbeat get faster (moderate intensity).  How often should I exercise?  Choose an activity that you enjoy, and set realistic goals. Your health care provider can help you make an activity plan that is individually designed and works best for you.  Exercise regularly as told by your health care provider. This may include:  • Doing strength training two times a week, such as:  ? Lifting weights.  ? Using resistance bands.  ? Push-ups.  ? Sit-ups.  ? Yoga.  • Doing a certain intensity of exercise for a given amount of time. Choose from these options:  ? A total of 150 minutes of moderate-intensity exercise every week.  ? A total of 75 minutes of vigorous-intensity exercise every week.  ? A mix of moderate-intensity and vigorous-intensity exercise every week.  Children, pregnant women, people who have not exercised regularly, people who are overweight, and older adults may need to talk with a health care provider about what activities are safe to perform. If you have a medical condition, be sure to talk with your health care provider before you start a new exercise program.  What are some exercise ideas?  Moderate-intensity exercise ideas include:  • Walking 1 mile (1.6 km) in about 15 minutes.  • Biking.  • Hiking.  • Golfing.  • Dancing.  • Water aerobics.  Vigorous-intensity exercise ideas include:  • Walking 4.5 miles (7.2 km) or more in about 1 hour.  • Jogging or running 5 miles (8 km) in about 1 hour.  • Biking 10 miles (16.1 km) or more in about 1 hour.  • Lap swimming.  • Roller-skating or in-line skating.  • Cross-country skiing.  • Vigorous  competitive sports, such as football, basketball, and soccer.  • Jumping rope.  • Aerobic dancing.  What are some everyday activities that can help me get exercise?  • Yard work, such as:  ? Pushing a .  ? Raking and bagging leaves.  • Washing your car.  • Pushing a stroller.  • Shoveling snow.  • Gardening.  • Washing windows or floors.  How can I be more active in my day-to-day activities?  • Use stairs instead of an elevator.  • Take a walk during your lunch break.  • If you drive, park your car farther away from your work or school.  • If you take public transportation, get off one stop early and walk the rest of the way.  • Stand up or walk around during all of your indoor phone calls.  • Get up, stretch, and walk around every 30 minutes throughout the day.  • Enjoy exercise with a friend. Support to continue exercising will help you keep a regular routine of activity.  Where to find more information  You can find more information about exercising to stay healthy from:  • [place] Department of Health and Human Services: www.hhs.gov  • Centers for Disease Control and Prevention (CDC): www.cdc.gov  Summary  • Exercising regularly is important. It will improve your overall fitness, flexibility, and endurance.  • Regular exercise will also improve your overall health. It can help you control your weight, reduce stress, and improve your bone density.  • Do not exercise so much that you hurt yourself, feel dizzy, or get very short of breath.  • Before you start a new exercise program, talk with your health care provider.  This information is not intended to replace advice given to you by your health care provider. Make sure you discuss any questions you have with your health care provider.  Document Revised: 04/15/2022 Document Reviewed: 04/15/2022  [employer] Patient Education © 2022 [employer]

## 2023-05-30 NOTE — PROGRESS NOTES
The ABCs of the Annual Wellness Visit  Subsequent Medicare Wellness Visit    Subjective         Rudy Moore is a 82 y.o. male who presents for a Subsequent Medicare Wellness Visit.    The following portions of the patient's history were reviewed and updated as appropriate: allergies, current medications, past family history, past medical history, past social history, past surgical history and problem list.    Compared to one year ago, the patient feels his physical health is worse. He states that he staggers around a little bit more than he used - he won't use a cane or walker - her has a cane, and he does think it would help some, but he doesn't wan to use it. He is taking pain medication for management of pain r/t DISH. Currently pain is stable with Tramadol 100mg BID.     Compared to one year ago, the patient feels his mental health is the same. He does notice that he is having a harder time remembering people's names - but he denies any issues with driving, remembering anniversaries or birthdays, and he still manages his own money.     Recent Hospitalizations:  He was not admitted to the hospital during the last year.       Current Medical Providers:  Patient Care Team:  Kim Dailey APRN as PCP - General (Nurse Practitioner)  Renee Cadena MD as Consulting Physician (Nephrology)  Mikaela Hernandez MD as Consulting Physician (Rheumatology)    Outpatient Medications Prior to Visit   Medication Sig Dispense Refill   • aspirin 81 MG EC tablet Take 1 tablet by mouth Daily.     • atorvastatin (LIPITOR) 20 MG tablet Take 1 tablet by mouth Every Night. 90 tablet 1   • cilostazol (PLETAL) 100 MG tablet Take 1 tablet by mouth 2 (Two) Times a Day. 180 tablet 1   • lisinopril (PRINIVIL,ZESTRIL) 40 MG tablet Take 1 tablet by mouth Daily. 7 tablet 0   • metFORMIN (Glucophage) 850 MG tablet Take 1 tablet by mouth Daily With Dinner. 90 tablet 1   • methocarbamol (Robaxin) 500 MG tablet Take 1  "tablet by mouth 4 (Four) Times a Day As Needed for Muscle Spasms. 180 tablet 1   • metoprolol tartrate (LOPRESSOR) 50 MG tablet Take 1 tablet by mouth Daily. 90 tablet 1   • multivitamin with minerals tablet tablet Take 1 tablet by mouth Daily.     • traMADol (ULTRAM) 50 MG tablet Take 2 tablets by mouth 2 (Two) Times a Day As Needed for Moderate Pain. 120 tablet 0     No facility-administered medications prior to visit.       Opioid medication/s are on active medication list.  and I have evaluated his active treatment plan and pain score trends (see table).  There were no vitals filed for this visit.  I have reviewed the chart for potential of high risk medication and harmful drug interactions in the elderly.            Aspirin is on active medication list. Aspirin use is indicated based on review of current medical condition/s. Pros and cons of this therapy have been discussed today. Benefits of this medication outweigh potential harm.  Patient has been encouraged to continue taking this medication.        Patient Active Problem List   Diagnosis   • BPH (benign prostatic hyperplasia)   • Essential hypertension   • GERD (gastroesophageal reflux disease)   • Stage 3a chronic kidney disease   • Type 2 diabetes mellitus without complication   • Coronary artery disease involving native coronary artery of native heart without angina pectoris   • History of coronary angioplasty with insertion of stent     Advance Care Planning   Advance Care Planning     Advance Directive is not on file.  ACP discussion was declined by the patient. Patient does not have an advance directive, declines further assistance.     Objective    Vitals:    05/30/23 1047   BP: 124/74   Pulse: 80   Temp: 97.4 °F (36.3 °C)   SpO2: 98%   Weight: 92.1 kg (203 lb)   Height: 174 cm (68.5\")     Estimated body mass index is 30.42 kg/m² as calculated from the following:    Height as of this encounter: 174 cm (68.5\").    Weight as of this encounter: 92.1 kg " (203 lb).    BMI is >= 30 and <35. (Class 1 Obesity). The following options were offered after discussion;: weight loss educational material (shared in after visit summary), exercise counseling/recommendations and nutrition counseling/recommendations      Does the patient have evidence of cognitive impairment? No    Lab Results   Component Value Date    HGBA1C 6.00 (H) 2023          HEALTH RISK ASSESSMENT    Smoking Status:  Social History     Tobacco Use   Smoking Status Former   • Packs/day: 2.00   • Years: 42.00   • Pack years: 84.00   • Types: Cigarettes   • Start date:    • Quit date:    • Years since quittin.4   Smokeless Tobacco Never   Tobacco Comments    SMOKES 1 TO 2 PACKS PER DAY, CURRENTLY USES OTHER TOBACCO PRODUCTS      Alcohol Consumption:  Social History     Substance and Sexual Activity   Alcohol Use Yes    Comment: DRINKS ALCHOL, 8-14 DRINKS PER WEEK    He drinks 2-3 beers each afternoon - he states he doesn't drink anymore than that. He usually has a drink after 5PM - it takes him 2-3 hours to drink his beers. He doesn't drink during the day at all; only water.     Fall Risk Screen:    [facility], and patient is at HIGH risk for falls. Assessment completed on:2023    Depression Screenin/30/2023    10:46 AM   PHQ-2/PHQ-9 Depression Screening   Little Interest or Pleasure in Doing Things 0-->not at all   Feeling Down, Depressed or Hopeless 0-->not at all   PHQ-9: Brief Depression Severity Measure Score 0       Health Habits and Functional and Cognitive Screenin/30/2023    10:44 AM   Functional & Cognitive Status   Do you have difficulty preparing food and eating? No   Do you have difficulty bathing yourself, getting dressed or grooming yourself? No   Do you have difficulty using the toilet? No   Do you have difficulty moving around from place to place? No   Do you have trouble with steps or getting out of a bed or a chair? No    Current Diet Well Balanced Diet   Dental Exam Up to date   Eye Exam Up to date   Exercise (times per week) 0 times per week   Current Exercises Include No Regular Exercise   Do you need help using the phone?  No   Are you deaf or do you have serious difficulty hearing?  Yes   Do you need help with transportation? No   Do you need help shopping? No   Do you need help preparing meals?  No   Do you need help with housework?  No   Do you need help with laundry? No   Do you need help taking your medications? No   Do you need help managing money? No   Do you ever drive or ride in a car without wearing a seat belt? No   Have you felt unusual stress, anger or loneliness in the last month? No   Who do you live with? Spouse   If you need help, do you have trouble finding someone available to you? No   Have you been bothered in the last four weeks by sexual problems? No   Do you have difficulty concentrating, remembering or making decisions? Yes       Age-appropriate Screening Schedule:  Refer to the list below for future screening recommendations based on patient's age, sex and/or medical conditions. Orders for these recommended tests are listed in the plan section. The patient has been provided with a written plan.    Health Maintenance   Topic Date Due   • ZOSTER VACCINE (2 of 3) 04/12/2010   • Pneumococcal Vaccine 65+ (2 - PPSV23 if available, else PCV20) 04/01/2018   • DIABETIC EYE EXAM  08/06/2021   • COVID-19 Vaccine (6 - Booster for Moderna series) 12/31/2021   • INFLUENZA VACCINE  08/01/2023   • HEMOGLOBIN A1C  09/21/2023   • LIPID PANEL  02/09/2024   • URINE MICROALBUMIN  02/09/2024   • ANNUAL WELLNESS VISIT  05/30/2024   • TDAP/TD VACCINES (2 - Td or Tdap) 01/27/2032                  CMS Preventative Services Quick Reference  Risk Factors Identified During Encounter:    Alcohol Misuse: Patient encouraged to limit alcohol use to no more than 1 standard alcoholic beverage per day. (12 ounce beer, 6 ounce wine, one  shot liquor)  Chronic Pain: Pain medication as prescribed - currently working well/tolerating well -  Tramadol 100mg BID  Fall Risk-High or Moderate: Discussed Fall Prevention in the home, Information on Fall Prevention Shared in After Visit Summary and Sit to Stand Exercise Information Shared in After Visit Summary  Hearing Problem: declined audiology referral  Immunizations Discussed/Encouraged: Prevnar 20 (Pneumococcal 20-valent conjugate), Shingrix and COVID19  Dental Screening Recommended  Vision Screening Recommended    The above risks/problems have been discussed with the patient.  Pertinent information has been shared with the patient in the After Visit Summary.    Diagnoses and all orders for this visit:    1. Medicare annual wellness visit, subsequent (Primary)    2. Class 1 obesity with serious comorbidity and body mass index (BMI) of 30.0 to 30.9 in adult, unspecified obesity type  Comments:  Healthy diet - low fat/low salt/low carbohydrate - Exercise 20-30 minutes per day as tolerated d/t pain and activity level.     3. At high risk for falls  Comments:  Encouraged use of cane - monitor for dizziness/gait changes    4. Vaccine counseling  Comments:  Recommended: [id number] and Shingrix, as well as COVID-19 booster    5. Need for vaccination with 20-polyvalent pneumococcal conjugate vaccine  -     Pneumococcal Conjugate Vaccine 20-Valent (PCV20)        Follow Up:   Next Medicare Wellness visit to be scheduled in 1 year.      Return in about 3 months (around 8/30/2023) for Next scheduled follow up, medication refills and fasting labs.    An After Visit Summary and PPPS were made available to the patient.

## 2023-06-08 DIAGNOSIS — M79.18 MUSCULOSKELETAL PAIN: ICD-10-CM

## 2023-06-08 DIAGNOSIS — N18.31 STAGE 3A CHRONIC KIDNEY DISEASE: ICD-10-CM

## 2023-06-08 DIAGNOSIS — Z79.899 HIGH RISK MEDICATION USE: ICD-10-CM

## 2023-06-08 DIAGNOSIS — M48.10 DISH (DIFFUSE IDIOPATHIC SKELETAL HYPEROSTOSIS): ICD-10-CM

## 2023-06-08 RX ORDER — TRAMADOL HYDROCHLORIDE 50 MG/1
TABLET ORAL
Qty: 120 TABLET | Refills: 2 | Status: SHIPPED | OUTPATIENT
Start: 2023-06-08

## 2023-06-16 DIAGNOSIS — I10 ESSENTIAL HYPERTENSION: ICD-10-CM

## 2023-06-16 RX ORDER — LISINOPRIL 40 MG/1
TABLET ORAL
Qty: 90 TABLET | Refills: 0 | Status: SHIPPED | OUTPATIENT
Start: 2023-06-16

## 2023-07-24 ENCOUNTER — TRANSCRIBE ORDERS (OUTPATIENT)
Dept: ADMINISTRATIVE | Facility: HOSPITAL | Age: 83
End: 2023-07-24
Payer: MEDICARE

## 2023-07-24 ENCOUNTER — LAB (OUTPATIENT)
Dept: LAB | Facility: HOSPITAL | Age: 83
End: 2023-07-24
Payer: MEDICARE

## 2023-07-24 DIAGNOSIS — I10 ESSENTIAL HYPERTENSION, MALIGNANT: ICD-10-CM

## 2023-07-24 DIAGNOSIS — E11.9 DIABETES MELLITUS WITHOUT COMPLICATION: ICD-10-CM

## 2023-07-24 DIAGNOSIS — N18.30 STAGE 3 CHRONIC KIDNEY DISEASE, UNSPECIFIED WHETHER STAGE 3A OR 3B CKD: ICD-10-CM

## 2023-07-24 DIAGNOSIS — N18.30 STAGE 3 CHRONIC KIDNEY DISEASE, UNSPECIFIED WHETHER STAGE 3A OR 3B CKD: Primary | ICD-10-CM

## 2023-07-24 LAB
ALBUMIN SERPL-MCNC: 4.1 G/DL (ref 3.5–5.2)
ALBUMIN/GLOB SERPL: 2 G/DL
ALP SERPL-CCNC: 67 U/L (ref 39–117)
ALT SERPL W P-5'-P-CCNC: 21 U/L (ref 1–41)
ANION GAP SERPL CALCULATED.3IONS-SCNC: 8.1 MMOL/L (ref 5–15)
AST SERPL-CCNC: 19 U/L (ref 1–40)
BASOPHILS # BLD AUTO: 0.04 10*3/MM3 (ref 0–0.2)
BASOPHILS NFR BLD AUTO: 0.6 % (ref 0–1.5)
BILIRUB SERPL-MCNC: 0.8 MG/DL (ref 0–1.2)
BUN SERPL-MCNC: 24 MG/DL (ref 8–23)
BUN/CREAT SERPL: 13.6 (ref 7–25)
CALCIUM SPEC-SCNC: 9.6 MG/DL (ref 8.6–10.5)
CHLORIDE SERPL-SCNC: 104 MMOL/L (ref 98–107)
CO2 SERPL-SCNC: 29.9 MMOL/L (ref 22–29)
CREAT SERPL-MCNC: 1.77 MG/DL (ref 0.76–1.27)
DEPRECATED RDW RBC AUTO: 43 FL (ref 37–54)
EGFRCR SERPLBLD CKD-EPI 2021: 38.1 ML/MIN/1.73
EOSINOPHIL # BLD AUTO: 0.15 10*3/MM3 (ref 0–0.4)
EOSINOPHIL NFR BLD AUTO: 2.3 % (ref 0.3–6.2)
ERYTHROCYTE [DISTWIDTH] IN BLOOD BY AUTOMATED COUNT: 11.9 % (ref 12.3–15.4)
GLOBULIN UR ELPH-MCNC: 2.1 GM/DL
GLUCOSE SERPL-MCNC: 170 MG/DL (ref 65–99)
HBA1C MFR BLD: 6 % (ref 4.8–5.6)
HCT VFR BLD AUTO: 38.3 % (ref 37.5–51)
HGB BLD-MCNC: 13.2 G/DL (ref 13–17.7)
IMM GRANULOCYTES # BLD AUTO: 0.02 10*3/MM3 (ref 0–0.05)
IMM GRANULOCYTES NFR BLD AUTO: 0.3 % (ref 0–0.5)
LYMPHOCYTES # BLD AUTO: 1.87 10*3/MM3 (ref 0.7–3.1)
LYMPHOCYTES NFR BLD AUTO: 28.5 % (ref 19.6–45.3)
MCH RBC QN AUTO: 34.6 PG (ref 26.6–33)
MCHC RBC AUTO-ENTMCNC: 34.5 G/DL (ref 31.5–35.7)
MCV RBC AUTO: 100.5 FL (ref 79–97)
MONOCYTES # BLD AUTO: 0.51 10*3/MM3 (ref 0.1–0.9)
MONOCYTES NFR BLD AUTO: 7.8 % (ref 5–12)
NEUTROPHILS NFR BLD AUTO: 3.96 10*3/MM3 (ref 1.7–7)
NEUTROPHILS NFR BLD AUTO: 60.5 % (ref 42.7–76)
NRBC BLD AUTO-RTO: 0 /100 WBC (ref 0–0.2)
PLATELET # BLD AUTO: 217 10*3/MM3 (ref 140–450)
PMV BLD AUTO: 9.6 FL (ref 6–12)
POTASSIUM SERPL-SCNC: 5 MMOL/L (ref 3.5–5.2)
PROT SERPL-MCNC: 6.2 G/DL (ref 6–8.5)
RBC # BLD AUTO: 3.81 10*6/MM3 (ref 4.14–5.8)
SODIUM SERPL-SCNC: 142 MMOL/L (ref 136–145)
WBC NRBC COR # BLD: 6.55 10*3/MM3 (ref 3.4–10.8)

## 2023-07-24 PROCEDURE — 80053 COMPREHEN METABOLIC PANEL: CPT

## 2023-07-24 PROCEDURE — 36415 COLL VENOUS BLD VENIPUNCTURE: CPT

## 2023-07-24 PROCEDURE — 83036 HEMOGLOBIN GLYCOSYLATED A1C: CPT

## 2023-07-24 PROCEDURE — 85025 COMPLETE CBC W/AUTO DIFF WBC: CPT

## 2023-08-29 ENCOUNTER — OFFICE VISIT (OUTPATIENT)
Dept: FAMILY MEDICINE CLINIC | Facility: CLINIC | Age: 83
End: 2023-08-29
Payer: MEDICARE

## 2023-08-29 VITALS
DIASTOLIC BLOOD PRESSURE: 80 MMHG | HEART RATE: 100 BPM | WEIGHT: 197 LBS | BODY MASS INDEX: 29.52 KG/M2 | SYSTOLIC BLOOD PRESSURE: 120 MMHG | OXYGEN SATURATION: 97 %

## 2023-08-29 DIAGNOSIS — R06.02 SOBOE (SHORTNESS OF BREATH ON EXERTION): ICD-10-CM

## 2023-08-29 DIAGNOSIS — R07.9 INTERMITTENT CHEST PAIN: ICD-10-CM

## 2023-08-29 DIAGNOSIS — I10 ESSENTIAL HYPERTENSION: ICD-10-CM

## 2023-08-29 DIAGNOSIS — E11.22 TYPE 2 DIABETES MELLITUS WITH STAGE 3A CHRONIC KIDNEY DISEASE, WITHOUT LONG-TERM CURRENT USE OF INSULIN: Primary | ICD-10-CM

## 2023-08-29 DIAGNOSIS — M79.18 MUSCULOSKELETAL PAIN: ICD-10-CM

## 2023-08-29 DIAGNOSIS — I38 HEART VALVE CALCIFICATION: ICD-10-CM

## 2023-08-29 DIAGNOSIS — I73.9 PERIPHERAL VASCULAR DISEASE WITH CLAUDICATION: ICD-10-CM

## 2023-08-29 DIAGNOSIS — Z79.899 HIGH RISK MEDICATION USE: ICD-10-CM

## 2023-08-29 DIAGNOSIS — N18.31 STAGE 3A CHRONIC KIDNEY DISEASE: ICD-10-CM

## 2023-08-29 DIAGNOSIS — R01.1 SYSTOLIC MURMUR: ICD-10-CM

## 2023-08-29 DIAGNOSIS — E78.5 HYPERLIPIDEMIA, UNSPECIFIED HYPERLIPIDEMIA TYPE: ICD-10-CM

## 2023-08-29 DIAGNOSIS — N18.31 TYPE 2 DIABETES MELLITUS WITH STAGE 3A CHRONIC KIDNEY DISEASE, WITHOUT LONG-TERM CURRENT USE OF INSULIN: Primary | ICD-10-CM

## 2023-08-29 DIAGNOSIS — M48.10 DISH (DIFFUSE IDIOPATHIC SKELETAL HYPEROSTOSIS): ICD-10-CM

## 2023-08-29 LAB
ALBUMIN SERPL-MCNC: 4.4 G/DL (ref 3.5–5.2)
ALBUMIN UR-MCNC: <1.2 MG/DL
ALBUMIN/GLOB SERPL: 1.6 G/DL
ALP SERPL-CCNC: 78 U/L (ref 39–117)
ALT SERPL W P-5'-P-CCNC: 16 U/L (ref 1–41)
AMPHET+METHAMPHET UR QL: NEGATIVE
ANION GAP SERPL CALCULATED.3IONS-SCNC: 12 MMOL/L (ref 5–15)
AST SERPL-CCNC: 17 U/L (ref 1–40)
BARBITURATES UR QL SCN: NEGATIVE
BASOPHILS # BLD AUTO: 0.03 10*3/MM3 (ref 0–0.2)
BASOPHILS NFR BLD AUTO: 0.4 % (ref 0–1.5)
BENZODIAZ UR QL SCN: NEGATIVE
BILIRUB SERPL-MCNC: 0.9 MG/DL (ref 0–1.2)
BUN SERPL-MCNC: 28 MG/DL (ref 8–23)
BUN/CREAT SERPL: 17.5 (ref 7–25)
CALCIUM SPEC-SCNC: 9.9 MG/DL (ref 8.6–10.5)
CANNABINOIDS SERPL QL: NEGATIVE
CHLORIDE SERPL-SCNC: 100 MMOL/L (ref 98–107)
CHOLEST SERPL-MCNC: 181 MG/DL (ref 0–200)
CO2 SERPL-SCNC: 27 MMOL/L (ref 22–29)
COCAINE UR QL: NEGATIVE
CREAT SERPL-MCNC: 1.6 MG/DL (ref 0.76–1.27)
CREAT UR-MCNC: 178.2 MG/DL
DEPRECATED RDW RBC AUTO: 43.4 FL (ref 37–54)
EGFRCR SERPLBLD CKD-EPI 2021: 43 ML/MIN/1.73
EOSINOPHIL # BLD AUTO: 0.17 10*3/MM3 (ref 0–0.4)
EOSINOPHIL NFR BLD AUTO: 2.1 % (ref 0.3–6.2)
ERYTHROCYTE [DISTWIDTH] IN BLOOD BY AUTOMATED COUNT: 11.9 % (ref 12.3–15.4)
FENTANYL UR-MCNC: NEGATIVE NG/ML
GLOBULIN UR ELPH-MCNC: 2.7 GM/DL
GLUCOSE SERPL-MCNC: 125 MG/DL (ref 65–99)
HBA1C MFR BLD: 6.3 % (ref 4.8–5.6)
HCT VFR BLD AUTO: 42.6 % (ref 37.5–51)
HDLC SERPL-MCNC: 76 MG/DL (ref 40–60)
HGB BLD-MCNC: 14.7 G/DL (ref 13–17.7)
IMM GRANULOCYTES # BLD AUTO: 0.02 10*3/MM3 (ref 0–0.05)
IMM GRANULOCYTES NFR BLD AUTO: 0.2 % (ref 0–0.5)
LDLC SERPL CALC-MCNC: 86 MG/DL (ref 0–100)
LDLC/HDLC SERPL: 1.09 {RATIO}
LYMPHOCYTES # BLD AUTO: 2.47 10*3/MM3 (ref 0.7–3.1)
LYMPHOCYTES NFR BLD AUTO: 29.9 % (ref 19.6–45.3)
MCH RBC QN AUTO: 34.4 PG (ref 26.6–33)
MCHC RBC AUTO-ENTMCNC: 34.5 G/DL (ref 31.5–35.7)
MCV RBC AUTO: 99.8 FL (ref 79–97)
METHADONE UR QL SCN: NEGATIVE
MICROALBUMIN/CREAT UR: NORMAL MG/G{CREAT}
MONOCYTES # BLD AUTO: 0.59 10*3/MM3 (ref 0.1–0.9)
MONOCYTES NFR BLD AUTO: 7.1 % (ref 5–12)
NEUTROPHILS NFR BLD AUTO: 4.98 10*3/MM3 (ref 1.7–7)
NEUTROPHILS NFR BLD AUTO: 60.3 % (ref 42.7–76)
NRBC BLD AUTO-RTO: 0 /100 WBC (ref 0–0.2)
NT-PROBNP SERPL-MCNC: 235 PG/ML (ref 0–1800)
OPIATES UR QL: NEGATIVE
OXYCODONE UR QL SCN: NEGATIVE
PLATELET # BLD AUTO: 265 10*3/MM3 (ref 140–450)
PMV BLD AUTO: 9.8 FL (ref 6–12)
POTASSIUM SERPL-SCNC: 4.5 MMOL/L (ref 3.5–5.2)
PROT SERPL-MCNC: 7.1 G/DL (ref 6–8.5)
RBC # BLD AUTO: 4.27 10*6/MM3 (ref 4.14–5.8)
SODIUM SERPL-SCNC: 139 MMOL/L (ref 136–145)
T4 FREE SERPL-MCNC: 1.11 NG/DL (ref 0.93–1.7)
TRIGL SERPL-MCNC: 111 MG/DL (ref 0–150)
TSH SERPL DL<=0.05 MIU/L-ACNC: 2.69 UIU/ML (ref 0.27–4.2)
VLDLC SERPL-MCNC: 19 MG/DL (ref 5–40)
WBC NRBC COR # BLD: 8.26 10*3/MM3 (ref 3.4–10.8)

## 2023-08-29 PROCEDURE — 80307 DRUG TEST PRSMV CHEM ANLYZR: CPT | Performed by: NURSE PRACTITIONER

## 2023-08-29 PROCEDURE — 84439 ASSAY OF FREE THYROXINE: CPT | Performed by: NURSE PRACTITIONER

## 2023-08-29 PROCEDURE — 82043 UR ALBUMIN QUANTITATIVE: CPT | Performed by: NURSE PRACTITIONER

## 2023-08-29 PROCEDURE — 85025 COMPLETE CBC W/AUTO DIFF WBC: CPT | Performed by: NURSE PRACTITIONER

## 2023-08-29 PROCEDURE — 83036 HEMOGLOBIN GLYCOSYLATED A1C: CPT | Performed by: NURSE PRACTITIONER

## 2023-08-29 PROCEDURE — 82570 ASSAY OF URINE CREATININE: CPT | Performed by: NURSE PRACTITIONER

## 2023-08-29 PROCEDURE — 80061 LIPID PANEL: CPT | Performed by: NURSE PRACTITIONER

## 2023-08-29 PROCEDURE — 80053 COMPREHEN METABOLIC PANEL: CPT | Performed by: NURSE PRACTITIONER

## 2023-08-29 PROCEDURE — 84443 ASSAY THYROID STIM HORMONE: CPT | Performed by: NURSE PRACTITIONER

## 2023-08-29 PROCEDURE — 83880 ASSAY OF NATRIURETIC PEPTIDE: CPT | Performed by: NURSE PRACTITIONER

## 2023-08-29 RX ORDER — ATORVASTATIN CALCIUM 20 MG/1
20 TABLET, FILM COATED ORAL NIGHTLY
Qty: 90 TABLET | Refills: 1 | Status: SHIPPED | OUTPATIENT
Start: 2023-08-29

## 2023-08-29 RX ORDER — METHOCARBAMOL 500 MG/1
500 TABLET, FILM COATED ORAL 4 TIMES DAILY PRN
Qty: 180 TABLET | Refills: 1 | Status: SHIPPED | OUTPATIENT
Start: 2023-08-29

## 2023-08-29 RX ORDER — METOPROLOL TARTRATE 50 MG/1
50 TABLET, FILM COATED ORAL DAILY
Qty: 90 TABLET | Refills: 1 | Status: SHIPPED | OUTPATIENT
Start: 2023-08-29

## 2023-08-29 RX ORDER — LISINOPRIL 40 MG/1
40 TABLET ORAL DAILY
Qty: 90 TABLET | Refills: 1 | Status: SHIPPED | OUTPATIENT
Start: 2023-08-29

## 2023-08-29 RX ORDER — CILOSTAZOL 100 MG/1
100 TABLET ORAL 2 TIMES DAILY
Qty: 180 TABLET | Refills: 1 | Status: SHIPPED | OUTPATIENT
Start: 2023-08-29

## 2023-08-29 NOTE — PROGRESS NOTES
Chief Complaint  Hypertension, Diabetes, and Hyperlipidemia    Subjective            Rudy Moore presents to [facility]  History of Present Illness    Trev presents to the office today, accompanied by his wife, for follow-up on chronic health conditions and for medication refills.    He has essential hypertension with dyslipidemia and peripheral vascular disease.  He has a history of angioplasty.  Currently, blood pressure is normotensive with lisinopril and metoprolol.  He is prescribed atorvastatin for dyslipidemia.  He states in the past several months, since seeing me last, he has had worsening dyspnea on exertion.  He states he can walk around inside his house and get short of breath.  Sometimes he hurts in the sternum.  His chest pain is intermittent and does not occur persistently.  It is mostly noted on exertion and with the shortness of breath.    He saw cardiology approximately a year ago.  He had work-up with 48-hour Holter, echocardiogram, carotid Doppler, and stress test.    Echocardiogram showed preserved left ventricular systolic function.  He had fibrocalcific mitral and aortic valves with mild aortic stenosis, trace mitral regurg, and trace tricuspid regurg.    His Holter monitor showed sinus rhythm with average heart rate of 78 and rare sinus tachycardia.  Max heart rate was 106.  No arrhythmias.  Very rare isolated PACs and PVCs.  No evidence of pause or AV block.    Stress test was negative for ischemia.    His carotid duplex showed no significant stenosis bilaterally.  There was minimal to mild plaque in the right carotid bifurcation and mild to moderate on the left.  This was not significantly changed from his 2020 study.    His diabetes is currently managed with metformin 850 mg daily.  He denies polyuria, polydipsia, or polyphagia.    His pain for chronic musculoskeletal pain with DISH in the setting of stage III chronic kidney disease includes  tramadol 100 mg twice daily, in addition to Robaxin as needed.  He reports that his pain is currently well managed.  He has not had fluctuations in his pain level.  He still able to do what he needs to do from a pain standpoint.  He is more prohibited by his shortness of breath and he is his pain at this point.  No adverse side effects related to pain medication.    PEG: A Three-Item Scale Assessing Pain Intensity and Interference  0 being no pain 10 pain as bad as you can imagine  What number best describes your pain on average in the past week? 5  2.  What number best describes how, during the past week, pain has interfered with your enjoyment of life? 4  3.  What number best describes how, during the past week, pain has interfered with your general activity? 4    Past Medical History:   Diagnosis Date    CAD (coronary artery disease)     Cataract     Diabetes mellitus, type 2     Essential hypertension 2020    GERD (gastroesophageal reflux disease)     Hyperlipidemia     Kidney stones     Peripheral arterial disease 2020    Seasonal allergies        No Known Allergies     Past Surgical History:   Procedure Laterality Date    CARDIAC SURGERY      BYPASS    CAROTID ENDARTERECTOMY Right 2020    FEMORAL ENDARTERECTOMY Bilateral 10/2020    ILIAC ARTERY STENT Bilateral 10/2020        Social History     Tobacco Use    Smoking status: Former     Packs/day: 2.00     Years: 42.00     Pack years: 84.00     Types: Cigarettes     Start date:      Quit date:      Years since quittin.6    Smokeless tobacco: Never    Tobacco comments:     SMOKES 1 TO 2 PACKS PER DAY, CURRENTLY USES OTHER TOBACCO PRODUCTS    Substance Use Topics    Alcohol use: Yes     Comment: DRINKS ALCHOL, 8-14 DRINKS PER WEEK     Drug use: Never       Family History   Problem Relation Age of Onset    Arthritis Mother     Alcohol abuse Father     Asthma Sister     COPD Sister     Breast cancer Sister     Diabetes type I Brother      Stroke Brother         Health Maintenance Due   Topic Date Due    ZOSTER VACCINE (2 of 3) 04/12/2010    DIABETIC EYE EXAM  08/06/2021    COVID-19 Vaccine (6 - Moderna series) 12/31/2021        Current Outpatient Medications on File Prior to Visit   Medication Sig    aspirin 81 MG EC tablet Take 1 tablet by mouth Daily.    multivitamin with minerals tablet tablet Take 1 tablet by mouth Daily.    traMADol (ULTRAM) 50 MG tablet TAKE 2 TABLETS BY MOUTH TWICE DAILY AS NEEDED FOR MODERATE PAIN    [DISCONTINUED] atorvastatin (LIPITOR) 20 MG tablet Take 1 tablet by mouth Every Night.    [DISCONTINUED] cilostazol (PLETAL) 100 MG tablet Take 1 tablet by mouth 2 (Two) Times a Day.    [DISCONTINUED] lisinopril (PRINIVIL,ZESTRIL) 40 MG tablet Take 1 tablet by mouth Daily.    [DISCONTINUED] metFORMIN (Glucophage) 850 MG tablet Take 1 tablet by mouth Daily With Dinner.    [DISCONTINUED] methocarbamol (Robaxin) 500 MG tablet Take 1 tablet by mouth 4 (Four) Times a Day As Needed for Muscle Spasms.    [DISCONTINUED] metoprolol tartrate (LOPRESSOR) 50 MG tablet Take 1 tablet by mouth Daily.     No current facility-administered medications on file prior to visit.       Immunization History   Administered Date(s) Administered    COVID-19 (MODERNA) 1st,2nd,3rd Dose Monovalent 02/09/2021, 02/09/2021, 03/11/2021, 03/11/2021, 11/05/2021    Fluad Quad 65+ 10/18/2021    Fluzone High Dose =>65 Years ([employer] ONLY) 10/14/2020, 10/08/2022    Fluzone High-Dose 65+yrs 10/14/2020    Influenza, Unspecified 08/01/2017, 10/18/2021, 10/08/2022    Pneumococcal Conjugate 13-Valent (PCV13) 04/01/2017    Pneumococcal Conjugate 20-Valent (PCV20) 05/30/2023    Tdap 01/27/2022    Zostavax 02/15/2010       Review of Systems     Objective     /80   Pulse 100   Wt 89.4 kg (197 lb)   SpO2 97%   BMI 29.52 kg/mý       Physical Exam  Vitals reviewed.   Constitutional:       General: He is not in acute distress.     Appearance: He is well-developed and  overweight.   HENT:      Head: Normocephalic and atraumatic.   Eyes:      General: No scleral icterus.     Extraocular Movements: Extraocular movements intact.      Conjunctiva/sclera: Conjunctivae normal.   Neck:      Thyroid: No thyroid mass, thyromegaly or thyroid tenderness.      Vascular: No carotid bruit.      Trachea: Trachea normal.   Cardiovascular:      Rate and Rhythm: Normal rate and regular rhythm.      Pulses: Normal pulses.      Heart sounds: Murmur heard.   Systolic murmur is present with a grade of 2/6.   Pulmonary:      Effort: Pulmonary effort is normal. No respiratory distress.      Breath sounds: Normal breath sounds. No wheezing, rhonchi or rales.   Musculoskeletal:         General: Normal range of motion.      Cervical back: Normal range of motion and neck supple. No tenderness.      Right lower leg: No edema.      Left lower leg: No edema.   Lymphadenopathy:      Cervical: No cervical adenopathy.   Skin:     General: Skin is warm and dry.   Neurological:      Mental Status: He is alert and oriented to person, place, and time.   Psychiatric:         Mood and Affect: Mood and affect normal.         Behavior: Behavior normal.         Thought Content: Thought content normal.         Judgment: Judgment normal.       Result Review :     The following data was reviewed by: RUSS Herrera on 08/29/2023:    CMP          2/9/2023    11:05 3/21/2023    15:01 7/24/2023    11:57   CMP   Glucose 112  102  170    BUN 20  20  24    Creatinine 1.40  1.45  1.77    EGFR 50.5  48.4  38.1    Sodium 140  140  142    Potassium 4.6  4.7  5.0    Chloride 101  101  104    Calcium 9.4  9.6  9.6    Total Protein 6.9  6.8  6.2    Albumin 4.1  4.0  4.1    Globulin 2.8  2.8  2.1    Total Bilirubin 0.6  0.7  0.8    Alkaline Phosphatase 77  74  67    AST (SGOT) 23  24  19    ALT (SGPT) 22  24  21    Albumin/Globulin Ratio 1.5  1.4  2.0    BUN/Creatinine Ratio 14.3  13.8  13.6    Anion Gap 10.0  11.0  8.1      CBC           11/14/2022    14:37 2/9/2023    11:05 7/24/2023    11:57   CBC   WBC 7.36  7.51  6.55    RBC 3.99  4.16  3.81    Hemoglobin 13.4  14.2  13.2    Hematocrit 38.4  41.5  38.3    MCV 96.2  99.8  100.5    MCH 33.6  34.1  34.6    MCHC 34.9  34.2  34.5    RDW 12.0  12.5  11.9    Platelets 284  230  217      Lipid Panel          11/8/2022    09:01 2/9/2023    11:05   Lipid Panel   Total Cholesterol 174  191    Triglycerides 90  101    HDL Cholesterol 70  83    VLDL Cholesterol 16  18    LDL Cholesterol  88  90    LDL/HDL Ratio 1.23  1.06      TSH          11/8/2022    09:01 2/9/2023    11:05   TSH   TSH 2.860  2.070      A1C Last 3 Results          2/9/2023    11:05 3/21/2023    15:01 7/24/2023    11:57   HGBA1C Last 3 Results   Hemoglobin A1C 6.00  6.00  6.00      Microalbumin          11/8/2022    09:15 2/9/2023    11:11   Microalbumin   Microalbumin, Urine <1.2  2.3      PSA          2/9/2023    11:05   PSA   PSA 0.041        Data reviewed : Radiologic studies : CXR today  and Cardiology studies : EKG today, plus additional studies below.       Adult Transthoracic Echo Complete W/ Cont if Necessary Per Protocol (05/03/2022 12:09)  Holter monitor - 48 hour (05/03/2022 11:29)  Duplex Carotid Ultrasound CAR (05/03/2022 11:42)  SCANNED - HOLTER MONITOR (05/03/2022)  Stress Test With Myocardial Perfusion One Day (05/17/2022 11:39)    XR Chest PA & Lateral (In Office) (08/29/2023 10:51)     ECG 12 Lead    Date/Time: 8/29/2023 11:09 AM  Performed by: Kim Dailey APRN  Authorized by: Kim Dailey APRN   Comparison: compared with previous ECG from 4/12/2022  Rhythm: sinus rhythm  Rate: normal  BPM: 89  Conduction comments: Consider left anterior fascicular block  QRS axis: left (-45)    Clinical impression: abnormal EKG            Assessment and Plan      Diagnoses and all orders for this visit:    1. Type 2 diabetes mellitus with stage 3a chronic kidney disease, without long-term current use of insulin  (Primary)  Comments:  Encouraged annual eye exam  Orders:  -     metFORMIN (Glucophage) 850 MG tablet; Take 1 tablet by mouth Daily With Dinner.  Dispense: 90 tablet; Refill: 1  -     CBC Auto Differential  -     Comprehensive Metabolic Panel  -     Hemoglobin A1c  -     Lipid Panel  -     TSH+Free T4  -     Microalbumin / Creatinine Urine Ratio - Urine, Clean Catch    2. Essential hypertension  -     lisinopril (PRINIVIL,ZESTRIL) 40 MG tablet; Take 1 tablet by mouth Daily.  Dispense: 90 tablet; Refill: 1  -     metoprolol tartrate (LOPRESSOR) 50 MG tablet; Take 1 tablet by mouth Daily.  Dispense: 90 tablet; Refill: 1  -     CBC Auto Differential  -     Comprehensive Metabolic Panel  -     Lipid Panel  -     TSH+Free T4  -     Microalbumin / Creatinine Urine Ratio - Urine, Clean Catch    3. Hyperlipidemia, unspecified hyperlipidemia type  -     atorvastatin (LIPITOR) 20 MG tablet; Take 1 tablet by mouth Every Night.  Dispense: 90 tablet; Refill: 1  -     Comprehensive Metabolic Panel  -     Lipid Panel    4. Peripheral vascular disease with claudication  -     cilostazol (PLETAL) 100 MG tablet; Take 1 tablet by mouth 2 (Two) Times a Day.  Dispense: 180 tablet; Refill: 1    5. Musculoskeletal pain  -     methocarbamol (Robaxin) 500 MG tablet; Take 1 tablet by mouth 4 (Four) Times a Day As Needed for Muscle Spasms.  Dispense: 180 tablet; Refill: 1    6. DISH (diffuse idiopathic skeletal hyperostosis)    7. Stage 3a chronic kidney disease    8. High risk medication use  -     Urine Drug Screen - Urine, Clean Catch    9. SOBOE (shortness of breath on exertion)  -     XR Chest PA & Lateral (In Office)  -     ECG 12 Lead  -     Adult Transthoracic Echo Complete W/ Cont if Necessary Per Protocol; Future  -     BNP  -     Ambulatory Referral to Cardiology    10. Intermittent chest pain  -     XR Chest PA & Lateral (In Office)  -     ECG 12 Lead  -     Adult Transthoracic Echo Complete W/ Cont if Necessary Per Protocol;  Future  -     Ambulatory Referral to Cardiology    11. Heart valve calcification  -     Adult Transthoracic Echo Complete W/ Cont if Necessary Per Protocol; Future  -     Ambulatory Referral to Cardiology    12. Systolic murmur  -     Ambulatory Referral to Cardiology            Follow Up     Return in about 13 weeks (around 11/28/2023) for Next scheduled follow up (follow up pain medication 3 months).    Continue current medications.  No changes today.    His EKG is changed from last year.  QRS showing left axis deviation with consideration of left anterior fascicular block on EKG.  He has had worsening dyspnea on exertion over the past year, even more so in the past several months.  He is having sternal chest discomfort. CXR does not appear to show any acute findings - will confirm with radiology interpretation and notify with changes. Systolic murmur auscultated on exam today with the bell.  I am concerned for worsening aortic stenosis, which we have discussed.  I will repeat his echocardiogram and refer to cardiology in [facility] as he is known to Dr. Randolph.  He will let me know how that visit goes.    Patient was given instructions and counseling regarding his condition or for health maintenance advice. Please see specific information pulled into the AVS if appropriate.

## 2023-09-06 DIAGNOSIS — M48.10 DISH (DIFFUSE IDIOPATHIC SKELETAL HYPEROSTOSIS): ICD-10-CM

## 2023-09-06 DIAGNOSIS — Z79.899 HIGH RISK MEDICATION USE: ICD-10-CM

## 2023-09-06 DIAGNOSIS — M79.18 MUSCULOSKELETAL PAIN: ICD-10-CM

## 2023-09-06 DIAGNOSIS — N18.31 STAGE 3A CHRONIC KIDNEY DISEASE: ICD-10-CM

## 2023-09-06 RX ORDER — TRAMADOL HYDROCHLORIDE 50 MG/1
100 TABLET ORAL 2 TIMES DAILY PRN
Qty: 120 TABLET | Refills: 2 | Status: SHIPPED | OUTPATIENT
Start: 2023-09-06

## 2023-09-06 NOTE — TELEPHONE ENCOUNTER
Caller: Rudy Moore    Relationship: Self    Best call back number: [phone]    Requested Prescriptions:   Requested Prescriptions     Pending Prescriptions Disp Refills    traMADol (ULTRAM) 50 MG tablet 120 tablet 2     Sig: Take 2 tablets by mouth 2 (Two) Times a Day As Needed for Moderate Pain.        Pharmacy where request should be sent: [id number]  [place], [place] - [address] AT [facility] [phone] [facility] [phone] FX     Last office visit with prescribing clinician: 8/29/2023   Last telemedicine visit with prescribing clinician: Visit date not found   Next office visit with prescribing clinician: 11/27/2023     Additional details provided by patient: ALSO PATIENT STATES THAT HE NEEDS MEDICATION METHOCARBAMOL NEEDS TO BE REMOVED FROM HIS MEDICATION LIST.  PATIENT NO LONGER TAKES THIS MEDICATION     Does the patient have less than a 3 day supply:  [x] Yes  [] No    Would you like a call back once the refill request has been completed: [x] Yes [] No    If the office needs to give you a call back, can they leave a voicemail: [x] Yes [] No    MariaD olores Gillespie Rep   09/06/23 08:12 EDT

## 2023-09-07 ENCOUNTER — TELEPHONE (OUTPATIENT)
Dept: FAMILY MEDICINE CLINIC | Facility: CLINIC | Age: 83
End: 2023-09-07

## 2023-09-07 DIAGNOSIS — N18.31 TYPE 2 DIABETES MELLITUS WITH STAGE 3A CHRONIC KIDNEY DISEASE, WITHOUT LONG-TERM CURRENT USE OF INSULIN: Primary | ICD-10-CM

## 2023-09-07 DIAGNOSIS — E11.22 TYPE 2 DIABETES MELLITUS WITH STAGE 3A CHRONIC KIDNEY DISEASE, WITHOUT LONG-TERM CURRENT USE OF INSULIN: Primary | ICD-10-CM

## 2023-09-07 RX ORDER — CALCIUM CITRATE/VITAMIN D3 200MG-6.25
1 TABLET ORAL DAILY
Qty: 100 EACH | Refills: 3 | Status: SHIPPED | OUTPATIENT
Start: 2023-09-07

## 2023-09-07 NOTE — TELEPHONE ENCOUNTER
Caller: JOSE CRUZ SMITH    Relationship: Emergency Contact    Best call back number: [phone]    Requested Prescriptions:   Requested Prescriptions      No prescriptions requested or ordered in this encounter    TRUE METRICS TEST STRIPS    Pharmacy where request should be sent: [facility] #[id number] - [place] - [address] AT [facility] [place] - [phone]  - [phone] FX     Last office visit with prescribing clinician: 8/29/2023   Last telemedicine visit with prescribing clinician: Visit date not found   Next office visit with prescribing clinician: 11/27/2023     Additional details provided by patient: PATIENT NEEDS A NEW PRESCRIPTION FOR HIS DIABETIC TEST STRIPS. PLEASE SEND NEW PRESCRIPTION WITH REFILLS TO PHARMACY ASAP.    Does the patient have less than a 3 day supply:  [] Yes  [x] No    Would you like a call back once the refill request has been completed: [] Yes [] No    If the office needs to give you a call back, can they leave a voicemail: [] Yes [] No    Maria Dolores Lentz Rep   09/07/23 10:28 EDT

## 2023-09-19 ENCOUNTER — OFFICE VISIT (OUTPATIENT)
Dept: CARDIOLOGY | Facility: CLINIC | Age: 83
End: 2023-09-19
Payer: MEDICARE

## 2023-09-19 VITALS
WEIGHT: 199 LBS | HEART RATE: 76 BPM | HEIGHT: 69 IN | DIASTOLIC BLOOD PRESSURE: 62 MMHG | SYSTOLIC BLOOD PRESSURE: 143 MMHG | BODY MASS INDEX: 29.47 KG/M2

## 2023-09-19 DIAGNOSIS — R06.02 SHORTNESS OF BREATH: ICD-10-CM

## 2023-09-19 DIAGNOSIS — E78.2 HYPERLIPEMIA, MIXED: ICD-10-CM

## 2023-09-19 DIAGNOSIS — Z95.5 HISTORY OF CORONARY ANGIOPLASTY WITH INSERTION OF STENT: ICD-10-CM

## 2023-09-19 DIAGNOSIS — I25.10 CORONARY ARTERY DISEASE INVOLVING NATIVE CORONARY ARTERY OF NATIVE HEART WITHOUT ANGINA PECTORIS: Primary | ICD-10-CM

## 2023-09-19 PROCEDURE — 3078F DIAST BP <80 MM HG: CPT | Performed by: SPECIALIST

## 2023-09-19 PROCEDURE — 1159F MED LIST DOCD IN RCRD: CPT | Performed by: SPECIALIST

## 2023-09-19 PROCEDURE — 3077F SYST BP >= 140 MM HG: CPT | Performed by: SPECIALIST

## 2023-09-19 PROCEDURE — 1160F RVW MEDS BY RX/DR IN RCRD: CPT | Performed by: SPECIALIST

## 2023-09-19 PROCEDURE — 99214 OFFICE O/P EST MOD 30 MIN: CPT | Performed by: SPECIALIST

## 2023-09-19 NOTE — PROGRESS NOTES
[facility]  Cardiology progress Note    Patient Name: Rudy Moore  : 1940    CHIEF COMPLAINT  CORONARY ARTERY DISEASE/chest pain        Subjective   Subjective     HISTORY OF PRESENT ILLNESS    Rudy Moore is a 82 y.o. male with history of coronary disease status post PTCA/stent has been having chest pain on and off for a year.  Chest pain is substernal continuous nonexertional relieved spontaneously.  No exertional angina.  Has shortness of breath off and on.    REVIEW OF SYSTEMS    Constitutional:    No fever, no weight loss  Skin:     No rash  Otolaryngeal:    No difficulty swallowing  Cardiovascular: See HPI.  Pulmonary:    No cough, no sputum production    Personal History     Social History:    reports that he quit smoking about 22 years ago. His smoking use included cigarettes. He started smoking about 64 years ago. He has a 84.00 pack-year smoking history. He has never used smokeless tobacco. He reports current alcohol use. He reports that he does not use drugs.    Home Medications:  Current Outpatient Medications on File Prior to Visit   Medication Sig    aspirin 81 MG EC tablet Take 1 tablet by mouth Daily.    atorvastatin (LIPITOR) 20 MG tablet Take 1 tablet by mouth Every Night.    cilostazol (PLETAL) 100 MG tablet Take 1 tablet by mouth 2 (Two) Times a Day.    glucose blood (True Metrix Blood Glucose Test) test strip 1 each by Other route Daily. Use as instructed    lisinopril (PRINIVIL,ZESTRIL) 40 MG tablet Take 1 tablet by mouth Daily.    metFORMIN (Glucophage) 850 MG tablet Take 1 tablet by mouth Daily With Dinner.    methocarbamol (Robaxin) 500 MG tablet Take 1 tablet by mouth 4 (Four) Times a Day As Needed for Muscle Spasms.    metoprolol tartrate (LOPRESSOR) 50 MG tablet Take 1 tablet by mouth Daily.    multivitamin with minerals tablet tablet Take 1 tablet by mouth Daily.    traMADol (ULTRAM) 50 MG tablet Take 2 tablets by mouth 2 (Two) Times a Day As  Needed for Moderate Pain.     No current facility-administered medications on file prior to visit.       Past Medical History:   Diagnosis Date    CAD (coronary artery disease)     Cataract     Diabetes mellitus, type 2     Essential hypertension 07/23/2020    GERD (gastroesophageal reflux disease)     Hyperlipidemia     Kidney stones     Peripheral arterial disease 07/23/2020    Seasonal allergies        Allergies:  No Known Allergies    Objective    Objective       Vitals:   Heart Rate:  [76] 76  BP: (143)/(62) 143/62  Body mass index is 29.82 kg/m².     PHYSICAL EXAM:    General Appearance:   well developed  well nourished  HENT:   oropharynx moist  lips not cyanotic  Neck:  thyroid not enlarged  supple  Respiratory:  no respiratory distress  normal breath sounds  no rales  Cardiovascular:  no jugular venous distention  regular rhythm  apical impulse normal  S1 normal, S2 normal  no S3, no S4   no murmur  no rub, no thrill  carotid pulses normal; no bruit  pedal pulses normal  lower extremity edema: none    Skin:   warm, dry  Psychiatric:  judgement and insight appropriate  normal mood and affect        Result Review:  I have personally reviewed the available results from  [x]  Laboratory  [x]  EKG  [x]  Cardiology  [x]  Medications  [x]  Old records  []  Other:     Procedures  Lab Results   Component Value Date    CHOL 181 08/29/2023    CHOL 191 02/09/2023    CHOL 174 11/08/2022     Lab Results   Component Value Date    TRIG 111 08/29/2023    TRIG 101 02/09/2023    TRIG 90 11/08/2022     Lab Results   Component Value Date    HDL 76 (H) 08/29/2023    HDL 83 (H) 02/09/2023    HDL 70 (H) 11/08/2022     Lab Results   Component Value Date    LDL 86 08/29/2023    LDL 90 02/09/2023    LDL 88 11/08/2022     Lab Results   Component Value Date    VLDL 19 08/29/2023    VLDL 18 02/09/2023    VLDL 16 11/08/2022     Results for orders placed during the hospital encounter of 05/03/22    Adult Transthoracic Echo Complete W/  Cont if Necessary Per Protocol    Interpretation Summary  Fibrocalcific mitral and aortic valves.  Normal left ventricular systolic function.  Trace MR and trace TR.  Mild aortic stenosis.     Impression/Plan:  1. Coronary disease s/p PTCA/stent atypical chest pain: Sestamibi stress test to evaluate for ischemia.  Continue aspirin 81 mg once a day.  Continue metoprolol 50 mg once a day.  2.  Essential hypertension controlled: Continue Zestril 40 mg once a day.  Continue metoprolol 50 mg once a day.  3.  Hyperlipidemia: Continue Lipitor 20 mg once a day.  Monitor lipid and hepatic profile.  4.  Shortness of breath: Echocardiogram.        Fabiano Randolph MD   09/19/23   10:28 EDT

## 2023-09-21 ENCOUNTER — HOSPITAL ENCOUNTER (OUTPATIENT)
Dept: CARDIOLOGY | Facility: HOSPITAL | Age: 83
Discharge: HOME | End: 2023-09-21
Admitting: NURSE PRACTITIONER
Payer: MEDICARE

## 2023-09-21 DIAGNOSIS — R07.9 INTERMITTENT CHEST PAIN: ICD-10-CM

## 2023-09-21 DIAGNOSIS — R06.02 SOBOE (SHORTNESS OF BREATH ON EXERTION): ICD-10-CM

## 2023-09-21 DIAGNOSIS — I38 HEART VALVE CALCIFICATION: ICD-10-CM

## 2023-09-21 LAB
ASCENDING AORTA: 3.4 CM
BH CV ECHO MEAS - AO MAX PG: 40 MMHG
BH CV ECHO MEAS - AO MEAN PG: 23.9 MMHG
BH CV ECHO MEAS - AO ROOT DIAM: 3.2 CM
BH CV ECHO MEAS - AO V2 MAX: 316 CM/SEC
BH CV ECHO MEAS - AO V2 VTI: 81.5 CM
BH CV ECHO MEAS - AVA(I,D): 0.66 CM2
BH CV ECHO MEAS - EDV(CUBED): 83 ML
BH CV ECHO MEAS - EDV(MOD-SP2): 54.1 ML
BH CV ECHO MEAS - EDV(MOD-SP4): 81.2 ML
BH CV ECHO MEAS - EF(MOD-BP): 66.2 %
BH CV ECHO MEAS - EF(MOD-SP2): 60.6 %
BH CV ECHO MEAS - EF(MOD-SP4): 68.5 %
BH CV ECHO MEAS - ESV(CUBED): 21.5 ML
BH CV ECHO MEAS - ESV(MOD-SP2): 21.3 ML
BH CV ECHO MEAS - ESV(MOD-SP4): 25.6 ML
BH CV ECHO MEAS - FS: 36.3 %
BH CV ECHO MEAS - IVS/LVPW: 0.93 CM
BH CV ECHO MEAS - IVSD: 1.3 CM
BH CV ECHO MEAS - LA DIMENSION: 3.3 CM
BH CV ECHO MEAS - LAT PEAK E' VEL: 6.3 CM/SEC
BH CV ECHO MEAS - LV DIASTOLIC VOL/BSA (35-75): 39.3 CM2
BH CV ECHO MEAS - LV MASS(C)D: 166.4 GRAMS
BH CV ECHO MEAS - LV MAX PG: 1.79 MMHG
BH CV ECHO MEAS - LV MEAN PG: 1.21 MMHG
BH CV ECHO MEAS - LV SYSTOLIC VOL/BSA (12-30): 12.4 CM2
BH CV ECHO MEAS - LV V1 MAX: 66.9 CM/SEC
BH CV ECHO MEAS - LV V1 VTI: 17 CM
BH CV ECHO MEAS - LVIDD: 4.4 CM
BH CV ECHO MEAS - LVIDS: 2.8 CM
BH CV ECHO MEAS - LVOT AREA: 3.2 CM2
BH CV ECHO MEAS - LVOT DIAM: 2.01 CM
BH CV ECHO MEAS - LVPWD: 1.2 CM
BH CV ECHO MEAS - MED PEAK E' VEL: 5.8 CM/SEC
BH CV ECHO MEAS - MR MAX PG: 109 MMHG
BH CV ECHO MEAS - MR MAX VEL: 522 CM/SEC
BH CV ECHO MEAS - MR MEAN PG: 71.8 MMHG
BH CV ECHO MEAS - MR MEAN VEL: 405.2 CM/SEC
BH CV ECHO MEAS - MR VTI: 176.6 CM
BH CV ECHO MEAS - MV A MAX VEL: 143.1 CM/SEC
BH CV ECHO MEAS - MV DEC SLOPE: 645 CM/SEC2
BH CV ECHO MEAS - MV DEC TIME: 0.2 SEC
BH CV ECHO MEAS - MV E MAX VEL: 121 CM/SEC
BH CV ECHO MEAS - MV E/A: 0.85
BH CV ECHO MEAS - MV MAX PG: 8 MMHG
BH CV ECHO MEAS - MV MEAN PG: 3.6 MMHG
BH CV ECHO MEAS - MV P1/2T: 64.4 MSEC
BH CV ECHO MEAS - MV V2 VTI: 40.2 CM
BH CV ECHO MEAS - MVA(P1/2T): 3.4 CM2
BH CV ECHO MEAS - MVA(VTI): 1.34 CM2
BH CV ECHO MEAS - RAP SYSTOLE: 3 MMHG
BH CV ECHO MEAS - RVDD: 2.33 CM
BH CV ECHO MEAS - RVSP: 29 MMHG
BH CV ECHO MEAS - SI(MOD-SP2): 15.9 ML/M2
BH CV ECHO MEAS - SI(MOD-SP4): 26.9 ML/M2
BH CV ECHO MEAS - SV(LVOT): 53.9 ML
BH CV ECHO MEAS - SV(MOD-SP2): 32.8 ML
BH CV ECHO MEAS - SV(MOD-SP4): 55.6 ML
BH CV ECHO MEAS - TR MAX PG: 26 MMHG
BH CV ECHO MEAS - TR MAX VEL: 255.2 CM/SEC
BH CV ECHO MEASUREMENTS AVERAGE E/E' RATIO: 20
LEFT ATRIUM VOLUME INDEX: 25.2 ML/M2

## 2023-09-21 PROCEDURE — 93306 TTE W/DOPPLER COMPLETE: CPT

## 2023-10-03 ENCOUNTER — TELEPHONE (OUTPATIENT)
Dept: CARDIOLOGY | Facility: CLINIC | Age: 83
End: 2023-10-03
Payer: MEDICARE

## 2023-10-03 NOTE — TELEPHONE ENCOUNTER
Caller: JOSE CRUZ SMITH    Relationship: Self    Best call back number: [phone]     What is the best time to reach you: ANYTIME    Who are you requesting to speak with (clinical staff, provider,  specific staff member): ANYONE    Do you know the name of the person who called: JOSE CRUZ    What was the call regarding: JOSE CRUZ CALLED IN ASKING IF DR. CUADRA WANTS THE PATIENT TO HAVE A SOONER APPOINTMENT DUE TO HIS ECHO RESULTS.    Is it okay if the provider responds through [facility]: NO, PLEASE CALL

## 2023-10-03 NOTE — TELEPHONE ENCOUNTER
Called and spoke with Angelika and they wanted to wait until after the stress test to be done before coming back in

## 2023-10-30 ENCOUNTER — HOSPITAL ENCOUNTER (OUTPATIENT)
Dept: NUCLEAR MEDICINE | Facility: HOSPITAL | Age: 83
Discharge: HOME | End: 2023-10-30
Admitting: SPECIALIST
Payer: MEDICARE

## 2023-10-30 DIAGNOSIS — Z95.5 HISTORY OF CORONARY ANGIOPLASTY WITH INSERTION OF STENT: ICD-10-CM

## 2023-10-30 DIAGNOSIS — I25.10 CORONARY ARTERY DISEASE INVOLVING NATIVE CORONARY ARTERY OF NATIVE HEART WITHOUT ANGINA PECTORIS: ICD-10-CM

## 2023-10-30 PROCEDURE — 78452 HT MUSCLE IMAGE SPECT MULT: CPT | Performed by: INTERNAL MEDICINE

## 2023-10-30 PROCEDURE — 78452 HT MUSCLE IMAGE SPECT MULT: CPT

## 2023-10-30 PROCEDURE — A9502 TC99M TETROFOSMIN: HCPCS | Performed by: SPECIALIST

## 2023-10-30 PROCEDURE — 93017 CV STRESS TEST TRACING ONLY: CPT

## 2023-10-30 PROCEDURE — 93016 CV STRESS TEST SUPVJ ONLY: CPT | Performed by: NURSE PRACTITIONER

## 2023-10-30 PROCEDURE — 0 TECHNETIUM TETROFOSMIN KIT: Performed by: SPECIALIST

## 2023-10-30 PROCEDURE — 25010000002 REGADENOSON 0.4 MG/5ML SOLUTION: Performed by: SPECIALIST

## 2023-10-30 PROCEDURE — 93018 CV STRESS TEST I&R ONLY: CPT | Performed by: INTERNAL MEDICINE

## 2023-10-30 RX ORDER — REGADENOSON 0.08 MG/ML
0.4 INJECTION, SOLUTION INTRAVENOUS
Status: COMPLETED | OUTPATIENT
Start: 2023-10-30 | End: 2023-10-30

## 2023-10-30 RX ADMIN — REGADENOSON 0.4 MG: 0.08 INJECTION, SOLUTION INTRAVENOUS at 11:35

## 2023-10-30 RX ADMIN — TETROFOSMIN 1 DOSE: 1.38 INJECTION, POWDER, LYOPHILIZED, FOR SOLUTION INTRAVENOUS at 10:15

## 2023-10-30 RX ADMIN — TETROFOSMIN 1 DOSE: 1.38 INJECTION, POWDER, LYOPHILIZED, FOR SOLUTION INTRAVENOUS at 11:35

## 2023-11-01 LAB
BH CV IMMEDIATE POST TECH DATA BLOOD PRESSURE: NORMAL MMHG
BH CV IMMEDIATE POST TECH DATA HEART RATE: 107 BPM
BH CV IMMEDIATE POST TECH DATA OXYGEN SATS: 94 %
BH CV REST NUCLEAR ISOTOPE DOSE: 9 MCI
BH CV SIX MINUTE RECOVERY TECH DATA BLOOD PRESSURE: NORMAL
BH CV SIX MINUTE RECOVERY TECH DATA HEART RATE: 103 BPM
BH CV SIX MINUTE RECOVERY TECH DATA OXYGEN SATURATION: 95 %
BH CV STRESS BP STAGE 1: NORMAL
BH CV STRESS COMMENTS STAGE 1: NORMAL
BH CV STRESS DOSE REGADENOSON STAGE 1: 0.4
BH CV STRESS DURATION MIN STAGE 1: 0
BH CV STRESS DURATION SEC STAGE 1: 10
BH CV STRESS HR STAGE 1: 109
BH CV STRESS NUCLEAR ISOTOPE DOSE: 33.4 MCI
BH CV STRESS O2 STAGE 1: 94
BH CV STRESS PROTOCOL 1: NORMAL
BH CV STRESS RECOVERY BP: NORMAL MMHG
BH CV STRESS RECOVERY HR: 103 BPM
BH CV STRESS RECOVERY O2: 95 %
BH CV STRESS STAGE 1: 1
BH CV THREE MINUTE POST TECH DATA BLOOD PRESSURE: NORMAL MMHG
BH CV THREE MINUTE POST TECH DATA HEART RATE: 106 BPM
BH CV THREE MINUTE POST TECH DATA OXYGEN SATURATION: 96 %
LV EF NUC BP: 48 %
MAXIMAL PREDICTED HEART RATE: 139 BPM
PERCENT MAX PREDICTED HR: 78.42 %
STRESS BASELINE BP: NORMAL MMHG
STRESS BASELINE HR: 85 BPM
STRESS O2 SAT REST: 93 %
STRESS PERCENT HR: 92 %
STRESS POST O2 SAT PEAK: 94 %
STRESS POST PEAK BP: NORMAL MMHG
STRESS POST PEAK HR: 109 BPM
STRESS TARGET HR: 118 BPM

## 2023-11-03 ENCOUNTER — TELEPHONE (OUTPATIENT)
Dept: CARDIOLOGY | Facility: CLINIC | Age: 83
End: 2023-11-03
Payer: MEDICARE

## 2023-11-03 NOTE — TELEPHONE ENCOUNTER
----- Message from RUSS Ortez sent at 11/3/2023  8:37 AM EDT -----  Myocardial perfusion images demonstrate small area of moderate perfusion defect in the basal inferior segment with partial reversibility on resting images which could indicate prior infarct with jaliene-infarct ischemia.  This is a  low risk myocardial perfusion study.  He has follow-up in 3 days and this can be discussed further at that time however if he develops new or worsening anginal symptoms over the weekend then he should go to the ER.

## 2023-11-06 ENCOUNTER — OFFICE VISIT (OUTPATIENT)
Dept: CARDIOLOGY | Facility: CLINIC | Age: 83
End: 2023-11-06
Payer: MEDICARE

## 2023-11-06 VITALS
BODY MASS INDEX: 29.47 KG/M2 | WEIGHT: 199 LBS | DIASTOLIC BLOOD PRESSURE: 61 MMHG | HEIGHT: 69 IN | HEART RATE: 66 BPM | SYSTOLIC BLOOD PRESSURE: 147 MMHG

## 2023-11-06 DIAGNOSIS — I25.10 CAD S/P PERCUTANEOUS CORONARY ANGIOPLASTY: Primary | ICD-10-CM

## 2023-11-06 DIAGNOSIS — I35.0 AORTIC STENOSIS, MODERATE: ICD-10-CM

## 2023-11-06 DIAGNOSIS — Z98.61 CAD S/P PERCUTANEOUS CORONARY ANGIOPLASTY: Primary | ICD-10-CM

## 2023-11-06 DIAGNOSIS — I34.0 MILD MITRAL REGURGITATION: ICD-10-CM

## 2023-11-06 PROBLEM — Z95.5 HISTORY OF CORONARY ANGIOPLASTY WITH INSERTION OF STENT: Status: RESOLVED | Noted: 2022-05-05 | Resolved: 2023-11-06

## 2023-11-06 PROBLEM — E78.5 HYPERLIPIDEMIA LDL GOAL <70: Status: ACTIVE | Noted: 2023-11-06

## 2023-11-06 PROCEDURE — 1159F MED LIST DOCD IN RCRD: CPT | Performed by: NURSE PRACTITIONER

## 2023-11-06 PROCEDURE — 3077F SYST BP >= 140 MM HG: CPT | Performed by: NURSE PRACTITIONER

## 2023-11-06 PROCEDURE — 3078F DIAST BP <80 MM HG: CPT | Performed by: NURSE PRACTITIONER

## 2023-11-06 PROCEDURE — 1160F RVW MEDS BY RX/DR IN RCRD: CPT | Performed by: NURSE PRACTITIONER

## 2023-11-06 PROCEDURE — 99214 OFFICE O/P EST MOD 30 MIN: CPT | Performed by: NURSE PRACTITIONER

## 2023-11-06 RX ORDER — ISOSORBIDE MONONITRATE 30 MG/1
30 TABLET, EXTENDED RELEASE ORAL DAILY
Qty: 30 TABLET | Refills: 3 | Status: SHIPPED | OUTPATIENT
Start: 2023-11-06

## 2023-11-06 NOTE — PROGRESS NOTES
Chief Complaint  Follow-up    Subjective            History of Present Illness  Rudy Moore is an 82-year-old white/ male patient who presents to the office today for follow-up.  He was seen by Dr. Randolph on 2023 for regular follow-up and had complained of shortness of breath.  At that time an echocardiogram was ordered which showed moderate aortic stenosis and mild mitral regurgitation.  Stress test showed a small area of moderate perfusion defect in the basal inferior segment with partial reversibility on resting images indicating prior infarct with jailene-infarct ischemia.  He admits that his shortness of breath has continued to worsen over the last couple of weeks and is also having some chest discomfort.    PMH  Past Medical History:   Diagnosis Date    CAD S/P percutaneous coronary angioplasty 2022    Cataract     Diabetes mellitus, type 2     Essential hypertension 2020    GERD (gastroesophageal reflux disease)     History of coronary angioplasty with insertion of stent     Hyperlipidemia     Kidney stones     Peripheral arterial disease 2020    Seasonal allergies          ALLERGY  No Known Allergies       SURGICALHX  Past Surgical History:   Procedure Laterality Date    CARDIAC SURGERY      BYPASS    CAROTID ENDARTERECTOMY Right 2020    FEMORAL ENDARTERECTOMY Bilateral 10/2020    ILIAC ARTERY STENT Bilateral 10/2020          SOC  Social History     Socioeconomic History    Marital status:    Tobacco Use    Smoking status: Former     Packs/day: 2.00     Years: 42.00     Additional pack years: 0.00     Total pack years: 84.00     Types: Cigarettes     Start date:      Quit date:      Years since quittin.8    Smokeless tobacco: Never    Tobacco comments:     SMOKES 1 TO 2 PACKS PER DAY, CURRENTLY USES OTHER TOBACCO PRODUCTS    Vaping Use    Vaping Use: Never used   Substance and Sexual Activity    Alcohol use: Yes     Comment: DRINKS ALCHOL, 8-14  "DRINKS PER WEEK     Drug use: Never         FAMHX  Family History   Problem Relation Age of Onset    Arthritis Mother     Alcohol abuse Father     Asthma Sister     COPD Sister     Breast cancer Sister     Diabetes type I Brother     Stroke Brother           GEN  Current Outpatient Medications on File Prior to Visit   Medication Sig    aspirin 81 MG EC tablet Take 1 tablet by mouth Daily.    atorvastatin (LIPITOR) 20 MG tablet Take 1 tablet by mouth Every Night.    cilostazol (PLETAL) 100 MG tablet Take 1 tablet by mouth 2 (Two) Times a Day.    glucose blood (True Metrix Blood Glucose Test) test strip 1 each by Other route Daily. Use as instructed    lisinopril (PRINIVIL,ZESTRIL) 40 MG tablet Take 1 tablet by mouth Daily.    metFORMIN (Glucophage) 850 MG tablet Take 1 tablet by mouth Daily With Dinner.    methocarbamol (Robaxin) 500 MG tablet Take 1 tablet by mouth 4 (Four) Times a Day As Needed for Muscle Spasms.    metoprolol tartrate (LOPRESSOR) 50 MG tablet Take 1 tablet by mouth Daily.    multivitamin with minerals tablet tablet Take 1 tablet by mouth Daily.    traMADol (ULTRAM) 50 MG tablet Take 2 tablets by mouth 2 (Two) Times a Day As Needed for Moderate Pain.     No current facility-administered medications on file prior to visit.         Objective   /61 (BP Location: Right arm)   Pulse 66   Ht 174 cm (68.5\")   Wt 90.3 kg (199 lb)   BMI 29.82 kg/m²       Physical Exam  HENT:      Head: Normocephalic.   Neck:      Vascular: No carotid bruit.   Cardiovascular:      Rate and Rhythm: Normal rate and regular rhythm.      Pulses: Normal pulses.      Heart sounds: Normal heart sounds. Murmur heard.   Pulmonary:      Effort: Pulmonary effort is normal.      Breath sounds: Normal breath sounds.   Musculoskeletal:      Cervical back: Neck supple.      Right lower leg: No edema.      Left lower leg: No edema.   Skin:     General: Skin is dry.   Neurological:      Mental Status: He is alert and " "oriented to person, place, and time.   Psychiatric:         Behavior: Behavior normal.       Result Review :   The following data was reviewed by: RUSS Weber on 11/06/2023:  proBNP   Date Value Ref Range Status   08/29/2023 235.0 0.0 - 1,800.0 pg/mL Final     CMP          8/29/2023    11:18   CMP   Glucose 125    BUN 28    Creatinine 1.60    EGFR 43.0    Sodium 139    Potassium 4.5    Chloride 100    Calcium 9.9    Total Protein 7.1    Albumin 4.4    Globulin 2.7    Total Bilirubin 0.9    Alkaline Phosphatase 78    AST (SGOT) 17    ALT (SGPT) 16    Albumin/Globulin Ratio 1.6    BUN/Creatinine Ratio 17.5    Anion Gap 12.0      CBC w/diff          8/29/2023    11:18   CBC w/Diff   WBC 8.26    RBC 4.27    Hemoglobin 14.7    Hematocrit 42.6    MCV 99.8    MCH 34.4    MCHC 34.5    RDW 11.9    Platelets 265    Neutrophil Rel % 60.3    Immature Granulocyte Rel % 0.2    Lymphocyte Rel % 29.9    Monocyte Rel % 7.1    Eosinophil Rel % 2.1    Basophil Rel % 0.4       Lab Results   Component Value Date    TSH 2.690 08/29/2023      Lab Results   Component Value Date    FREET4 1.11 08/29/2023      No results found for: \"DDIMERQUANT\"  Magnesium   Date Value Ref Range Status   10/13/2020 1.8 1.6 - 2.3 mg/dL Final      No results found for: \"DIGOXIN\"   Lab Results   Component Value Date    TROPONINT <0.010 04/12/2022           Lipid Panel          8/29/2023    11:18   Lipid Panel   Total Cholesterol 181    Triglycerides 111    HDL Cholesterol 76    VLDL Cholesterol 19    LDL Cholesterol  86    LDL/HDL Ratio 1.09        Results for orders placed during the hospital encounter of 09/21/23    Adult Transthoracic Echo Complete W/ Cont if Necessary Per Protocol    Interpretation Summary    Left ventricular systolic function is normal. Left ventricular ejection fraction appears to be 61 - 65%.    Left ventricular wall thickness is consistent with mild concentric hypertrophy.    Left ventricular diastolic function is consistent " with (grade I) impaired relaxation.    There is moderate calcification of aortic valve leaflets.  Moderate aortic valve stenosis is present.  The peak and mean gradients across the aortic valve is 40/24 mmHg.    Mild mitral valve regurgitation is present.    Estimated right ventricular systolic pressure from tricuspid regurgitation is normal (<35 mmHg).         Assessment and Plan    Diagnoses and all orders for this visit:    1. CAD S/P percutaneous coronary angioplasty (Primary)  Start isosorbide 30 mg daily and we will see about getting him on the cardiac catheterization schedule with Dr. Randolph soon.  Continue aspirin 81 mg daily and metoprolol 50 mg daily.      2. Aortic stenosis, moderate & 3. Mild mitral regurgitation  He is having progressively worsening shortness of breath, repeat echocardiogram in 6 months to reassess valve function.  -     Adult Transthoracic Echo Complete W/ Cont if Necessary Per Protocol; Future      Other orders  -     isosorbide mononitrate (IMDUR) 30 MG 24 hr tablet; Take 1 tablet by mouth Daily.  Dispense: 30 tablet; Refill: 3            Follow Up   Return in about 6 months (around 5/6/2024) for Follow up with Dr Randolph.    Patient was given instructions and counseling regarding his condition or for health maintenance advice. Please see specific information pulled into the AVS if appropriate.     Rudy Moore  reports that he quit smoking about 22 years ago. His smoking use included cigarettes. He started smoking about 64 years ago. He has a 84.00 pack-year smoking history. He has never used smokeless tobacco..          Mallory Duong, RUSS  11/06/23  11:05 EST    Dictated Utilizing Dragon Dictation

## 2023-11-27 ENCOUNTER — OFFICE VISIT (OUTPATIENT)
Dept: FAMILY MEDICINE CLINIC | Facility: CLINIC | Age: 83
End: 2023-11-27
Payer: MEDICARE

## 2023-11-27 VITALS
OXYGEN SATURATION: 94 % | DIASTOLIC BLOOD PRESSURE: 70 MMHG | WEIGHT: 200.4 LBS | TEMPERATURE: 98 F | BODY MASS INDEX: 30.03 KG/M2 | SYSTOLIC BLOOD PRESSURE: 120 MMHG | HEART RATE: 93 BPM

## 2023-11-27 DIAGNOSIS — I34.0 MILD MITRAL REGURGITATION: ICD-10-CM

## 2023-11-27 DIAGNOSIS — M48.10 DISH (DIFFUSE IDIOPATHIC SKELETAL HYPEROSTOSIS): Primary | ICD-10-CM

## 2023-11-27 DIAGNOSIS — M79.18 MUSCULOSKELETAL PAIN: ICD-10-CM

## 2023-11-27 DIAGNOSIS — I25.10 CAD S/P PERCUTANEOUS CORONARY ANGIOPLASTY: ICD-10-CM

## 2023-11-27 DIAGNOSIS — Z98.61 CAD S/P PERCUTANEOUS CORONARY ANGIOPLASTY: ICD-10-CM

## 2023-11-27 DIAGNOSIS — I35.0 AORTIC STENOSIS, MODERATE: ICD-10-CM

## 2023-11-27 DIAGNOSIS — N18.31 STAGE 3A CHRONIC KIDNEY DISEASE: ICD-10-CM

## 2023-11-27 DIAGNOSIS — Z79.899 HIGH RISK MEDICATION USE: ICD-10-CM

## 2023-11-27 PROCEDURE — 1159F MED LIST DOCD IN RCRD: CPT | Performed by: NURSE PRACTITIONER

## 2023-11-27 PROCEDURE — 1160F RVW MEDS BY RX/DR IN RCRD: CPT | Performed by: NURSE PRACTITIONER

## 2023-11-27 PROCEDURE — 3078F DIAST BP <80 MM HG: CPT | Performed by: NURSE PRACTITIONER

## 2023-11-27 PROCEDURE — 99214 OFFICE O/P EST MOD 30 MIN: CPT | Performed by: NURSE PRACTITIONER

## 2023-11-27 PROCEDURE — 3074F SYST BP LT 130 MM HG: CPT | Performed by: NURSE PRACTITIONER

## 2023-11-27 NOTE — PROGRESS NOTES
Chief Complaint  Pain and Follow-up (Follow-up from cardiology)      History of Present Illness  Rudy Moore is a 83 y.o. male who presents to [facility] with a past medical history of  Past Medical History:   Diagnosis Date    CAD S/P percutaneous coronary angioplasty 05/05/2022    Cataract     Diabetes mellitus, type 2     Essential hypertension 07/23/2020    GERD (gastroesophageal reflux disease)     History of coronary angioplasty with insertion of stent     Hyperlipidemia     Kidney stones     Peripheral arterial disease 07/23/2020    Seasonal allergies      Elliott presents to the office today for follow up -     Since seeing me last he saw cardiology for ongoing SOB - he had an echocardiogram and stress test - Stress test showed a small area of moderate perfusion defect in the basal inferior segment with partial reversibility on resting images indicating prior infarct with jailene-infarct ischemia. Echocardiogram showed moderate aortic stenosis and mild mitral regurgitation. Cardiology placed him on isosorbide to see if this will help his chest pain - but if not improving then they will do another cardiac catheterization. He did have an episode of chest pain on Thanksgiving, but he forgot to take his medication per his wife.  He describes the episode as mild and states that he has been doing well with chest pain otherwise.  He denies palpitations, headaches, dizziness, or lower extremity edema.  Shortness of breath is ongoing, but stable.    Additionally, he is here for his 3-month follow-up for pain management.  He has chronic musculoskeletal pain secondary to DISH.  He has stage III chronic kidney disease and cannot take NSAIDs.  He has been taking tramadol 100 mg twice daily for several months now.  He also takes Robaxin as needed.  He denies any adverse side effects with use of tramadol or Robaxin.  For the most part, he feels his pain is managed by this combination.   He states that he is certainly better than he was prior to starting the medication.    PEG: A Three-Item Scale Assessing Pain Intensity and Interference  0 being no pain 10 pain as bad as you can imagine  What number best describes your pain on average in the past week? 7-8   2.  What number best describes how, during the past week, pain has interfered with your enjoyment of life? - unable to provide number - states it interferes 'a whole lot'   3.  What number best describes how, during the past week, pain has interfered with your general activity? states it interferes 'a whole lot' - he states 'can't hardly do anything anymore like I used to'.       Objective   Vital Signs:   Vitals:    11/27/23 1036   BP: 120/70   Pulse: 93   Temp: 98 °F (36.7 °C)   SpO2: 94%   Weight: 90.9 kg (200 lb 6.4 oz)     Body mass index is 30.03 kg/m².    Wt Readings from Last 3 Encounters:   11/27/23 90.9 kg (200 lb 6.4 oz)   11/06/23 90.3 kg (199 lb)   09/19/23 90.3 kg (199 lb)     BP Readings from Last 3 Encounters:   11/27/23 120/70   11/06/23 147/61   09/19/23 143/62       Health Maintenance   Topic Date Due    ZOSTER VACCINE (2 of 3) 04/12/2010    DIABETIC EYE EXAM  08/06/2021    COVID-19 Vaccine (6 - 2023-24 season) 09/01/2023    HEMOGLOBIN A1C  02/29/2024    ANNUAL WELLNESS VISIT  05/30/2024    BMI FOLLOWUP  05/30/2024    LIPID PANEL  08/29/2024    URINE MICROALBUMIN  08/29/2024    TDAP/TD VACCINES (2 - Td or Tdap) 01/27/2032    INFLUENZA VACCINE  Completed    Pneumococcal Vaccine 65+  Completed       Physical Exam  Vitals reviewed.   Constitutional:       General: He is not in acute distress.     Appearance: He is well-developed. He is obese.   HENT:      Head: Normocephalic and atraumatic.   Eyes:      General: No scleral icterus.        Right eye: No discharge.         Left eye: No discharge.      Extraocular Movements: Extraocular movements intact.      Conjunctiva/sclera: Conjunctivae normal.   Neck:      Trachea: Trachea  normal.   Cardiovascular:      Rate and Rhythm: Normal rate and regular rhythm.      Pulses: Normal pulses.      Heart sounds: Murmur heard.      Systolic murmur is present with a grade of 2/6.   Pulmonary:      Effort: Pulmonary effort is normal. No respiratory distress.      Breath sounds: Normal breath sounds. No wheezing, rhonchi or rales.   Musculoskeletal:         General: Normal range of motion.      Cervical back: Normal range of motion.      Right lower leg: No edema.      Left lower leg: No edema.   Skin:     General: Skin is warm and dry.   Neurological:      Mental Status: He is alert and oriented to person, place, and time.   Psychiatric:         Mood and Affect: Mood and affect normal.         Behavior: Behavior normal.         Thought Content: Thought content normal.         Judgment: Judgment normal.         Result Review :  The following data was reviewed by: RUSS Herrera on 11/27/2023:    Common labs          3/21/2023    15:01 7/24/2023    11:57 8/29/2023    11:18   Common Labs   Glucose 102  170  125    BUN 20  24  28    Creatinine 1.45  1.77  1.60    Sodium 140  142  139    Potassium 4.7  5.0  4.5    Chloride 101  104  100    Calcium 9.6  9.6  9.9    Albumin 4.0  4.1  4.4    Total Bilirubin 0.7  0.8  0.9    Alkaline Phosphatase 74  67  78    AST (SGOT) 24  19  17    ALT (SGPT) 24  21  16    WBC  6.55  8.26    Hemoglobin  13.2  14.7    Hematocrit  38.3  42.6    Platelets  217  265    Total Cholesterol   181    Triglycerides   111    HDL Cholesterol   76    LDL Cholesterol    86    Hemoglobin A1C 6.00  6.00  6.30    Microalbumin, Urine   <1.2      Pain Management Panel  More data exists         Latest Ref Rng & Units 8/29/2023 2/9/2023   Pain Management Panel   Creatinine, Urine mg/dL 178.2  191.7    Barbiturates Screen, Urine Negative Negative  -   Benzodiazepine Screen, Urine Negative Negative  -   Cocaine Screen, Urine Negative Negative  -   Fentanyl, Urine Negative Negative  -    Methadone Screen , Urine Negative Negative  -     XR Chest PA & Lateral (In Office)    Result Date: 8/29/2023    1. No acute cardiopulmonary abnormality. 2. Extensive aortic atherosclerotic calcification.      BRIAN EL MD       Electronically Signed and Approved By: BRIAN EL MD on 8/29/2023 at 14:36             Adult Transthoracic Echo Complete W/ Cont if Necessary Per Protocol (09/21/2023 14:55)    Stress Test With Myocardial Perfusion One Day (10/30/2023 12:37)    Procedures        Assessment and Plan   Diagnoses and all orders for this visit:    1. DISH (diffuse idiopathic skeletal hyperostosis) (Primary)    2. Musculoskeletal pain    3. High risk medication use    4. Stage 3a chronic kidney disease    5. CAD S/P percutaneous coronary angioplasty    6. Aortic stenosis, moderate    7. Mild mitral regurgitation                  FOLLOW UP  Return in about 3 months (around 2/27/2024) for Next scheduled follow up, medication refills and fasting labs.    He is about 1 week too early for a refill of his tramadol.  He will call next week on Tuesday and we will refill at that time, continuing the same dose of tramadol, 100 mg twice daily.  He will continue Robaxin as needed as well.    He has a follow-up with cardiology in May with an echo 2 to 3 weeks prior.  He will contact cardiology with any ongoing/recurrent chest pain for a sooner appointment.  He is to go to the emergency room with sustained chest pain.  Voices understanding.  Continue Imdur for now.      Patient was given instructions and counseling regarding his condition or for health maintenance advice. Please see specific information pulled into the AVS if appropriate.       Kim Dailey, APRN  11/27/23  11:15 EST    CURRENT & DISCONTINUED MEDICATIONS  Current Outpatient Medications   Medication Instructions    aspirin 81 mg, Oral, Daily    atorvastatin (LIPITOR) 20 mg, Oral, Nightly    cilostazol (PLETAL) 100 mg, Oral, 2 Times Daily     glucose blood (True Metrix Blood Glucose Test) test strip 1 each, Other, Daily, Use as instructed    isosorbide mononitrate (IMDUR) 30 mg, Oral, Daily    lisinopril (PRINIVIL,ZESTRIL) 40 mg, Oral, Daily    metFORMIN (GLUCOPHAGE) 850 mg, Oral, Daily With Dinner    methocarbamol (ROBAXIN) 500 mg, Oral, 4 Times Daily PRN    metoprolol tartrate (LOPRESSOR) 50 mg, Oral, Daily    multivitamin with minerals tablet tablet 1 tablet, Oral, Daily    traMADol (ULTRAM) 100 mg, Oral, 2 Times Daily PRN       There are no discontinued medications.

## 2023-12-06 DIAGNOSIS — M79.18 MUSCULOSKELETAL PAIN: ICD-10-CM

## 2023-12-06 DIAGNOSIS — M48.10 DISH (DIFFUSE IDIOPATHIC SKELETAL HYPEROSTOSIS): ICD-10-CM

## 2023-12-06 DIAGNOSIS — Z79.899 HIGH RISK MEDICATION USE: ICD-10-CM

## 2023-12-06 DIAGNOSIS — N18.31 STAGE 3A CHRONIC KIDNEY DISEASE: ICD-10-CM

## 2023-12-06 RX ORDER — TRAMADOL HYDROCHLORIDE 50 MG/1
100 TABLET ORAL 2 TIMES DAILY PRN
Qty: 120 TABLET | Refills: 2 | Status: SHIPPED | OUTPATIENT
Start: 2023-12-06

## 2024-02-26 ENCOUNTER — OFFICE VISIT (OUTPATIENT)
Dept: FAMILY MEDICINE CLINIC | Facility: CLINIC | Age: 84
End: 2024-02-26
Payer: MEDICARE

## 2024-02-26 VITALS
DIASTOLIC BLOOD PRESSURE: 90 MMHG | BODY MASS INDEX: 30.07 KG/M2 | HEART RATE: 96 BPM | HEIGHT: 69 IN | OXYGEN SATURATION: 98 % | WEIGHT: 203 LBS | SYSTOLIC BLOOD PRESSURE: 140 MMHG

## 2024-02-26 DIAGNOSIS — N18.31 TYPE 2 DIABETES MELLITUS WITH STAGE 3A CHRONIC KIDNEY DISEASE, WITHOUT LONG-TERM CURRENT USE OF INSULIN: Primary | ICD-10-CM

## 2024-02-26 DIAGNOSIS — M79.18 MUSCULOSKELETAL PAIN: ICD-10-CM

## 2024-02-26 DIAGNOSIS — E78.5 HYPERLIPIDEMIA, UNSPECIFIED HYPERLIPIDEMIA TYPE: ICD-10-CM

## 2024-02-26 DIAGNOSIS — N18.31 STAGE 3A CHRONIC KIDNEY DISEASE: ICD-10-CM

## 2024-02-26 DIAGNOSIS — I10 ESSENTIAL HYPERTENSION: ICD-10-CM

## 2024-02-26 DIAGNOSIS — E11.22 TYPE 2 DIABETES MELLITUS WITH STAGE 3A CHRONIC KIDNEY DISEASE, WITHOUT LONG-TERM CURRENT USE OF INSULIN: Primary | ICD-10-CM

## 2024-02-26 DIAGNOSIS — M48.10 DISH (DIFFUSE IDIOPATHIC SKELETAL HYPEROSTOSIS): ICD-10-CM

## 2024-02-26 DIAGNOSIS — Z12.5 PROSTATE CANCER SCREENING: ICD-10-CM

## 2024-02-26 DIAGNOSIS — I73.9 PERIPHERAL VASCULAR DISEASE WITH CLAUDICATION: ICD-10-CM

## 2024-02-26 DIAGNOSIS — Z79.899 HIGH RISK MEDICATION USE: ICD-10-CM

## 2024-02-26 LAB
ALBUMIN SERPL-MCNC: 4.3 G/DL (ref 3.5–5.2)
ALBUMIN UR-MCNC: 2.3 MG/DL
ALBUMIN/GLOB SERPL: 1.6 G/DL
ALP SERPL-CCNC: 70 U/L (ref 39–117)
ALT SERPL W P-5'-P-CCNC: 17 U/L (ref 1–41)
ANION GAP SERPL CALCULATED.3IONS-SCNC: 13 MMOL/L (ref 5–15)
AST SERPL-CCNC: 18 U/L (ref 1–40)
BASOPHILS # BLD AUTO: 0.03 10*3/MM3 (ref 0–0.2)
BASOPHILS NFR BLD AUTO: 0.4 % (ref 0–1.5)
BILIRUB SERPL-MCNC: 0.9 MG/DL (ref 0–1.2)
BUN SERPL-MCNC: 24 MG/DL (ref 8–23)
BUN/CREAT SERPL: 13.6 (ref 7–25)
CALCIUM SPEC-SCNC: 9.8 MG/DL (ref 8.6–10.5)
CHLORIDE SERPL-SCNC: 104 MMOL/L (ref 98–107)
CHOLEST SERPL-MCNC: 198 MG/DL (ref 0–200)
CO2 SERPL-SCNC: 27 MMOL/L (ref 22–29)
CREAT SERPL-MCNC: 1.76 MG/DL (ref 0.76–1.27)
CREAT UR-MCNC: 156.1 MG/DL
DEPRECATED RDW RBC AUTO: 42.2 FL (ref 37–54)
EGFRCR SERPLBLD CKD-EPI 2021: 38.1 ML/MIN/1.73
EOSINOPHIL # BLD AUTO: 0.12 10*3/MM3 (ref 0–0.4)
EOSINOPHIL NFR BLD AUTO: 1.6 % (ref 0.3–6.2)
ERYTHROCYTE [DISTWIDTH] IN BLOOD BY AUTOMATED COUNT: 11.9 % (ref 12.3–15.4)
GLOBULIN UR ELPH-MCNC: 2.7 GM/DL
GLUCOSE SERPL-MCNC: 114 MG/DL (ref 65–99)
HBA1C MFR BLD: 5.8 % (ref 4.8–5.6)
HCT VFR BLD AUTO: 40.9 % (ref 37.5–51)
HDLC SERPL-MCNC: 80 MG/DL (ref 40–60)
HGB BLD-MCNC: 13.8 G/DL (ref 13–17.7)
IMM GRANULOCYTES # BLD AUTO: 0.03 10*3/MM3 (ref 0–0.05)
IMM GRANULOCYTES NFR BLD AUTO: 0.4 % (ref 0–0.5)
LDLC SERPL CALC-MCNC: 87 MG/DL (ref 0–100)
LDLC/HDLC SERPL: 1.01 {RATIO}
LYMPHOCYTES # BLD AUTO: 1.98 10*3/MM3 (ref 0.7–3.1)
LYMPHOCYTES NFR BLD AUTO: 26.5 % (ref 19.6–45.3)
MCH RBC QN AUTO: 33.2 PG (ref 26.6–33)
MCHC RBC AUTO-ENTMCNC: 33.7 G/DL (ref 31.5–35.7)
MCV RBC AUTO: 98.3 FL (ref 79–97)
MICROALBUMIN/CREAT UR: 14.7 MG/G (ref 0–29)
MONOCYTES # BLD AUTO: 0.49 10*3/MM3 (ref 0.1–0.9)
MONOCYTES NFR BLD AUTO: 6.6 % (ref 5–12)
NEUTROPHILS NFR BLD AUTO: 4.81 10*3/MM3 (ref 1.7–7)
NEUTROPHILS NFR BLD AUTO: 64.5 % (ref 42.7–76)
NRBC BLD AUTO-RTO: 0 /100 WBC (ref 0–0.2)
PLATELET # BLD AUTO: 237 10*3/MM3 (ref 140–450)
PMV BLD AUTO: 9.7 FL (ref 6–12)
POTASSIUM SERPL-SCNC: 5.5 MMOL/L (ref 3.5–5.2)
PROT SERPL-MCNC: 7 G/DL (ref 6–8.5)
PSA SERPL-MCNC: 0.04 NG/ML (ref 0–4)
RBC # BLD AUTO: 4.16 10*6/MM3 (ref 4.14–5.8)
SODIUM SERPL-SCNC: 144 MMOL/L (ref 136–145)
T4 FREE SERPL-MCNC: 1.06 NG/DL (ref 0.93–1.7)
TRIGL SERPL-MCNC: 185 MG/DL (ref 0–150)
TSH SERPL DL<=0.05 MIU/L-ACNC: 2.65 UIU/ML (ref 0.27–4.2)
VLDLC SERPL-MCNC: 31 MG/DL (ref 5–40)
WBC NRBC COR # BLD AUTO: 7.46 10*3/MM3 (ref 3.4–10.8)

## 2024-02-26 PROCEDURE — 80053 COMPREHEN METABOLIC PANEL: CPT | Performed by: NURSE PRACTITIONER

## 2024-02-26 PROCEDURE — 83036 HEMOGLOBIN GLYCOSYLATED A1C: CPT | Performed by: NURSE PRACTITIONER

## 2024-02-26 PROCEDURE — 85025 COMPLETE CBC W/AUTO DIFF WBC: CPT | Performed by: NURSE PRACTITIONER

## 2024-02-26 PROCEDURE — 84439 ASSAY OF FREE THYROXINE: CPT | Performed by: NURSE PRACTITIONER

## 2024-02-26 PROCEDURE — 82043 UR ALBUMIN QUANTITATIVE: CPT | Performed by: NURSE PRACTITIONER

## 2024-02-26 PROCEDURE — 80061 LIPID PANEL: CPT | Performed by: NURSE PRACTITIONER

## 2024-02-26 PROCEDURE — 82570 ASSAY OF URINE CREATININE: CPT | Performed by: NURSE PRACTITIONER

## 2024-02-26 PROCEDURE — 84443 ASSAY THYROID STIM HORMONE: CPT | Performed by: NURSE PRACTITIONER

## 2024-02-26 PROCEDURE — G0103 PSA SCREENING: HCPCS | Performed by: NURSE PRACTITIONER

## 2024-02-26 RX ORDER — METOPROLOL TARTRATE 50 MG/1
50 TABLET, FILM COATED ORAL DAILY
Qty: 90 TABLET | Refills: 1 | Status: SHIPPED | OUTPATIENT
Start: 2024-02-26 | End: 2024-02-26 | Stop reason: SDUPTHER

## 2024-02-26 RX ORDER — LISINOPRIL 40 MG/1
40 TABLET ORAL DAILY
Qty: 90 TABLET | Refills: 1 | Status: SHIPPED | OUTPATIENT
Start: 2024-02-26 | End: 2024-02-26 | Stop reason: SDUPTHER

## 2024-02-26 RX ORDER — ATORVASTATIN CALCIUM 20 MG/1
20 TABLET, FILM COATED ORAL NIGHTLY
Qty: 90 TABLET | Refills: 1 | Status: SHIPPED | OUTPATIENT
Start: 2024-02-26 | End: 2024-02-26 | Stop reason: SDUPTHER

## 2024-02-26 RX ORDER — CILOSTAZOL 100 MG/1
100 TABLET ORAL 2 TIMES DAILY
Qty: 180 TABLET | Refills: 1 | Status: SHIPPED | OUTPATIENT
Start: 2024-02-26 | End: 2024-02-26 | Stop reason: SDUPTHER

## 2024-02-26 NOTE — PROGRESS NOTES
Chief Complaint  Diabetes and Hypertension    History of Present Illness  Rudy Moore is a 83 y.o. male who presents to [facility] with a past medical history of    Past Medical History:   Diagnosis Date    CAD S/P percutaneous coronary angioplasty 05/05/2022    Cataract     Diabetes mellitus, type 2     Essential hypertension 07/23/2020    GERD (gastroesophageal reflux disease)     History of coronary angioplasty with insertion of stent     Hyperlipidemia     Kidney stones     Peripheral arterial disease 07/23/2020    Seasonal allergies      Elliott presents to the office today accompanied by his wife, Angelika, for medication refills/3 month follow up.     He is currently taking lisinopril 40mg once daily and Lopressor 50mg, as well as Imdur 30mg. He is seeing cardiology - Dr. Randolph and RUSS Edwards. His next appointment is with Dr. Randolph in May, with echo a few weeks prior. He does not check his blood pressure at home. He has chest pain quite often - he states if he lays on his back his chest doesn't hurt. If he lays on his side it does. He states that the chest pain is why they gave him Imdur. He thinks it may have alleviated the chest pain to a degree. His wife states he isn't complaining as much. He denies any palpitations. He is not having headaches or dizziness. He is not having LE edema. He gets out of breath quickly with exertion.     He is checking BG daily - states 'staying pretty normal' - between 100-120. He eats well per his wife - he does not drink any sweets and limits intake of sweets. He eats a good breakfast and supper and rarely snacks. He reports being UTD on his eye exam.     He takes Lipitor for dyslipidemia. No complaints of myalgia with use. He has had a right carotid endarterectomy with Dr. Parnell in 08/2020 - as well as bilateral femoral endarterectomies with iliac stenting 10/2020. He still takes Pletal with good control of  "claudication.    He states he has cut his tramadol down some - he was taking 4 pills per day, 100mg BID. But he developed some mild constipation so he has recently cut down to 2 per day - his wife has tried to increase the fiber in his diet. He has made the change in the past month.     PEG: A Three-Item Scale Assessing Pain Intensity and Interference  0 being no pain 10 pain as bad as you can imagine  What number best describes your pain on average in the past week? 5  2.  What number best describes how, during the past week, pain has interfered with your enjoyment of life? 0  3.  What number best describes how, during the past week, pain has interfered with your general activity? 5      Objective   Vital Signs:   Vitals:    02/26/24 0932   BP: 140/90   Pulse: 96   SpO2: 98%   Weight: 92.1 kg (203 lb)   Height: 175.3 cm (69\")     Body mass index is 29.98 kg/m².    Wt Readings from Last 3 Encounters:   02/26/24 92.1 kg (203 lb)   11/27/23 90.9 kg (200 lb 6.4 oz)   11/06/23 90.3 kg (199 lb)     BP Readings from Last 3 Encounters:   02/26/24 140/90   11/27/23 120/70   11/06/23 147/61       Health Maintenance   Topic Date Due    ZOSTER VACCINE (2 of 3) 04/12/2010    DIABETIC EYE EXAM  08/06/2021    COVID-19 Vaccine (6 - 2023-24 season) 09/01/2023    HEMOGLOBIN A1C  02/29/2024    ANNUAL WELLNESS VISIT  05/30/2024    BMI FOLLOWUP  05/30/2024    LIPID PANEL  08/29/2024    URINE MICROALBUMIN  08/29/2024    TDAP/TD VACCINES (2 - Td or Tdap) 01/27/2032    RSV Vaccine - Adults  Completed    INFLUENZA VACCINE  Completed    Pneumococcal Vaccine 65+  Completed       Physical Exam  Vitals reviewed.   Constitutional:       General: He is not in acute distress.     Appearance: He is well-developed and overweight.   HENT:      Head: Normocephalic and atraumatic.   Eyes:      General: No scleral icterus.        Right eye: No discharge.         Left eye: No discharge.      Extraocular Movements: Extraocular movements intact.      " Conjunctiva/sclera: Conjunctivae normal.   Neck:      Thyroid: No thyroid mass, thyromegaly or thyroid tenderness.      Vascular: No carotid bruit.      Trachea: Trachea normal.   Cardiovascular:      Rate and Rhythm: Normal rate and regular rhythm.      Pulses: Normal pulses.      Heart sounds: Murmur heard.      Systolic murmur is present with a grade of 3/6.   Pulmonary:      Effort: Pulmonary effort is normal.      Breath sounds: Normal breath sounds.   Abdominal:      General: Bowel sounds are normal. There is no distension.      Palpations: Abdomen is soft. There is no mass.      Tenderness: There is no abdominal tenderness.   Musculoskeletal:         General: Normal range of motion.      Cervical back: Normal range of motion and neck supple. No tenderness.      Right lower leg: No edema.      Left lower leg: No edema.   Lymphadenopathy:      Cervical: No cervical adenopathy.   Skin:     General: Skin is warm and dry.   Neurological:      Mental Status: He is alert and oriented to person, place, and time.   Psychiatric:         Mood and Affect: Mood and affect normal.         Behavior: Behavior normal.         Thought Content: Thought content normal.         Judgment: Judgment normal.         Result Review :  The following data was reviewed by: RUSS Herrera on 02/26/2024:    No visits with results within 1 Month(s) from this visit.   Latest known visit with results is:   Hospital Outpatient Visit on 10/30/2023   Component Date Value     CV STRESS PROTOCOL 1 10/30/2023 Pharmacologic     Stage 1 10/30/2023 1.0     Duration Min Stage 1 10/30/2023 0     Duration Sec Stage 1 10/30/2023 10     Stress Dose Regadenoson * 10/30/2023 0.40     Stress Comments Stage 1 10/30/2023 10 sec bolus injection     Baseline HR 10/30/2023 85     Baseline BP 10/30/2023 177/88     O2 sat rest 10/30/2023 93     Target HR (85%) 10/30/2023 118     Max. Pred. HR (100%) 10/30/2023 139     HR Stage 1 10/30/2023 109     BP  Stage 1 10/30/2023 181/87     O2 Stage 1 10/30/2023 94     Im Post Recovery HR 10/30/2023 107     Im Post BP 10/30/2023 174/62     Im Post O2 Sats 10/30/2023 94     3 Min Post Recovery HR 10/30/2023 106     3 Min Post BP 10/30/2023 188/66     3 Minute Recovery O2 Sat 10/30/2023 96     6 Min  Recovery BPM 10/30/2023 103     6 Min Recovery Blood Pre* 10/30/2023 200/78     6 Min Recovery O2 Sat 10/30/2023 95     Peak HR 10/30/2023 109     Peak BP 10/30/2023 181/87     O2 sat peak 10/30/2023 94     Recovery HR 10/30/2023 103     Recovery BP 10/30/2023 200/78     Recovery O2 10/30/2023 95     Percent Max Pred HR 10/30/2023 78.42     Percent Target HR 10/30/2023 92     Nuc Stress EF 10/30/2023 48     BH CV REST NUCLEAR ISOTO* 10/30/2023 9.0     BH CV STRESS NUCLEAR ISO* 10/30/2023 33.4      Common labs          3/21/2023    15:01 7/24/2023    11:57 8/29/2023    11:18   Common Labs   Glucose 102  170  125    BUN 20  24  28    Creatinine 1.45  1.77  1.60    Sodium 140  142  139    Potassium 4.7  5.0  4.5    Chloride 101  104  100    Calcium 9.6  9.6  9.9    Albumin 4.0  4.1  4.4    Total Bilirubin 0.7  0.8  0.9    Alkaline Phosphatase 74  67  78    AST (SGOT) 24  19  17    ALT (SGPT) 24  21  16    WBC  6.55  8.26    Hemoglobin  13.2  14.7    Hematocrit  38.3  42.6    Platelets  217  265    Total Cholesterol   181    Triglycerides   111    HDL Cholesterol   76    LDL Cholesterol    86    Hemoglobin A1C 6.00  6.00  6.30    Microalbumin, Urine   <1.2        Last Urine Toxicity  More data exists         Latest Ref Rng & Units 8/29/2023 2/9/2023   LAST URINE TOXICITY RESULTS   Creatinine, Urine mg/dL 178.2  191.7    Barbiturates Screen, Urine Negative Negative  -   Benzodiazepine Screen, Urine Negative Negative  -   Cocaine Screen, Urine Negative Negative  -   Fentanyl, Urine Negative Negative  -   Methadone Screen , Urine Negative Negative  -         Procedures        Assessment and Plan   Diagnoses and all orders for this  visit:    1. Type 2 diabetes mellitus with stage 3a chronic kidney disease, without long-term current use of insulin (Primary)  Comments:  Eye exam UTD, stopped metformin  Orders:  -     CBC Auto Differential  -     Comprehensive Metabolic Panel  -     Hemoglobin A1c  -     Lipid Panel  -     TSH+Free T4  -     Microalbumin / Creatinine Urine Ratio - Urine, Clean Catch    2. Essential hypertension  -     lisinopril (PRINIVIL,ZESTRIL) 40 MG tablet; Take 1 tablet by mouth Daily.  Dispense: 90 tablet; Refill: 1  -     metoprolol tartrate (LOPRESSOR) 50 MG tablet; Take 1 tablet by mouth Daily.  Dispense: 90 tablet; Refill: 1  -     CBC Auto Differential  -     Comprehensive Metabolic Panel  -     Lipid Panel  -     TSH+Free T4  -     Microalbumin / Creatinine Urine Ratio - Urine, Clean Catch    3. Hyperlipidemia, unspecified hyperlipidemia type  -     atorvastatin (LIPITOR) 20 MG tablet; Take 1 tablet by mouth Every Night.  Dispense: 90 tablet; Refill: 1  -     Comprehensive Metabolic Panel  -     Lipid Panel    4. Peripheral vascular disease with claudication  -     cilostazol (PLETAL) 100 MG tablet; Take 1 tablet by mouth 2 (Two) Times a Day.  Dispense: 180 tablet; Refill: 1    5. DISH (diffuse idiopathic skeletal hyperostosis)    6. Musculoskeletal pain    7. Stage 3a chronic kidney disease    8. High risk medication use    9. Prostate cancer screening  -     PSA Screen                  FOLLOW UP  Return in about 3 months (around 5/31/2024) for Medicare Wellness.    We will get fasting labs today.  Check hemoglobin A1c to see if other antihyperglycemic agent is needed.    In regards to his pain medication, he seems to be doing well.  He has reduced the amount of tramadol he is taking thus he will let me know when a refill is needed.  He currently has 1 refill remaining on his most recent prescription.    Patient was given instructions and counseling regarding his condition or for health maintenance advice. Please see  specific information pulled into the AVS if appropriate.       Kim Dailey, APRN  02/26/24  10:02 EST    CURRENT & DISCONTINUED MEDICATIONS  Current Outpatient Medications   Medication Instructions    aspirin 81 mg, Oral, Daily    atorvastatin (LIPITOR) 20 mg, Oral, Nightly    cilostazol (PLETAL) 100 mg, Oral, 2 Times Daily    glucose blood (True Metrix Blood Glucose Test) test strip 1 each, Other, Daily, Use as instructed    isosorbide mononitrate (IMDUR) 30 mg, Oral, Daily    lisinopril (PRINIVIL,ZESTRIL) 40 mg, Oral, Daily    metoprolol tartrate (LOPRESSOR) 50 mg, Oral, Daily    multivitamin with minerals tablet tablet 1 tablet, Oral, Daily    traMADol (ULTRAM) 100 mg, Oral, 2 Times Daily PRN       Medications Discontinued During This Encounter   Medication Reason    methocarbamol (Robaxin) 500 MG tablet *Therapy completed    metFORMIN (Glucophage) 850 MG tablet Patient Discharge    atorvastatin (LIPITOR) 20 MG tablet Reorder    cilostazol (PLETAL) 100 MG tablet Reorder    lisinopril (PRINIVIL,ZESTRIL) 40 MG tablet Reorder    metoprolol tartrate (LOPRESSOR) 50 MG tablet Reorder

## 2024-02-26 NOTE — PROGRESS NOTES
Venipuncture Blood Specimen Collection  Venipuncture performed in RA by Lillie Tinsley with good hemostasis. Patient tolerated the procedure well without complications.   02/26/24   Lillie Tinsley

## 2024-02-27 DIAGNOSIS — E87.5 SERUM POTASSIUM ELEVATED: ICD-10-CM

## 2024-02-27 DIAGNOSIS — R71.8 OTHER ABNORMALITY OF RED BLOOD CELLS: Primary | ICD-10-CM

## 2024-02-27 DIAGNOSIS — N28.9 ABNORMAL KIDNEY FUNCTION: ICD-10-CM

## 2024-02-27 RX ORDER — ISOSORBIDE MONONITRATE 30 MG/1
30 TABLET, EXTENDED RELEASE ORAL DAILY
Qty: 30 TABLET | Refills: 3 | Status: SHIPPED | OUTPATIENT
Start: 2024-02-27

## 2024-03-06 ENCOUNTER — CLINICAL SUPPORT (OUTPATIENT)
Dept: FAMILY MEDICINE CLINIC | Facility: CLINIC | Age: 84
End: 2024-03-06
Payer: MEDICARE

## 2024-03-06 DIAGNOSIS — N28.9 ABNORMAL KIDNEY FUNCTION: ICD-10-CM

## 2024-03-06 DIAGNOSIS — E87.5 SERUM POTASSIUM ELEVATED: ICD-10-CM

## 2024-03-06 DIAGNOSIS — R71.8 OTHER ABNORMALITY OF RED BLOOD CELLS: ICD-10-CM

## 2024-03-06 LAB
ANION GAP SERPL CALCULATED.3IONS-SCNC: 13.6 MMOL/L (ref 5–15)
BUN SERPL-MCNC: 25 MG/DL (ref 8–23)
BUN/CREAT SERPL: 15.1 (ref 7–25)
CALCIUM SPEC-SCNC: 9.2 MG/DL (ref 8.6–10.5)
CHLORIDE SERPL-SCNC: 105 MMOL/L (ref 98–107)
CO2 SERPL-SCNC: 24.4 MMOL/L (ref 22–29)
CREAT SERPL-MCNC: 1.66 MG/DL (ref 0.76–1.27)
EGFRCR SERPLBLD CKD-EPI 2021: 40.9 ML/MIN/1.73
FOLATE SERPL-MCNC: >20 NG/ML (ref 4.78–24.2)
GLUCOSE SERPL-MCNC: 220 MG/DL (ref 65–99)
POTASSIUM SERPL-SCNC: 4.7 MMOL/L (ref 3.5–5.2)
SODIUM SERPL-SCNC: 143 MMOL/L (ref 136–145)
VIT B12 BLD-MCNC: 525 PG/ML (ref 211–946)

## 2024-03-06 PROCEDURE — 36415 COLL VENOUS BLD VENIPUNCTURE: CPT | Performed by: NURSE PRACTITIONER

## 2024-03-06 PROCEDURE — 82607 VITAMIN B-12: CPT | Performed by: NURSE PRACTITIONER

## 2024-03-06 PROCEDURE — 82746 ASSAY OF FOLIC ACID SERUM: CPT | Performed by: NURSE PRACTITIONER

## 2024-03-06 PROCEDURE — 80048 BASIC METABOLIC PNL TOTAL CA: CPT | Performed by: NURSE PRACTITIONER

## 2024-03-06 NOTE — PROGRESS NOTES
Venipuncture Blood Specimen Collection  Venipuncture performed in RT ARM by Lillie Tinsley with good hemostasis. Patient tolerated the procedure well without complications.   03/06/24   Lillie Tinsley

## 2024-04-25 DIAGNOSIS — Z79.899 HIGH RISK MEDICATION USE: ICD-10-CM

## 2024-04-25 DIAGNOSIS — N18.31 STAGE 3A CHRONIC KIDNEY DISEASE: ICD-10-CM

## 2024-04-25 DIAGNOSIS — M48.10 DISH (DIFFUSE IDIOPATHIC SKELETAL HYPEROSTOSIS): ICD-10-CM

## 2024-04-25 DIAGNOSIS — M79.18 MUSCULOSKELETAL PAIN: ICD-10-CM

## 2024-04-25 RX ORDER — TRAMADOL HYDROCHLORIDE 50 MG/1
100 TABLET ORAL 2 TIMES DAILY PRN
Qty: 120 TABLET | Refills: 0 | Status: SHIPPED | OUTPATIENT
Start: 2024-04-25

## 2024-04-29 RX ORDER — ISOSORBIDE MONONITRATE 30 MG/1
30 TABLET, EXTENDED RELEASE ORAL DAILY
Qty: 90 TABLET | Refills: 3 | Status: SHIPPED | OUTPATIENT
Start: 2024-04-29

## 2024-05-06 ENCOUNTER — HOSPITAL ENCOUNTER (OUTPATIENT)
Dept: CARDIOLOGY | Facility: HOSPITAL | Age: 84
Discharge: HOME | End: 2024-05-06
Admitting: NURSE PRACTITIONER
Payer: MEDICARE

## 2024-05-06 DIAGNOSIS — I35.0 AORTIC STENOSIS, MODERATE: ICD-10-CM

## 2024-05-06 LAB
AORTIC DIMENSIONLESS INDEX: 0.25 (DI)
BH CV ECHO MEAS - AO MAX PG: 40.2 MMHG
BH CV ECHO MEAS - AO MEAN PG: 24 MMHG
BH CV ECHO MEAS - AO ROOT DIAM: 2.9 CM
BH CV ECHO MEAS - AO V2 MAX: 317 CM/SEC
BH CV ECHO MEAS - AO V2 VTI: 70.1 CM
BH CV ECHO MEAS - AVA(I,D): 0.79 CM2
BH CV ECHO MEAS - EDV(CUBED): 122 ML
BH CV ECHO MEAS - EDV(MOD-SP2): 78.8 ML
BH CV ECHO MEAS - EDV(MOD-SP4): 56.4 ML
BH CV ECHO MEAS - EF(MOD-BP): 55.9 %
BH CV ECHO MEAS - EF(MOD-SP2): 55.8 %
BH CV ECHO MEAS - EF(MOD-SP4): 60.5 %
BH CV ECHO MEAS - ESV(CUBED): 33.4 ML
BH CV ECHO MEAS - ESV(MOD-SP2): 34.8 ML
BH CV ECHO MEAS - ESV(MOD-SP4): 22.3 ML
BH CV ECHO MEAS - FS: 35.1 %
BH CV ECHO MEAS - IVS/LVPW: 1.02 CM
BH CV ECHO MEAS - IVSD: 1.05 CM
BH CV ECHO MEAS - LA DIMENSION: 3.3 CM
BH CV ECHO MEAS - LAT PEAK E' VEL: 5.4 CM/SEC
BH CV ECHO MEAS - LV DIASTOLIC VOL/BSA (35-75): 27.1 CM2
BH CV ECHO MEAS - LV MASS(C)D: 189.4 GRAMS
BH CV ECHO MEAS - LV MAX PG: 2.25 MMHG
BH CV ECHO MEAS - LV MEAN PG: 1 MMHG
BH CV ECHO MEAS - LV SYSTOLIC VOL/BSA (12-30): 10.7 CM2
BH CV ECHO MEAS - LV V1 MAX: 75 CM/SEC
BH CV ECHO MEAS - LV V1 VTI: 17.7 CM
BH CV ECHO MEAS - LVIDD: 5 CM
BH CV ECHO MEAS - LVIDS: 3.2 CM
BH CV ECHO MEAS - LVOT AREA: 3.1 CM2
BH CV ECHO MEAS - LVOT DIAM: 2 CM
BH CV ECHO MEAS - LVPWD: 1.03 CM
BH CV ECHO MEAS - MED PEAK E' VEL: 7.3 CM/SEC
BH CV ECHO MEAS - MV A MAX VEL: 136 CM/SEC
BH CV ECHO MEAS - MV DEC TIME: 0.21 SEC
BH CV ECHO MEAS - MV E MAX VEL: 112 CM/SEC
BH CV ECHO MEAS - MV E/A: 0.82
BH CV ECHO MEAS - PA ACC TIME: 0.1 SEC
BH CV ECHO MEAS - PA V2 MAX: 93.1 CM/SEC
BH CV ECHO MEAS - SV(LVOT): 55.6 ML
BH CV ECHO MEAS - SV(MOD-SP2): 44 ML
BH CV ECHO MEAS - SV(MOD-SP4): 34.1 ML
BH CV ECHO MEAS - SVI(LVOT): 26.7 ML/M2
BH CV ECHO MEAS - SVI(MOD-SP2): 21.2 ML/M2
BH CV ECHO MEAS - SVI(MOD-SP4): 16.4 ML/M2
BH CV ECHO MEAS - TAPSE (>1.6): 2.03 CM
BH CV ECHO MEASUREMENTS AVERAGE E/E' RATIO: 17.64
BH CV XLRA - RV BASE: 4.3 CM
BH CV XLRA - RV MID: 3.4 CM
BH CV XLRA - TDI S': 14.5 CM/SEC
IVRT: 103 MS
LEFT ATRIUM VOLUME INDEX: 17.8 ML/M2

## 2024-05-06 PROCEDURE — 93306 TTE W/DOPPLER COMPLETE: CPT

## 2024-05-07 ENCOUNTER — TELEPHONE (OUTPATIENT)
Dept: CARDIOLOGY | Facility: CLINIC | Age: 84
End: 2024-05-07
Payer: MEDICARE

## 2024-05-20 NOTE — PROGRESS NOTES
[facility]  Cardiology progress Note    Patient Name: Rudy Moore  : 1940    CHIEF COMPLAINT  CAD        Subjective   Subjective     HISTORY OF PRESENT ILLNESS    Rudy Moore is a 83 y.o. male with CAD s/p PTCA/stent.  He has some dizziness off-and-on.  Feeling dizzy today.  Also continues to have some nonspecific chest pain.    REVIEW OF SYSTEMS    Constitutional:    No fever, no weight loss  Skin:     No rash  Otolaryngeal:    No difficulty swallowing  Cardiovascular: See HPI.  Pulmonary:    No cough, no sputum production    Personal History     Social History:    reports that he quit smoking about 23 years ago. His smoking use included cigarettes. He started smoking about 65 years ago. He has a 84 pack-year smoking history. He has never used smokeless tobacco. He reports current alcohol use. He reports that he does not use drugs.    Home Medications:  Current Outpatient Medications on File Prior to Visit   Medication Sig    aspirin 81 MG EC tablet Take 1 tablet by mouth Daily.    atorvastatin (LIPITOR) 20 MG tablet Take 1 tablet by mouth Every Night.    cilostazol (PLETAL) 100 MG tablet Take 1 tablet by mouth 2 (Two) Times a Day.    glucose blood (True Metrix Blood Glucose Test) test strip 1 each by Other route Daily. Use as instructed    isosorbide mononitrate (IMDUR) 30 MG 24 hr tablet TAKE 1 TABLET BY MOUTH DAILY    lisinopril (PRINIVIL,ZESTRIL) 40 MG tablet Take 1 tablet by mouth Daily.    metoprolol tartrate (LOPRESSOR) 50 MG tablet Take 1 tablet by mouth Daily.    multivitamin with minerals tablet tablet Take 1 tablet by mouth Daily.    traMADol (ULTRAM) 50 MG tablet TAKE 2 TABLETS BY MOUTH TWICE DAILY AS NEEDED FOR MODERATE PAIN     No current facility-administered medications on file prior to visit.       Past Medical History:   Diagnosis Date    CAD S/P percutaneous coronary angioplasty 2022    Cataract     Diabetes mellitus, type 2     Essential hypertension  07/23/2020    GERD (gastroesophageal reflux disease)     History of coronary angioplasty with insertion of stent     Hyperlipidemia     Kidney stones     Peripheral arterial disease 07/23/2020    Seasonal allergies        Allergies:  No Known Allergies    Objective    Objective       Vitals:   Heart Rate:  [62-68] 68  BP: ()/(40-88) 106/88  Body mass index is 29.98 kg/m².     PHYSICAL EXAM:    General Appearance:   well developed  well nourished  HENT:   oropharynx moist  lips not cyanotic  Neck:  thyroid not enlarged  supple  Respiratory:  no respiratory distress  normal breath sounds  no rales  Cardiovascular:  no jugular venous distention  regular rhythm  apical impulse normal  S1 normal, S2 normal  no S3, no S4   SM GR 3/6 AA  no rub, no thrill  carotid pulses normal; no bruit  pedal pulses normal  lower extremity edema: none    Skin:   warm, dry  Psychiatric:  judgement and insight appropriate  normal mood and affect        Result Review:  I have personally reviewed the available results from  [x]  Laboratory  [x]  EKG  [x]  Cardiology  [x]  Medications  [x]  Old records  []  Other:       ECG 12 Lead    Date/Time: 5/23/2024 11:57 AM  Performed by: Fabiano Randolph MD    Authorized by: Fabiano Randolph MD  Comparison: compared with previous ECG   Rhythm: sinus rhythm  Rate: normal  QRS axis: normal    Clinical impression: normal ECG  Comments: Normal sinus rhythm.  Inferior wall MI age-indeterminate.  Nonspecific ST-T changes from previous EKG.        Lab Results   Component Value Date    CHOL 198 02/26/2024    CHOL 181 08/29/2023    CHOL 191 02/09/2023     Lab Results   Component Value Date    TRIG 185 (H) 02/26/2024    TRIG 111 08/29/2023    TRIG 101 02/09/2023     Lab Results   Component Value Date    HDL 80 (H) 02/26/2024    HDL 76 (H) 08/29/2023    HDL 83 (H) 02/09/2023     Lab Results   Component Value Date    LDL 87 02/26/2024    LDL 86 08/29/2023    LDL 90 02/09/2023     Lab Results    Component Value Date    VLDL 31 02/26/2024    VLDL 19 08/29/2023    VLDL 18 02/09/2023     Results for orders placed during the hospital encounter of 05/06/24    Adult Transthoracic Echo Complete W/ Cont if Necessary Per Protocol    Interpretation Summary  Normal left ventricular systolic function.  Fibrocalcific aortic valve.  Moderate to moderately severe aortic stenosis with a mean gradient of 24 mm.  No change from previous echo.  Trace mitral regurgitation.     Impression/Plan:  1. Coronary disease s/p PTCA/stent atypical chest pain: Sestamibi stress test showed fixed defect.  Continue aspirin 81 mg once a day.  Continue metoprolol 50 mg once a day.  Will need cardiac catheterization.  In view of his chest pain and dizziness will refer him to the emergency room for evaluation.  2.  Essential hypertension controlled: .  Continue metoprolol 50 mg once a day.  Decrease lisinopril to 20 mg once a day  3.  Hyperlipidemia: Continue Lipitor 20 mg once a day.  Monitor lipid and hepatic profile.  4.  Moderately severe aortic stenosis: No significant change from previous echo.              Fabiano Randolph MD   05/23/24   11:26 EDT

## 2024-05-23 ENCOUNTER — APPOINTMENT (OUTPATIENT)
Dept: GENERAL RADIOLOGY | Facility: HOSPITAL | Age: 84
DRG: 281 | End: 2024-05-23
Payer: MEDICARE

## 2024-05-23 ENCOUNTER — OFFICE VISIT (OUTPATIENT)
Dept: CARDIOLOGY | Facility: CLINIC | Age: 84
End: 2024-05-23
Payer: MEDICARE

## 2024-05-23 ENCOUNTER — HOSPITAL ENCOUNTER (INPATIENT)
Facility: HOSPITAL | Age: 84
LOS: 2 days | Discharge: HOME | DRG: 281 | End: 2024-05-25
Attending: EMERGENCY MEDICINE | Admitting: INTERNAL MEDICINE
Payer: MEDICARE

## 2024-05-23 VITALS
WEIGHT: 203 LBS | DIASTOLIC BLOOD PRESSURE: 88 MMHG | SYSTOLIC BLOOD PRESSURE: 106 MMHG | HEART RATE: 68 BPM | HEIGHT: 69 IN | BODY MASS INDEX: 30.07 KG/M2

## 2024-05-23 DIAGNOSIS — I21.4 NSTEMI (NON-ST ELEVATED MYOCARDIAL INFARCTION): Primary | ICD-10-CM

## 2024-05-23 DIAGNOSIS — E78.2 HYPERLIPEMIA, MIXED: ICD-10-CM

## 2024-05-23 DIAGNOSIS — Z95.5 HISTORY OF CORONARY ANGIOPLASTY WITH INSERTION OF STENT: ICD-10-CM

## 2024-05-23 DIAGNOSIS — I25.10 CORONARY ARTERY DISEASE INVOLVING NATIVE CORONARY ARTERY OF NATIVE HEART WITHOUT ANGINA PECTORIS: Primary | ICD-10-CM

## 2024-05-23 DIAGNOSIS — I10 HYPERTENSION, ESSENTIAL: ICD-10-CM

## 2024-05-23 LAB
ALBUMIN SERPL-MCNC: 3.8 G/DL (ref 3.5–5.2)
ALBUMIN/GLOB SERPL: 1.5 G/DL
ALP SERPL-CCNC: 76 U/L (ref 39–117)
ALT SERPL W P-5'-P-CCNC: 20 U/L (ref 1–41)
ANION GAP SERPL CALCULATED.3IONS-SCNC: 11.6 MMOL/L (ref 5–15)
APTT PPP: 124.4 SECONDS (ref 78–95.9)
APTT PPP: 23.8 SECONDS (ref 78–95.9)
AST SERPL-CCNC: 29 U/L (ref 1–40)
BASOPHILS # BLD AUTO: 0.03 10*3/MM3 (ref 0–0.2)
BASOPHILS NFR BLD AUTO: 0.3 % (ref 0–1.5)
BILIRUB SERPL-MCNC: 0.9 MG/DL (ref 0–1.2)
BUN SERPL-MCNC: 31 MG/DL (ref 8–23)
BUN/CREAT SERPL: 14.3 (ref 7–25)
CALCIUM SPEC-SCNC: 9.3 MG/DL (ref 8.6–10.5)
CHLORIDE SERPL-SCNC: 101 MMOL/L (ref 98–107)
CK MB SERPL-CCNC: 34.78 NG/ML
CK SERPL-CCNC: 284 U/L (ref 20–200)
CO2 SERPL-SCNC: 27.4 MMOL/L (ref 22–29)
CREAT SERPL-MCNC: 2.17 MG/DL (ref 0.76–1.27)
DEPRECATED RDW RBC AUTO: 45 FL (ref 37–54)
EGFRCR SERPLBLD CKD-EPI 2021: 29.7 ML/MIN/1.73
EOSINOPHIL # BLD AUTO: 0.02 10*3/MM3 (ref 0–0.4)
EOSINOPHIL NFR BLD AUTO: 0.2 % (ref 0.3–6.2)
ERYTHROCYTE [DISTWIDTH] IN BLOOD BY AUTOMATED COUNT: 12 % (ref 12.3–15.4)
GEN 5 2HR TROPONIN T REFLEX: 306 NG/L
GLOBULIN UR ELPH-MCNC: 2.6 GM/DL
GLUCOSE SERPL-MCNC: 216 MG/DL (ref 65–99)
HCT VFR BLD AUTO: 38.9 % (ref 37.5–51)
HGB BLD-MCNC: 13.1 G/DL (ref 13–17.7)
HOLD SPECIMEN: NORMAL
HOLD SPECIMEN: NORMAL
IMM GRANULOCYTES # BLD AUTO: 0.03 10*3/MM3 (ref 0–0.05)
IMM GRANULOCYTES NFR BLD AUTO: 0.3 % (ref 0–0.5)
INR PPP: 1.06 (ref 0.86–1.15)
LIPASE SERPL-CCNC: 30 U/L (ref 13–60)
LYMPHOCYTES # BLD AUTO: 1.12 10*3/MM3 (ref 0.7–3.1)
LYMPHOCYTES NFR BLD AUTO: 12.4 % (ref 19.6–45.3)
MAGNESIUM SERPL-MCNC: 1.9 MG/DL (ref 1.6–2.4)
MCH RBC QN AUTO: 34.1 PG (ref 26.6–33)
MCHC RBC AUTO-ENTMCNC: 33.7 G/DL (ref 31.5–35.7)
MCV RBC AUTO: 101.3 FL (ref 79–97)
MONOCYTES # BLD AUTO: 0.57 10*3/MM3 (ref 0.1–0.9)
MONOCYTES NFR BLD AUTO: 6.3 % (ref 5–12)
NEUTROPHILS NFR BLD AUTO: 7.26 10*3/MM3 (ref 1.7–7)
NEUTROPHILS NFR BLD AUTO: 80.5 % (ref 42.7–76)
NRBC BLD AUTO-RTO: 0 /100 WBC (ref 0–0.2)
NT-PROBNP SERPL-MCNC: 1088 PG/ML (ref 0–1800)
PLATELET # BLD AUTO: 219 10*3/MM3 (ref 140–450)
PMV BLD AUTO: 9.4 FL (ref 6–12)
POTASSIUM SERPL-SCNC: 4.7 MMOL/L (ref 3.5–5.2)
PROT SERPL-MCNC: 6.4 G/DL (ref 6–8.5)
PROTHROMBIN TIME: 14 SECONDS (ref 11.8–14.9)
QT INTERVAL: 397 MS
QT INTERVAL: 401 MS
QTC INTERVAL: 421 MS
QTC INTERVAL: 426 MS
RBC # BLD AUTO: 3.84 10*6/MM3 (ref 4.14–5.8)
SODIUM SERPL-SCNC: 140 MMOL/L (ref 136–145)
TROPONIN T DELTA: 106 NG/L
TROPONIN T SERPL HS-MCNC: 200 NG/L
WBC NRBC COR # BLD AUTO: 9.03 10*3/MM3 (ref 3.4–10.8)
WHOLE BLOOD HOLD COAG: NORMAL
WHOLE BLOOD HOLD SPECIMEN: NORMAL

## 2024-05-23 PROCEDURE — 85025 COMPLETE CBC W/AUTO DIFF WBC: CPT

## 2024-05-23 PROCEDURE — 85610 PROTHROMBIN TIME: CPT | Performed by: EMERGENCY MEDICINE

## 2024-05-23 PROCEDURE — 93005 ELECTROCARDIOGRAM TRACING: CPT | Performed by: EMERGENCY MEDICINE

## 2024-05-23 PROCEDURE — 83880 ASSAY OF NATRIURETIC PEPTIDE: CPT | Performed by: EMERGENCY MEDICINE

## 2024-05-23 PROCEDURE — 99291 CRITICAL CARE FIRST HOUR: CPT

## 2024-05-23 PROCEDURE — 85730 THROMBOPLASTIN TIME PARTIAL: CPT | Performed by: EMERGENCY MEDICINE

## 2024-05-23 PROCEDURE — 84484 ASSAY OF TROPONIN QUANT: CPT | Performed by: EMERGENCY MEDICINE

## 2024-05-23 PROCEDURE — 3074F SYST BP LT 130 MM HG: CPT | Performed by: SPECIALIST

## 2024-05-23 PROCEDURE — 85730 THROMBOPLASTIN TIME PARTIAL: CPT | Performed by: INTERNAL MEDICINE

## 2024-05-23 PROCEDURE — 82550 ASSAY OF CK (CPK): CPT | Performed by: INTERNAL MEDICINE

## 2024-05-23 PROCEDURE — 36415 COLL VENOUS BLD VENIPUNCTURE: CPT

## 2024-05-23 PROCEDURE — 82553 CREATINE MB FRACTION: CPT | Performed by: INTERNAL MEDICINE

## 2024-05-23 PROCEDURE — 99214 OFFICE O/P EST MOD 30 MIN: CPT | Performed by: SPECIALIST

## 2024-05-23 PROCEDURE — 3078F DIAST BP <80 MM HG: CPT | Performed by: SPECIALIST

## 2024-05-23 PROCEDURE — 25010000002 HEPARIN (PORCINE) 25000-0.45 UT/250ML-% SOLUTION: Performed by: EMERGENCY MEDICINE

## 2024-05-23 PROCEDURE — 80053 COMPREHEN METABOLIC PANEL: CPT | Performed by: EMERGENCY MEDICINE

## 2024-05-23 PROCEDURE — 93005 ELECTROCARDIOGRAM TRACING: CPT

## 2024-05-23 PROCEDURE — 25810000003 SODIUM CHLORIDE 0.9 % SOLUTION: Performed by: INTERNAL MEDICINE

## 2024-05-23 PROCEDURE — 99223 1ST HOSP IP/OBS HIGH 75: CPT | Performed by: INTERNAL MEDICINE

## 2024-05-23 PROCEDURE — 83735 ASSAY OF MAGNESIUM: CPT | Performed by: EMERGENCY MEDICINE

## 2024-05-23 PROCEDURE — 93000 ELECTROCARDIOGRAM COMPLETE: CPT | Performed by: SPECIALIST

## 2024-05-23 PROCEDURE — 83690 ASSAY OF LIPASE: CPT | Performed by: EMERGENCY MEDICINE

## 2024-05-23 PROCEDURE — 71045 X-RAY EXAM CHEST 1 VIEW: CPT

## 2024-05-23 RX ORDER — ISOSORBIDE MONONITRATE 30 MG/1
30 TABLET, EXTENDED RELEASE ORAL DAILY
Status: DISCONTINUED | OUTPATIENT
Start: 2024-05-23 | End: 2024-05-23

## 2024-05-23 RX ORDER — SODIUM CHLORIDE 9 MG/ML
75 INJECTION, SOLUTION INTRAVENOUS CONTINUOUS
Status: ACTIVE | OUTPATIENT
Start: 2024-05-23 | End: 2024-05-24

## 2024-05-23 RX ORDER — SODIUM CHLORIDE 0.9 % (FLUSH) 0.9 %
10 SYRINGE (ML) INJECTION AS NEEDED
Status: DISCONTINUED | OUTPATIENT
Start: 2024-05-23 | End: 2024-05-24

## 2024-05-23 RX ORDER — SODIUM CHLORIDE 9 MG/ML
75 INJECTION, SOLUTION INTRAVENOUS CONTINUOUS
Status: DISCONTINUED | OUTPATIENT
Start: 2024-05-23 | End: 2024-05-23

## 2024-05-23 RX ORDER — CILOSTAZOL 100 MG/1
100 TABLET ORAL 2 TIMES DAILY
Status: DISCONTINUED | OUTPATIENT
Start: 2024-05-23 | End: 2024-05-25 | Stop reason: HOSPADM

## 2024-05-23 RX ORDER — NALOXONE HCL 0.4 MG/ML
0.4 VIAL (ML) INJECTION
Status: DISCONTINUED | OUTPATIENT
Start: 2024-05-23 | End: 2024-05-25 | Stop reason: HOSPADM

## 2024-05-23 RX ORDER — NITROGLYCERIN 40 MG/1
1 PATCH TRANSDERMAL DAILY
Status: DISCONTINUED | OUTPATIENT
Start: 2024-05-23 | End: 2024-05-25 | Stop reason: HOSPADM

## 2024-05-23 RX ORDER — SODIUM CHLORIDE 9 MG/ML
40 INJECTION, SOLUTION INTRAVENOUS AS NEEDED
Status: DISCONTINUED | OUTPATIENT
Start: 2024-05-23 | End: 2024-05-25 | Stop reason: HOSPADM

## 2024-05-23 RX ORDER — HEPARIN SODIUM 10000 [USP'U]/100ML
12 INJECTION, SOLUTION INTRAVENOUS
Status: DISCONTINUED | OUTPATIENT
Start: 2024-05-23 | End: 2024-05-24

## 2024-05-23 RX ORDER — MORPHINE SULFATE 2 MG/ML
2 INJECTION, SOLUTION INTRAMUSCULAR; INTRAVENOUS EVERY 4 HOURS PRN
Status: DISCONTINUED | OUTPATIENT
Start: 2024-05-23 | End: 2024-05-25 | Stop reason: HOSPADM

## 2024-05-23 RX ORDER — ATORVASTATIN CALCIUM 20 MG/1
20 TABLET, FILM COATED ORAL NIGHTLY
Status: DISCONTINUED | OUTPATIENT
Start: 2024-05-23 | End: 2024-05-25 | Stop reason: HOSPADM

## 2024-05-23 RX ORDER — ACETAMINOPHEN 325 MG/1
650 TABLET ORAL EVERY 4 HOURS PRN
Status: DISCONTINUED | OUTPATIENT
Start: 2024-05-23 | End: 2024-05-25 | Stop reason: HOSPADM

## 2024-05-23 RX ORDER — ASPIRIN 81 MG/1
81 TABLET ORAL DAILY
Status: DISCONTINUED | OUTPATIENT
Start: 2024-05-24 | End: 2024-05-25 | Stop reason: HOSPADM

## 2024-05-23 RX ORDER — SODIUM CHLORIDE 0.9 % (FLUSH) 0.9 %
10 SYRINGE (ML) INJECTION AS NEEDED
Status: DISCONTINUED | OUTPATIENT
Start: 2024-05-23 | End: 2024-05-25 | Stop reason: HOSPADM

## 2024-05-23 RX ORDER — ASPIRIN 81 MG/1
324 TABLET, CHEWABLE ORAL ONCE
Status: DISCONTINUED | OUTPATIENT
Start: 2024-05-23 | End: 2024-05-24

## 2024-05-23 RX ORDER — SODIUM CHLORIDE 0.9 % (FLUSH) 0.9 %
10 SYRINGE (ML) INJECTION EVERY 12 HOURS SCHEDULED
Status: DISCONTINUED | OUTPATIENT
Start: 2024-05-23 | End: 2024-05-25 | Stop reason: HOSPADM

## 2024-05-23 RX ADMIN — Medication 10 ML: at 20:08

## 2024-05-23 RX ADMIN — ATORVASTATIN CALCIUM 20 MG: 20 TABLET, FILM COATED ORAL at 20:06

## 2024-05-23 RX ADMIN — HEPARIN SODIUM 12 UNITS/KG/HR: 10000 INJECTION, SOLUTION INTRAVENOUS at 15:44

## 2024-05-23 RX ADMIN — NITROGLYCERIN 1 PATCH: 0.2 PATCH TRANSDERMAL at 20:06

## 2024-05-23 RX ADMIN — SODIUM CHLORIDE 75 ML/HR: 9 INJECTION, SOLUTION INTRAVENOUS at 20:13

## 2024-05-23 RX ADMIN — METOPROLOL TARTRATE 25 MG: 25 TABLET, FILM COATED ORAL at 20:06

## 2024-05-23 RX ADMIN — CILOSTAZOL 100 MG: 100 TABLET ORAL at 20:07

## 2024-05-23 NOTE — H&P
[facility]  Date: 2024   Patient Name: Rudy Moore  : 1940  MRN: [id number]  Primary Care Physician:  Kim Dailey, RUSS  Date of admission: 2024    Subjective   Subjective     Chief Complaint: Chest pain    HPI:    Rudy Moore is a 83 y.o. male past medical history of CKD with baseline creatinine in need of 1.5-1.7, coronary disease, type 2 diabetes not on any medication, hypertension, peripheral vascular disease, and aortic stenosis who presents the chest pain    Patient's states that he has had some chest pain ongoing for a month.  He had a couple pretty significant episodes.  However he got up to go to the bathroom this morning at 4 AM when he was walking towards the bathroom started developing significant chest pain.  He had a doctor for this cardiology today who told him to come to the emergency department.  He also had some nausea and, diaphoresis and, some presyncopal symptoms.  As result he came to the ER for further evaluation.    In the emergency department his blood pressure is 97.6,, pulse of 68, respiratory rate of 18, blood pressure 108/62, 94% on room air.  CBC shows no concerning abnormalities except for an MCV of 101.  CMP shows a bicarb of 27.4 and a creatinine of 2.17 with a baseline creatinine with baseline creatinine 1.5-1.7.  Initial troponin was 200 repeat troponin is 306 with a CK-MB of 34.78.  Cardiology was consulted.  The patient was heparinized.  The patient admitted the hospital for continued management of NSTEMI.    All systems reviewed abnormalities noted above    Personal History     Past Medical History:  CKD stage IIIb with baseline creatinine 1.5-1.7  Coronary artery disease  Type 2 diabetes  Hypertension   peripheral vascular disease  Carotid artery  Moderate to moderately severe aortic stenosis with a mean gradient of 24mm    Past Surgical History:  CABG  Carotid endarterectomy  Femoral  endarterectomy  Iliac artery stent    Family History:   Alcohol abuse  Arthritis  Breast cancer    Social History:   Former.  Drinks several drinks a day    Home Medications:  aspirin, atorvastatin, cilostazol, glucose blood, isosorbide mononitrate, lisinopril, metoprolol tartrate, multivitamin with minerals, and traMADol    Allergies:  No Known Allergies      Objective   Objective     Vitals:   Temp:  [97.6 °F (36.4 °C)] 97.6 °F (36.4 °C)  Heart Rate:  [62-71] 68  Resp:  [18] 18  BP: ()/(40-88) 108/62    Physical Exam    Constitutional: Awake, alert, no acute distress   Eyes: Pupils equal, sclerae anicteric, no conjunctival injection   HENT: NCAT, mucous membranes moist   Neck: Supple, no thyromegaly, no lymphadenopathy, trachea midline   Respiratory: Clear to auscultation bilaterally, nonlabored respirations    Cardiovascular: RRR, 3 out of 6 systolic ejection murmur at the right upper sternal border, rubs, or gallops, palpable pedal pulses bilaterally   Gastrointestinal: Positive bowel sounds, soft, nontender, nondistended   Musculoskeletal: No bilateral ankle edema, no clubbing or cyanosis to extremities   Psychiatric: Appropriate affect, cooperative   Neurologic: Oriented x 3, strength symmetric in all extremities, Cranial Nerves grossly intact to confrontation, speech clear   Skin: No rashes     Result Review    Result Review:  I have personally reviewed the results from the time of this admission to 5/23/2024 15:36 EDT and agree with these findings:  Troponin of 306  CK-MB of 34    Assessment & Plan   Assessment / Plan     Assessment/Plan:   NSTEMI  Acute kidney injury with baseline creatinine 1.5-1.8 currently 2.17  Moderate to moderately severe aortic stenosis  Hypertension  Peripheral vascular disease    Plan:  --Admit to hospital service  -- Consult cardiology  -- Trend troponins  -- Continue heparin drip  -- Patient also has mild acute kidney injury so we will give a very mild fluid resuscitation  with 50 cc of normal saline over 10 hours.  EF is normal but does have moderately severe aortic stenosis.  -- Continue metoprolol  -- Hold lisinopril due to acute kidney injury  -- Continue aspirin    DVT prophylaxis:  Heparin drip        CODE STATUS:     Full code    Admission Status:  I believe this patient meets admission status.    Electronically signed by Bebeto Hamilton MD, 05/23/24, 3:36 PM EDT.

## 2024-05-23 NOTE — ED PROVIDER NOTES
Time: 3:36 PM EDT  Date of encounter:  5/23/2024  Independent Historian/Clinical History and Information was obtained by:   Patient  Chief Complaint: Chest pain    History is limited by: N/A    History of Present Illness:  Patient is a 83 y.o. year old male who presents to the emergency department for evaluation of chest pain.  Patient presents to the ER from his cardiologist office for evaluation of chest pain.  Patient reports he started having chest pain around 4 AM this morning.  Patient states it woke him up from sleep.  Patient reports its midsternal in nature.  Patient denies any radiation of the pain.  Patient describes as an aching kind of pain.  Patient also states he feels little bit dizzy.  Patient dates the pain was constant until his arrival to the ER.  Patient already has an appointment at 11 AM today with his cardiologist, Dr. Randolph.  Dr. Randolph sent him to the ER from his office.  Patient has a history of coronary artery disease, carotid artery disease, and peripheral artery disease with multiple stents.    HPI    Patient Care Team  Primary Care Provider: Kim Dailey APRN    Past Medical History:     No Known Allergies  Past Medical History:   Diagnosis Date    CAD S/P percutaneous coronary angioplasty 05/05/2022    Cataract     Diabetes mellitus, type 2     Essential hypertension 07/23/2020    GERD (gastroesophageal reflux disease)     History of coronary angioplasty with insertion of stent     Hyperlipidemia     Kidney stones     Peripheral arterial disease 07/23/2020    Seasonal allergies      Past Surgical History:   Procedure Laterality Date    CARDIAC SURGERY      BYPASS    CAROTID ENDARTERECTOMY Right 08/2020    FEMORAL ENDARTERECTOMY Bilateral 10/2020    ILIAC ARTERY STENT Bilateral 10/2020     Family History   Problem Relation Age of Onset    Arthritis Mother     Alcohol abuse Father     Asthma Sister     COPD Sister     Breast cancer Sister     Diabetes type I Brother      Stroke Brother        Home Medications:  Prior to Admission medications    Medication Sig Start Date End Date Taking? Authorizing Provider   aspirin 81 MG EC tablet Take 1 tablet by mouth Daily.   Yes Provider, MD Madalyn   atorvastatin (LIPITOR) 20 MG tablet Take 1 tablet by mouth Every Night. 24  Yes Kim Dailey APRN   cilostazol (PLETAL) 100 MG tablet Take 1 tablet by mouth 2 (Two) Times a Day. 24  Yes Kim Dailey APRN   isosorbide mononitrate (IMDUR) 30 MG 24 hr tablet TAKE 1 TABLET BY MOUTH DAILY 24  Yes Mallory Duong APRN   lisinopril (PRINIVIL,ZESTRIL) 40 MG tablet Take 1 tablet by mouth Daily. 24  Yes Kim Dailey APRN   metoprolol tartrate (LOPRESSOR) 50 MG tablet Take 1 tablet by mouth Daily. 24  Yes Kim Dailey APRN   multivitamin with minerals tablet tablet Take 1 tablet by mouth Daily.   Yes Provider, MD Madalyn   traMADol (ULTRAM) 50 MG tablet TAKE 2 TABLETS BY MOUTH TWICE DAILY AS NEEDED FOR MODERATE PAIN 24  Yes Kim Dailey APRN   glucose blood (True Metrix Blood Glucose Test) test strip 1 each by Other route Daily. Use as instructed 23   Kim Dailey APRN        Social History:   Social History     Tobacco Use    Smoking status: Former     Current packs/day: 0.00     Average packs/day: 2.0 packs/day for 42.0 years (84.0 ttl pk-yrs)     Types: Cigarettes     Start date:      Quit date:      Years since quittin.4    Smokeless tobacco: Never    Tobacco comments:     SMOKES 1 TO 2 PACKS PER DAY, CURRENTLY USES OTHER TOBACCO PRODUCTS    Vaping Use    Vaping status: Never Used   Substance Use Topics    Alcohol use: Yes     Comment: DRINKS ALCHOL, 8-14 DRINKS PER WEEK     Drug use: Never         Review of Systems:  Review of Systems   Constitutional:  Negative for chills and fever.   HENT:  Negative for congestion, ear pain and sore throat.    Eyes:  Negative for pain.   Respiratory:   "Negative for cough, chest tightness and shortness of breath.    Cardiovascular:  Positive for chest pain.   Gastrointestinal:  Negative for abdominal pain, diarrhea, nausea and vomiting.   Genitourinary:  Negative for flank pain and hematuria.   Musculoskeletal:  Negative for joint swelling.   Skin:  Negative for pallor.   Neurological:  Positive for dizziness. Negative for seizures and headaches.   All other systems reviewed and are negative.       Physical Exam:  /62   Pulse 68   Temp 97.6 °F (36.4 °C) (Oral)   Resp 18   Ht 175.3 cm (69\")   Wt 93.3 kg (205 lb 11 oz)   SpO2 94%   BMI 30.38 kg/m²     Physical Exam    Vital signs were reviewed under triage note.  General appearance - Patient appears well-developed and well-nourished.  Patient is in no acute distress.  Head - Normocephalic, atraumatic.  Pupils - Equal, round, reactive to light.  Extraocular muscles are intact.  Conjunctiva is clear.  Nasal - Normal inspection.  No evidence of trauma or epistaxis.  Tympanic membranes - Gray, intact without erythema or retractions.  Oral mucosa - Pink and moist without lesions or erythema.  Uvula is midline.  Chest wall - Atraumatic.  Chest wall is nontender.  There are no vesicular rashes noted.  Neck - Supple.  Trachea was midline.  There is no palpable lymphadenopathy or thyromegaly.  There are no meningeal signs  Lungs - Clear to auscultation and percussion bilaterally.  Heart - Regular rate and rhythm without any murmurs, clicks, or gallops.  Abdomen - Soft.  Bowel sounds are present.  There is no palpable tenderness.  There is no rebound, guarding, or rigidity.  There are no palpable masses.  There are no pulsatile masses.  Back - Spine is straight and midline.  There is no CVA tenderness.  Extremities - Intact x4 with full range of motion.  There is no palpable edema.  Pulses are intact x4 and equal.  Neurologic - Patient is awake, alert, and oriented x3.  Cranial nerves II through XII are grossly " intact.  Motor and sensory functions grossly intact.  Cerebellar function was normal.  Integument - There are no rashes.  There are no petechia or purpura lesions noted.  There are no vesicular lesions noted.           Procedures:  Procedures      Medical Decision Making:      Comorbidities that affect care:    Coronary artery disease, carotid artery disease, peripheral artery disease, diabetes, hypertension, GERD, hyperlipidemia, kidney stones    External Notes reviewed:    Previous Clinic Note: Office visit with Dr. Randolph from earlier this morning was reviewed by me.      The following orders were placed and all results were independently analyzed by me:  Orders Placed This Encounter   Procedures    XR Chest 1 View    [place] Draw    High Sensitivity Troponin T    Comprehensive Metabolic Panel    Lipase    BNP    Magnesium    CBC Auto Differential    High Sensitivity Troponin T 2Hr    CK    CK-MB    Protime-INR    aPTT    aPTT    NPO Diet NPO Type: Strict NPO    Undress & Gown    Continuous Pulse Oximetry    Notify Provider Platelet Count Less Than 98745    Notify Provider if PTT Not in Therapeutic Range After 24 Hours    Stop Infusion & Notify Provider if Bleeding Occurs    RN To Release aPTT Order 6 Hours After Heparin Bolus & 6 Hours After Any Heparin Rate Change    After 2 Consecutive Therapeutic aPTTs, Obtain aPTT Daily.  If a Rate Adjustment is Necessary, Resume Every 6 Hour aPTT Draws    Cardiology (on-call MD unless specified)    Hospitalist (on-call MD unless specified)    Oxygen Therapy- Nasal Cannula; Titrate 1-6 LPM Per SpO2; 90 - 95%    ECG 12 Lead ED Triage Standing Order; Chest Pain    ECG 12 Lead ED Triage Standing Order; Chest Pain    Insert Peripheral IV    CBC & Differential    Green Top (Gel)    Lavender Top    Gold Top - SST    Light Blue Top    CBC & Differential       Medications Given in the Emergency Department:  Medications   sodium chloride 0.9 % flush 10 mL (has no administration in  time range)   aspirin chewable tablet 324 mg (324 mg Oral Not Given 5/23/24 1257)   heparin bolus from bag solution 5,000 Units (has no administration in time range)   heparin 41964 units/250 mL (100 units/mL) in 0.45 % NaCl infusion (has no administration in time range)   heparin bolus from bag solution 4,000 Units (has no administration in time range)   heparin bolus from bag solution 2,000 Units (has no administration in time range)        ED Course:    ED Course as of 05/23/24 1537   Thu May 23, 2024   1536 EKG performed at 1211 was interpreted by me to show a normal sinus rhythm with a ventricular rate of 68 bpm.  The AK interval is 155 ms.  P waves are normal.  QRS interval is normal.  Axis was at 15 degrees.  There is no acute ischemic ST or T wave change identified.  QT corrected was 426 ms. [TB]   1537 EKG performed at 1418 was interpreted by me to show a normal sinus rhythm with a ventricular of 67 bpm.  The AK interval is 156 ms.  P waves are normal.  QRS interval is normal.  Axis was at 4 degrees.  There is no acute ischemic ST or T wave change identified.  QT corrected was 421 ms. [TB]      ED Course User Index  [TB] John Etienne DO       The patient was seen and evaluated in the ED by me.  The above history and physical examination was performed as documented.  Diagnostic data was obtained.  Results reviewed.  Patient has had an apparent NSTEMI.  Patient was started on IV heparin.  Dr. Ryan was consulted.  Plans are to perform a heart cath tomorrow unless patient has change in his symptoms or recurrent chest pain.  Patient is pain-free at time of admission.  Hospital service was consulted for admission.    Labs:    Lab Results (last 24 hours)       Procedure Component Value Units Date/Time    High Sensitivity Troponin T [[id number]]  (Abnormal) Collected: 05/23/24 1228    Specimen: Blood Updated: 05/23/24 1313     HS Troponin T 200 ng/L     Narrative:      High Sensitive Troponin T Reference  Range:  <14.0 ng/L- Negative Female for AMI  <22.0 ng/L- Negative Male for AMI  >=14 - Abnormal Female indicating possible myocardial injury.  >=22 - Abnormal Male indicating possible myocardial injury.   Clinicians would have to utilize clinical acumen, EKG, Troponin, and serial changes to determine if it is an Acute Myocardial Infarction or myocardial injury due to an underlying chronic condition.         CBC & Differential [[id number]]  (Abnormal) Collected: 05/23/24 1228    Specimen: Blood Updated: 05/23/24 1234    Narrative:      The following orders were created for panel order CBC & Differential.  Procedure                               Abnormality         Status                     ---------                               -----------         ------                     CBC Auto Differential[[id number]]        Abnormal            Final result                 Please view results for these tests on the individual orders.    Comprehensive Metabolic Panel [[id number]]  (Abnormal) Collected: 05/23/24 1228    Specimen: Blood Updated: 05/23/24 1255     Glucose 216 mg/dL      BUN 31 mg/dL      Creatinine 2.17 mg/dL      Sodium 140 mmol/L      Potassium 4.7 mmol/L      Chloride 101 mmol/L      CO2 27.4 mmol/L      Calcium 9.3 mg/dL      Total Protein 6.4 g/dL      Albumin 3.8 g/dL      ALT (SGPT) 20 U/L      AST (SGOT) 29 U/L      Alkaline Phosphatase 76 U/L      Total Bilirubin 0.9 mg/dL      Globulin 2.6 gm/dL      A/G Ratio 1.5 g/dL      BUN/Creatinine Ratio 14.3     Anion Gap 11.6 mmol/L      eGFR 29.7 mL/min/1.73     Narrative:      GFR Normal >60  Chronic Kidney Disease <60  Kidney Failure <15    The GFR formula is only valid for adults with stable renal function between ages 18 and 70.    Lipase [[id number]]  (Normal) Collected: 05/23/24 1228    Specimen: Blood Updated: 05/23/24 1255     Lipase 30 U/L     BNP [[id number]]  (Normal) Collected: 05/23/24 1228    Specimen: Blood Updated: 05/23/24 1253     proBNP 1,088.0  pg/mL     Narrative:      This assay is used as an aid in the diagnosis of individuals suspected of having heart failure. It can be used as an aid in the diagnosis of acute decompensated heart failure (ADHF) in patients presenting with signs and symptoms of ADHF to the emergency department (ED). In addition, NT-proBNP of <300 pg/mL indicates ADHF is not likely.    Age Range Result Interpretation  NT-proBNP Concentration (pg/mL:      <50             Positive            >450                   Gray                 300-450                    Negative             <300    50-75           Positive            >900                  Gray                300-900                  Negative            <300      >75             Positive            >1800                  Gray                300-1800                  Negative            <300    Magnesium [[id number]]  (Normal) Collected: 05/23/24 1228    Specimen: Blood Updated: 05/23/24 1255     Magnesium 1.9 mg/dL     CBC Auto Differential [[id number]]  (Abnormal) Collected: 05/23/24 1228    Specimen: Blood Updated: 05/23/24 1234     WBC 9.03 10*3/mm3      RBC 3.84 10*6/mm3      Hemoglobin 13.1 g/dL      Hematocrit 38.9 %      .3 fL      MCH 34.1 pg      MCHC 33.7 g/dL      RDW 12.0 %      RDW-SD 45.0 fl      MPV 9.4 fL      Platelets 219 10*3/mm3      Neutrophil % 80.5 %      Lymphocyte % 12.4 %      Monocyte % 6.3 %      Eosinophil % 0.2 %      Basophil % 0.3 %      Immature Grans % 0.3 %      Neutrophils, Absolute 7.26 10*3/mm3      Lymphocytes, Absolute 1.12 10*3/mm3      Monocytes, Absolute 0.57 10*3/mm3      Eosinophils, Absolute 0.02 10*3/mm3      Basophils, Absolute 0.03 10*3/mm3      Immature Grans, Absolute 0.03 10*3/mm3      nRBC 0.0 /100 WBC     Protime-INR [[id number]]  (Normal) Collected: 05/23/24 1228    Specimen: Blood Updated: 05/23/24 1535     Protime 14.0 Seconds      INR 1.06    Narrative:      Suggested Therapeutic Ranges For Oral Anticoagulant  Therapy:  Level of Therapy                      INR Target Range  Standard Dose                            2.0-3.0  High Dose                                2.5-3.5  Patients not receiving anticoagulant  Therapy Normal Range                     0.86-1.15    aPTT [[id number]]  (Abnormal) Collected: 05/23/24 1228    Specimen: Blood Updated: 05/23/24 1535     PTT 23.8 seconds     High Sensitivity Troponin T 2Hr [[id number]]  (Abnormal) Collected: 05/23/24 1432    Specimen: Blood Updated: 05/23/24 1513     HS Troponin T 306 ng/L      Troponin T Delta 106 ng/L     Narrative:      High Sensitive Troponin T Reference Range:  <14.0 ng/L- Negative Female for AMI  <22.0 ng/L- Negative Male for AMI  >=14 - Abnormal Female indicating possible myocardial injury.  >=22 - Abnormal Male indicating possible myocardial injury.   Clinicians would have to utilize clinical acumen, EKG, Troponin, and serial changes to determine if it is an Acute Myocardial Infarction or myocardial injury due to an underlying chronic condition.         CK [[id number]] Collected: 05/23/24 1432    Specimen: Blood Updated: 05/23/24 1517    CK-MB [[id number]] Collected: 05/23/24 1432    Specimen: Blood Updated: 05/23/24 1516             Imaging:    XR Chest 1 View    Result Date: 5/23/2024  XR CHEST 1 VW-  Date of Exam: 5/23/2024 12:34 PM  Indication: Chest Pain Triage Protocol.  Comparison Exams: August 29, 2023  Technique: Single AP chest radiograph  FINDINGS: The lungs are clear. The heart and mediastinal contours appear normal. The pulmonary vasculature appears normal. The osseous structures appear intact.      No acute cardiopulmonary process identified.   Electronically Signed By-Miki Madison MD On:5/23/2024 12:41 PM         Differential Diagnosis and Discussion:    Chest Pain:  Based on the patient's signs and symptoms, I considered aortic dissection, myocardial infaction, pulmonary embolism, cardiac tamponade, pericarditis, pneumothorax,  musculoskeletal chest pain and other differential diagnosis as an etiology of the patient's chest pain.     All labs were reviewed and interpreted by me.  All X-rays impressions were independently interpreted by me.  EKG was interpreted by me.    MDM     Amount and/or Complexity of Data Reviewed  Clinical lab tests: reviewed  Tests in the radiology section of CPT®: reviewed  Tests in the medicine section of CPT®: reviewed         Critical Care Note: Total Critical Care time of 40 minutes. Total critical care time documented does not include time spent on separately billed procedures for services of nurses or physician assistants. I personally saw and examined the patient. I have reviewed all diagnostic interpretations and treatment plans as written. I was present for the key portions of any procedures performed and the inclusive time noted in any critical care statement. Critical care time includes patient management by me, time spent at the patients bedside,  time to review lab and imaging results, discussing patient care, documentation in the medical record, and time spent with family or caregiver.    Patient Care Considerations:          Consultants/Shared Management Plan:    Hospitalist: I have discussed the case with Dr. Hamilton who agrees to accept the patient for admission.  Consultant: I have discussed the case with Dr. Ryan who agrees to consult on the patient.    Social Determinants of Health:    Patient is independent, reliable, and has access to care.       Disposition and Care Coordination:    Admit:   Through independent evaluation of the patient's history, physical, and imperical data, the patient meets criteria for inpatient admission to the hospital.        Final diagnoses:   NSTEMI (non-ST elevated myocardial infarction)        ED Disposition       ED Disposition   Decision to Admit    Condition   --    Comment   --               This medical record created using voice recognition  software.             John Etienne DO  05/25/24 114

## 2024-05-23 NOTE — Clinical Note
The DP pulses are detected w/ doppler bilaterally. The PT pulses are detected w/ doppler bilaterally. The right radial pulse is +2. The right femoral pulse is +1.

## 2024-05-23 NOTE — Clinical Note
A 5 fr sheath was successfully inserted using micropuncture technique with ultrasound guidance into the right femoral artery. Sheath insertion not delayed.

## 2024-05-23 NOTE — Clinical Note
The physician has confirmed that the patient has been reassessed and is appropriate for moderate sedation Consent (Marginal Mandibular)/Introductory Paragraph: The rationale for Mohs was explained to the patient and consent was obtained. The risks, benefits and alternatives to therapy were discussed in detail. Specifically, the risks of damage to the marginal mandibular branch of the facial nerve, infection, scarring, bleeding, prolonged wound healing, incomplete removal, allergy to anesthesia, and recurrence were addressed. Prior to the procedure, the treatment site was clearly identified and confirmed by the patient. All components of Universal Protocol/PAUSE Rule completed.

## 2024-05-23 NOTE — CONSULTS
[facility]   Cardiology Consult Note    Patient Name: Rudy Moore  : 1940  MRN: [id number]  Primary Care Physician:  Kim Dailey APRN  Referring Physician: John Etienne DO    Date of admission: 2024    Subjective   Subjective     Reason for Consultation : Chest pain, elevated troponin    Chief Complaint : Chest pain, nausea, vomiting    HPI:  Rudy Moore is a 83 y.o. male with coronary artery disease, previous angioplasty with stent placement, chronic kidney disease, peripheral artery disease, previous carotid endarterectomy, status post bilateral iliac stenting and moderate aortic stenosis.  He present to the emergency room because of chest pain, nausea and vomiting.  He woke up around 4 AM, and was walking the bathroom developed significant chest pain.  He also had nausea and actually threw up.  Other symptoms include dizziness.  He was seen in cardiology clinic today and was directed to the emergency room.    He was chest pain-free on arrival to the ER.  Labs reveal a troponin of 200 which subsequently trended up.  Creatinine was 2.1 which was above his baseline.  He was started on heparin infusion.  At this time, patient denies any chest pain, shortness of breath or palpitations.  Nausea and vomiting subsided.  He has been n.p.o. since morning.    Review of Systems   All systems were reviewed and negative except for: Chest pain, nausea, vomiting.    Personal History     Past Medical History:   Diagnosis Date    CAD S/P percutaneous coronary angioplasty 2022    Cataract     Diabetes mellitus, type 2     Essential hypertension 2020    GERD (gastroesophageal reflux disease)     History of coronary angioplasty with insertion of stent     Hyperlipidemia     Kidney stones     Peripheral arterial disease 2020    Seasonal allergies         Family History: family history includes Alcohol abuse in his father; Arthritis in his mother; Asthma in his sister;  Breast cancer in his sister; COPD in his sister; Diabetes type I in his brother; Stroke in his brother. Otherwise pertinent FHx was reviewed and not pertinent to current issue.    Social History:  reports that he quit smoking about 23 years ago. His smoking use included cigarettes. He started smoking about 65 years ago. He has a 84 pack-year smoking history. He has never used smokeless tobacco. He reports current alcohol use. He reports that he does not use drugs.    Home Medications:  aspirin, atorvastatin, cilostazol, glucose blood, isosorbide mononitrate, lisinopril, metoprolol tartrate, multivitamin with minerals, and traMADol    Allergies:  No Known Allergies    Objective    Objective     Vitals:   Temp:  [97.6 °F (36.4 °C)-98 °F (36.7 °C)] 98 °F (36.7 °C)  Heart Rate:  [62-73] 72  Resp:  [18] 18  BP: ()/(40-88) 168/68      Physical Exam:   Constitutional: Awake, alert, No acute distress    Eyes: PERRLA, sclerae anicteric, no conjunctival injection   HENT: NCAT, mucous membranes moist   Neck: Supple, no thyromegaly, no lymphadenopathy, trachea midline   Respiratory: Clear to auscultation bilaterally, nonlabored respirations    Cardiovascular: RRR, 3/6 systolic murmur heard in the aortic area, no S3    Gastrointestinal: Positive bowel sounds, soft, nontender, nondistended   Musculoskeletal: No bilateral ankle edema, no clubbing or cyanosis to extremities   Psychiatric: Appropriate affect, cooperative   Neurologic: Oriented x 3, speech clear   Skin: No rashes     Result Review    Result Review:  I have personally reviewed the results from the time of this admission to 5/23/2024 18:49 EDT and agree with these findings:  [x]  Laboratory  []  Microbiology  [x]  Radiology  [x]  EKG/Telemetry   [x]  Cardiology/Vascular   []  Pathology  [x]  Old records  []  Other:  Most notable findings include:     CMP          2/26/2024    10:20 3/6/2024    10:21 5/23/2024    12:28   CMP   Glucose 114  220  216    BUN 24  25   31    Creatinine 1.76  1.66  2.17    EGFR 38.1  40.9  29.7    Sodium 144  143  140    Potassium 5.5  4.7  4.7    Chloride 104  105  101    Calcium 9.8  9.2  9.3    Total Protein 7.0   6.4    Albumin 4.3   3.8    Globulin 2.7   2.6    Total Bilirubin 0.9   0.9    Alkaline Phosphatase 70   76    AST (SGOT) 18   29    ALT (SGPT) 17   20    Albumin/Globulin Ratio 1.6   1.5    BUN/Creatinine Ratio 13.6  15.1  14.3    Anion Gap 13.0  13.6  11.6       CBC          8/29/2023    11:18 2/26/2024    10:20 5/23/2024    12:28   CBC   WBC 8.26  7.46  9.03    RBC 4.27  4.16  3.84    Hemoglobin 14.7  13.8  13.1    Hematocrit 42.6  40.9  38.9    MCV 99.8  98.3  101.3    MCH 34.4  33.2  34.1    MCHC 34.5  33.7  33.7    RDW 11.9  11.9  12.0    Platelets 265  237  219        Latest Reference Range & Units 05/23/24 12:28 05/23/24 14:32   Creatine Kinase 20 - 200 U/L  284 (H)   CKMB <=10.40 ng/mL  34.78 (H)   HS Troponin T <22 ng/L 200 (C) 306 (C)   Troponin T Delta >=-4 - <+4 ng/L  106 (C)   proBNP 0.0 - 1,800.0 pg/mL 1,088.0        EKG done in the emergency room showed sinus rhythm, Q waves in the inferior leads, no significant changes from previous studies.      Assessment & Plan   Assessment / Plan     Brief Patient Summary:  Rudy Moore is a 83 y.o. male  with coronary artery disease, previous angioplasty with stent placement, chronic kidney disease, peripheral artery disease, previous carotid endarterectomy, status post bilateral iliac stenting and moderate aortic stenosis.  Currently admitted with chest pain and noted to have elevated troponin    Active Hospital Problems:  Active Hospital Problems    Diagnosis     **NSTEMI (non-ST elevated myocardial infarction)      Non-STEMI : Episode of chest pain at 4 AM today, followed by nausea and vomiting.  Symptoms currently subsided.  High-sensitivity troponins are elevated on admission and is currently trending up.  EKG with no new ischemic changes.  On heparin drip    Chronic  kidney disease : Creatinine is 2.17 today, which is above his baseline.  Essential hypertension : Blood pressure on the higher side, did not receive regular medicines today, being in the emergency room    Aortic stenosis: Moderate severity per echocardiogram done earlier this month.    Plan:     Continue heparin infusion per ACS protocol.  Nitroglycerin patch 0.2 mg/h  Restart home aspirin, statin, beta-blockers    Continue to trend cardiac enzymes and serial EKGs  Patient likely need a Cardiac catheterization to delineate the coronary anatomy and angioplasty if indicated.  His creatinine is above baseline.  Hence will initiate IV fluid hydration overnight.    Heart healthy diet now, n.p.o. after midnight    Management plans discussed with the patient and multiple family members at bedside.    Electronically signed by Cm Ryan MD, 05/23/24, 6:49 PM EDT.

## 2024-05-23 NOTE — Clinical Note
Hemostasis started on the right femoral artery. Manual pressure applied to vessel. Manual pressure was held by Emeterio. Manual pressure was held for 15 min. Hemostasis achieved successfully.

## 2024-05-24 LAB
ACT BLD: 140 SECONDS (ref 89–137)
ALBUMIN SERPL-MCNC: 3.6 G/DL (ref 3.5–5.2)
ALBUMIN/GLOB SERPL: 1.6 G/DL
ALP SERPL-CCNC: 70 U/L (ref 39–117)
ALT SERPL W P-5'-P-CCNC: 17 U/L (ref 1–41)
ANION GAP SERPL CALCULATED.3IONS-SCNC: 9.6 MMOL/L (ref 5–15)
APTT PPP: 54.4 SECONDS (ref 78–95.9)
AST SERPL-CCNC: 39 U/L (ref 1–40)
BASOPHILS # BLD AUTO: 0.04 10*3/MM3 (ref 0–0.2)
BASOPHILS NFR BLD AUTO: 0.5 % (ref 0–1.5)
BILIRUB SERPL-MCNC: 0.7 MG/DL (ref 0–1.2)
BUN SERPL-MCNC: 25 MG/DL (ref 8–23)
BUN/CREAT SERPL: 14.3 (ref 7–25)
CALCIUM SPEC-SCNC: 8.9 MG/DL (ref 8.6–10.5)
CHLORIDE SERPL-SCNC: 104 MMOL/L (ref 98–107)
CK MB SERPL-CCNC: 23.07 NG/ML
CK SERPL-CCNC: 265 U/L (ref 20–200)
CO2 SERPL-SCNC: 25.4 MMOL/L (ref 22–29)
CREAT SERPL-MCNC: 1.75 MG/DL (ref 0.76–1.27)
DEPRECATED RDW RBC AUTO: 44.7 FL (ref 37–54)
EGFRCR SERPLBLD CKD-EPI 2021: 38.4 ML/MIN/1.73
EOSINOPHIL # BLD AUTO: 0.26 10*3/MM3 (ref 0–0.4)
EOSINOPHIL NFR BLD AUTO: 3 % (ref 0.3–6.2)
ERYTHROCYTE [DISTWIDTH] IN BLOOD BY AUTOMATED COUNT: 12.1 % (ref 12.3–15.4)
GLOBULIN UR ELPH-MCNC: 2.3 GM/DL
GLUCOSE SERPL-MCNC: 107 MG/DL (ref 65–99)
HCT VFR BLD AUTO: 35 % (ref 37.5–51)
HGB BLD-MCNC: 11.8 G/DL (ref 13–17.7)
IMM GRANULOCYTES # BLD AUTO: 0.03 10*3/MM3 (ref 0–0.05)
IMM GRANULOCYTES NFR BLD AUTO: 0.3 % (ref 0–0.5)
LYMPHOCYTES # BLD AUTO: 2.17 10*3/MM3 (ref 0.7–3.1)
LYMPHOCYTES NFR BLD AUTO: 25.3 % (ref 19.6–45.3)
MAGNESIUM SERPL-MCNC: 1.8 MG/DL (ref 1.6–2.4)
MCH RBC QN AUTO: 34.1 PG (ref 26.6–33)
MCHC RBC AUTO-ENTMCNC: 33.7 G/DL (ref 31.5–35.7)
MCV RBC AUTO: 101.2 FL (ref 79–97)
MONOCYTES # BLD AUTO: 0.68 10*3/MM3 (ref 0.1–0.9)
MONOCYTES NFR BLD AUTO: 7.9 % (ref 5–12)
NEUTROPHILS NFR BLD AUTO: 5.41 10*3/MM3 (ref 1.7–7)
NEUTROPHILS NFR BLD AUTO: 63 % (ref 42.7–76)
NRBC BLD AUTO-RTO: 0 /100 WBC (ref 0–0.2)
PLATELET # BLD AUTO: 201 10*3/MM3 (ref 140–450)
PMV BLD AUTO: 10 FL (ref 6–12)
POTASSIUM SERPL-SCNC: 4 MMOL/L (ref 3.5–5.2)
PROT SERPL-MCNC: 5.9 G/DL (ref 6–8.5)
RBC # BLD AUTO: 3.46 10*6/MM3 (ref 4.14–5.8)
SODIUM SERPL-SCNC: 139 MMOL/L (ref 136–145)
TROPONIN T SERPL HS-MCNC: 365 NG/L
WBC NRBC COR # BLD AUTO: 8.59 10*3/MM3 (ref 3.4–10.8)

## 2024-05-24 PROCEDURE — 85730 THROMBOPLASTIN TIME PARTIAL: CPT | Performed by: INTERNAL MEDICINE

## 2024-05-24 PROCEDURE — 4A023N7 MEASUREMENT OF CARDIAC SAMPLING AND PRESSURE, LEFT HEART, PERCUTANEOUS APPROACH: ICD-10-PCS | Performed by: INTERNAL MEDICINE

## 2024-05-24 PROCEDURE — 99153 MOD SED SAME PHYS/QHP EA: CPT | Performed by: INTERNAL MEDICINE

## 2024-05-24 PROCEDURE — 36415 COLL VENOUS BLD VENIPUNCTURE: CPT | Performed by: INTERNAL MEDICINE

## 2024-05-24 PROCEDURE — 82550 ASSAY OF CK (CPK): CPT | Performed by: INTERNAL MEDICINE

## 2024-05-24 PROCEDURE — 83735 ASSAY OF MAGNESIUM: CPT | Performed by: INTERNAL MEDICINE

## 2024-05-24 PROCEDURE — 94761 N-INVAS EAR/PLS OXIMETRY MLT: CPT

## 2024-05-24 PROCEDURE — 82553 CREATINE MB FRACTION: CPT | Performed by: INTERNAL MEDICINE

## 2024-05-24 PROCEDURE — 80053 COMPREHEN METABOLIC PANEL: CPT | Performed by: INTERNAL MEDICINE

## 2024-05-24 PROCEDURE — C1769 GUIDE WIRE: HCPCS | Performed by: INTERNAL MEDICINE

## 2024-05-24 PROCEDURE — 99152 MOD SED SAME PHYS/QHP 5/>YRS: CPT | Performed by: INTERNAL MEDICINE

## 2024-05-24 PROCEDURE — C1894 INTRO/SHEATH, NON-LASER: HCPCS | Performed by: INTERNAL MEDICINE

## 2024-05-24 PROCEDURE — 84484 ASSAY OF TROPONIN QUANT: CPT | Performed by: INTERNAL MEDICINE

## 2024-05-24 PROCEDURE — 85025 COMPLETE CBC W/AUTO DIFF WBC: CPT | Performed by: INTERNAL MEDICINE

## 2024-05-24 PROCEDURE — 25010000002 FENTANYL CITRATE (PF) 100 MCG/2ML SOLUTION: Performed by: INTERNAL MEDICINE

## 2024-05-24 PROCEDURE — 93454 CORONARY ARTERY ANGIO S&I: CPT | Performed by: INTERNAL MEDICINE

## 2024-05-24 PROCEDURE — 25010000002 HYDRALAZINE PER 20 MG: Performed by: INTERNAL MEDICINE

## 2024-05-24 PROCEDURE — 85347 COAGULATION TIME ACTIVATED: CPT

## 2024-05-24 PROCEDURE — 25010000002 MIDAZOLAM PER 1MG: Performed by: INTERNAL MEDICINE

## 2024-05-24 PROCEDURE — 94799 UNLISTED PULMONARY SVC/PX: CPT

## 2024-05-24 PROCEDURE — 99232 SBSQ HOSP IP/OBS MODERATE 35: CPT | Performed by: FAMILY MEDICINE

## 2024-05-24 PROCEDURE — 25810000003 SODIUM CHLORIDE 0.9 % SOLUTION: Performed by: INTERNAL MEDICINE

## 2024-05-24 PROCEDURE — 25510000001 IOPAMIDOL PER 1 ML: Performed by: INTERNAL MEDICINE

## 2024-05-24 PROCEDURE — B2111ZZ FLUOROSCOPY OF MULTIPLE CORONARY ARTERIES USING LOW OSMOLAR CONTRAST: ICD-10-PCS | Performed by: INTERNAL MEDICINE

## 2024-05-24 PROCEDURE — 25010000002 HEPARIN (PORCINE) 25000-0.45 UT/250ML-% SOLUTION: Performed by: INTERNAL MEDICINE

## 2024-05-24 RX ORDER — ONDANSETRON 4 MG/1
4 TABLET, ORALLY DISINTEGRATING ORAL EVERY 6 HOURS PRN
Status: DISCONTINUED | OUTPATIENT
Start: 2024-05-24 | End: 2024-05-25 | Stop reason: HOSPADM

## 2024-05-24 RX ORDER — MIDAZOLAM HYDROCHLORIDE 2 MG/2ML
INJECTION, SOLUTION INTRAMUSCULAR; INTRAVENOUS
Status: DISCONTINUED | OUTPATIENT
Start: 2024-05-24 | End: 2024-05-24 | Stop reason: HOSPADM

## 2024-05-24 RX ORDER — SODIUM CHLORIDE 9 MG/ML
75 INJECTION, SOLUTION INTRAVENOUS CONTINUOUS
Status: DISCONTINUED | OUTPATIENT
Start: 2024-05-24 | End: 2024-05-24

## 2024-05-24 RX ORDER — CLOPIDOGREL BISULFATE 75 MG/1
TABLET ORAL
Status: DISCONTINUED | OUTPATIENT
Start: 2024-05-24 | End: 2024-05-24 | Stop reason: HOSPADM

## 2024-05-24 RX ORDER — HEPARIN SODIUM 5000 [USP'U]/ML
5000 INJECTION, SOLUTION INTRAVENOUS; SUBCUTANEOUS EVERY 12 HOURS SCHEDULED
Status: DISCONTINUED | OUTPATIENT
Start: 2024-05-25 | End: 2024-05-25 | Stop reason: HOSPADM

## 2024-05-24 RX ORDER — NITROGLYCERIN 0.4 MG/1
0.4 TABLET SUBLINGUAL
Status: DISCONTINUED | OUTPATIENT
Start: 2024-05-24 | End: 2024-05-25 | Stop reason: HOSPADM

## 2024-05-24 RX ORDER — CARVEDILOL 6.25 MG/1
6.25 TABLET ORAL EVERY 12 HOURS SCHEDULED
Status: DISCONTINUED | OUTPATIENT
Start: 2024-05-24 | End: 2024-05-25

## 2024-05-24 RX ORDER — CLOPIDOGREL BISULFATE 75 MG/1
75 TABLET ORAL DAILY
Status: DISCONTINUED | OUTPATIENT
Start: 2024-05-25 | End: 2024-05-25 | Stop reason: HOSPADM

## 2024-05-24 RX ORDER — SODIUM CHLORIDE 9 MG/ML
75 INJECTION, SOLUTION INTRAVENOUS CONTINUOUS
Status: ACTIVE | OUTPATIENT
Start: 2024-05-24 | End: 2024-05-25

## 2024-05-24 RX ORDER — FENTANYL CITRATE 50 UG/ML
INJECTION, SOLUTION INTRAMUSCULAR; INTRAVENOUS
Status: DISCONTINUED | OUTPATIENT
Start: 2024-05-24 | End: 2024-05-24 | Stop reason: HOSPADM

## 2024-05-24 RX ORDER — HYDRALAZINE HYDROCHLORIDE 20 MG/ML
INJECTION INTRAMUSCULAR; INTRAVENOUS
Status: DISCONTINUED | OUTPATIENT
Start: 2024-05-24 | End: 2024-05-24 | Stop reason: HOSPADM

## 2024-05-24 RX ORDER — SODIUM CHLORIDE 9 MG/ML
INJECTION, SOLUTION INTRAVENOUS
Status: COMPLETED | OUTPATIENT
Start: 2024-05-24 | End: 2024-05-24

## 2024-05-24 RX ORDER — ONDANSETRON 2 MG/ML
4 INJECTION INTRAMUSCULAR; INTRAVENOUS EVERY 6 HOURS PRN
Status: DISCONTINUED | OUTPATIENT
Start: 2024-05-24 | End: 2024-05-25 | Stop reason: HOSPADM

## 2024-05-24 RX ORDER — LIDOCAINE HYDROCHLORIDE 20 MG/ML
INJECTION, SOLUTION INFILTRATION; PERINEURAL
Status: DISCONTINUED | OUTPATIENT
Start: 2024-05-24 | End: 2024-05-24 | Stop reason: HOSPADM

## 2024-05-24 RX ORDER — ZOLPIDEM TARTRATE 5 MG/1
5 TABLET ORAL NIGHTLY PRN
Status: DISCONTINUED | OUTPATIENT
Start: 2024-05-24 | End: 2024-05-25 | Stop reason: HOSPADM

## 2024-05-24 RX ORDER — HEPARIN SODIUM 5000 [USP'U]/ML
5000 INJECTION, SOLUTION INTRAVENOUS; SUBCUTANEOUS EVERY 8 HOURS SCHEDULED
Status: DISCONTINUED | OUTPATIENT
Start: 2024-05-24 | End: 2024-05-24

## 2024-05-24 RX ADMIN — SODIUM CHLORIDE 75 ML/HR: 9 INJECTION, SOLUTION INTRAVENOUS at 12:17

## 2024-05-24 RX ADMIN — HEPARIN SODIUM 11.79 UNITS/KG/HR: 10000 INJECTION, SOLUTION INTRAVENOUS at 09:21

## 2024-05-24 RX ADMIN — CARVEDILOL 6.25 MG: 6.25 TABLET, FILM COATED ORAL at 20:24

## 2024-05-24 RX ADMIN — CILOSTAZOL 100 MG: 100 TABLET ORAL at 20:25

## 2024-05-24 RX ADMIN — ASPIRIN 81 MG: 81 TABLET, COATED ORAL at 12:24

## 2024-05-24 RX ADMIN — CARVEDILOL 6.25 MG: 6.25 TABLET, FILM COATED ORAL at 12:17

## 2024-05-24 RX ADMIN — NITROGLYCERIN 1 PATCH: 0.2 PATCH TRANSDERMAL at 12:22

## 2024-05-24 RX ADMIN — Medication 10 ML: at 12:25

## 2024-05-24 RX ADMIN — ATORVASTATIN CALCIUM 20 MG: 20 TABLET, FILM COATED ORAL at 20:24

## 2024-05-24 RX ADMIN — SODIUM CHLORIDE 75 ML/HR: 9 INJECTION, SOLUTION INTRAVENOUS at 09:08

## 2024-05-24 RX ADMIN — METOPROLOL TARTRATE 25 MG: 25 TABLET, FILM COATED ORAL at 09:20

## 2024-05-24 RX ADMIN — CILOSTAZOL 100 MG: 100 TABLET ORAL at 09:20

## 2024-05-24 NOTE — PROGRESS NOTES
[facility]     Cardiology Progress Note    Patient Name: Rudy Moore  : 1940  MRN: [id number]  Primary Care Physician:  Kim Dailey, RUSS  Date of admission: 2024    Subjective   Subjective     Chief Complaint: Follow-up visit for chest pain, elevated troponin    Interval HPI:    Patient denies any further episodes of chest pain overnight.  Denies shortness of breath or palpitations.  Nausea subsided.  He was unable to sleep last night.    Review of Systems   All systems were reviewed and negative except for: Chest pain and nausea, currently subsided    Objective   Objective     Vitals:   Temp:  [97.3 °F (36.3 °C)-98.1 °F (36.7 °C)] 97.3 °F (36.3 °C)  Heart Rate:  [62-85] 77  Resp:  [16-18] 16  BP: ()/(40-95) 184/77  Physical Exam      General : Alert, awake, no acute distress  CVS : Regular rate and rhythm, no murmur, rubs or gallops  Lungs: Clear to auscultation bilaterally, no crackles or rhonchi  Abdomen: Soft, nontender, bowel sounds heard in all 4 quadrants  Extremities: Warm, well-perfused, no pedal edema    Scheduled Meds:aspirin, 81 mg, Oral, Daily  atorvastatin, 20 mg, Oral, Nightly  cilostazol, 100 mg, Oral, BID  metoprolol tartrate, 25 mg, Oral, Q12H  nitroglycerin, 1 patch, Transdermal, Daily  sodium chloride, 10 mL, Intravenous, Q12H      Continuous Infusions:heparin, 12 Units/kg/hr, Last Rate: 11.79 Units/kg/hr (24)  sodium chloride, 75 mL/hr, Last Rate: 75 mL/hr (24)  sodium chloride, 75 mL/hr           Result Review    Result Review:  I have personally reviewed the results from the time of this admission to 2024 08:06 EDT and agree with these findings:  [x]  Laboratory  []  Microbiology  [x]  Radiology  [x]  EKG/Telemetry   [x]  Cardiology/Vascular   []  Pathology  []  Old records  []  Other:  Most notable findings include:     CBC          2024    10:20 2024    12:28 2024    04:12   CBC   WBC 7.46  9.03  8.59     RBC 4.16  3.84  3.46    Hemoglobin 13.8  13.1  11.8    Hematocrit 40.9  38.9  35.0    MCV 98.3  101.3  101.2    MCH 33.2  34.1  34.1    MCHC 33.7  33.7  33.7    RDW 11.9  12.0  12.1    Platelets 237  219  201      CMP          3/6/2024    10:21 5/23/2024    12:28 5/24/2024    04:12   CMP   Glucose 220  216  107    BUN 25  31  25    Creatinine 1.66  2.17  1.75    EGFR 40.9  29.7  38.4    Sodium 143  140  139    Potassium 4.7  4.7  4.0    Chloride 105  101  104    Calcium 9.2  9.3  8.9    Total Protein  6.4  5.9    Albumin  3.8  3.6    Globulin  2.6  2.3    Total Bilirubin  0.9  0.7    Alkaline Phosphatase  76  70    AST (SGOT)  29  39    ALT (SGPT)  20  17    Albumin/Globulin Ratio  1.5  1.6    BUN/Creatinine Ratio 15.1  14.3  14.3    Anion Gap 13.6  11.6  9.6       CARDIAC LABS:       Latest Reference Range & Units 05/23/24 12:28 05/23/24 14:32 05/24/24 04:12   Creatine Kinase 20 - 200 U/L  284 (H) 265 (H)   CKMB <=10.40 ng/mL  34.78 (H) 23.07 (H)   HS Troponin T <22 ng/L 200 (C) 306 (C) 365 (C)   Troponin T Delta >=-4 - <+4 ng/L  106 (C)    proBNP 0.0 - 1,800.0 pg/mL 1,088.0         Assessment & Plan   Assessment / Plan     Brief Patient Summary:  Rudy Moore is a 83 y.o. male with coronary artery disease, previous angioplasty with stent placement, chronic kidney disease, peripheral artery disease, previous carotid endarterectomy, status post bilateral iliac stenting and moderate aortic stenosis .  Currently admitted with chest pain and elevated troponin    Active Hospital Problems:  Active Hospital Problems    Diagnosis     **NSTEMI (non-ST elevated myocardial infarction)      Non-STEMI : Episode of chest pain at 4 AM yesterday, followed by nausea and vomiting.  High-sensitivity troponins are elevated on admission and is currently trending up.  EKG with no new ischemic changes.  On heparin drip, currently chest pain-free     Chronic kidney disease : Creatinine is 2.17 today, which is above his  baseline.  Essential hypertension : Blood pressure on the higher side     Aortic stenosis: Moderate severity per echocardiogram done earlier this month.      Plan:     Definitive management options were discussed in detail with the patient today and also the spouse yesterday.  Because of uptrending cardiac markers and presentation with chest pain, in the background of coronary artery disease, recommend cardiac catheterization to delineate coronary anatomy and angioplasty if indicated.  The risks, benefits and alternatives of the test were explained detail the patient and he agreed to proceed.  There is increased risk of worsening renal functions following cardiac cath.  He was on IV fluid hydration overnight and the creatinine levels are back to his baseline today.    Continue aspirin, statin, beta-blocker and nitroglycerin patch.  Further management plans based on cath outcomes       CODE STATUS:   Level Of Support Discussed With: Patient  Code Status (Patient has no pulse and is not breathing): CPR (Attempt to Resuscitate)  Medical Interventions (Patient has pulse or is breathing): Full Support      Electronically signed by Cm Ryan MD, 05/24/24, 8:06 AM EDT.    ADDENDUM     Cardiac cath done today, it showed chronic total occlusion of the right coronary artery with nonobstructive lesions in the left circumflex artery.  There are good left-to-right collaterals filling the distal right coronary artery.  Please see cardiac cath report for further details.    Recommend maximal medical management    Dual antiplatelet therapy with aspirin and Plavix for 6 months.  Patient was loaded with Plavix in the Cath Lab  Changing metoprolol tartrate to carvedilol 6.25 mg twice daily, dose to be titrated up as needed  Continue nitroglycerin patch  Holding lisinopril due to chronic kidney disease with a creatinine above baseline  Continue gentle IV hydration for another 12 hours    Possible discharge in the morning if  patient remains stable and renal functions are back to baseline.      Electronically signed by Cm Ryan MD, 05/24/24, 12:12 PM EDT.

## 2024-05-24 NOTE — CASE MANAGEMENT/SOCIAL WORK
Discharge Planning Assessment   Kay     Patient Name: Rudy Moore  MRN: [id number]  Today's Date: 5/24/2024    Admit Date: 5/23/2024        Discharge Needs Assessment       Row Name 05/24/24 0847       Living Environment    People in Home child(racquel), dependent;spouse    Name(s) of People in Home Granddaughter (whom wife has custody of) and wife    Current Living Arrangements home    Potentially Unsafe Housing Conditions none    In the past 12 months has the electric, gas, oil, or water company threatened to shut off services in your home? No    Primary Care Provided by self    Provides Primary Care For grandchild(racquel)    Family Caregiver if Needed child(racquel), adult;spouse    Quality of Family Relationships supportive;involved;helpful    Able to Return to Prior Arrangements yes       Resource/Environmental Concerns    Resource/Environmental Concerns none    Transportation Concerns none       Transportation Needs    In the past 12 months, has lack of transportation kept you from medical appointments or from getting medications? no    In the past 12 months, has lack of transportation kept you from meetings, work, or from getting things needed for daily living? No       Food Insecurity    Within the past 12 months, you worried that your food would run out before you got the money to buy more. Never true    Within the past 12 months, the food you bought just didn't last and you didn't have money to get more. Never true       Transition Planning    Patient/Family Anticipates Transition to home;home with family    Patient/Family Anticipated Services at Transition none    Transportation Anticipated car, drives self;family or friend will provide       Discharge Needs Assessment    Readmission Within the Last 30 Days no previous admission in last 30 days    Equipment Currently Used at Home cane, straight    Concerns to be Addressed no discharge needs identified;denies needs/concerns at this time    Anticipated  Changes Related to Illness none    Equipment Needed After Discharge none                   Discharge Plan    No documentation.                 Continued Care and Services - Admitted Since 5/23/2024    No active coordination exists for this encounter.          Demographic Summary       Row Name 05/24/24 0845       General Information    Admission Type inpatient    Arrived From physician office - external    Referral Source emergency department    Reason for Consult discharge planning    Preferred Language English       Contact Information    Permission Granted to Share Info With facility     Contact Information Obtained for facility     Contact Information Comments Pts wife, Angelika                   Functional Status       Row Name 05/24/24 0846       Functional Status    Usual Activity Tolerance moderate    Current Activity Tolerance fair       Physical Activity    On average, how many days per week do you engage in moderate to strenuous exercise (like a brisk walk)? 7 days    On average, how many minutes do you engage in exercise at this level? 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 Sometimes    How often do you have problems learning about your medical condition because of difficulty understanding written information? Sometimes    How often do you have a problem understanding what is told to you about your medical condition? Sometimes    How confident are you filling out medical forms by yourself? Somewhat    Health Literacy Moderate       Functional Status, IADL    Medications independent    Meal Preparation independent    Housekeeping independent    Laundry independent    Shopping independent       Mental Status    General Appearance WDL WDL       Mental Status Summary    Recent  Changes in Mental Status/Cognitive Functioning no changes                   Psychosocial    No documentation.                  Abuse/Neglect       Row Name 05/24/24 0847       Personal Safety    Feels Unsafe at Home or Work/School no    Feels Threatened by Someone no    Does Anyone Try to Keep You From Having Contact with Others or Doing Things Outside Your Home? no    Physical Signs of Abuse Present no                   Legal    No documentation.                  Substance Abuse    No documentation.                  Patient Forms    No documentation.                 SW met with pt and his wife at bedside this date to complete discharge needs assessment. Pt reports that he does not have any discharge needs at this time. He reports that his family is very supportive and he has adult children that can assist as needed with his wife. 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 He reports that his wife would like for him to divide the work up differently but he likes to get all done once he starts. He denied any stress/depressed feelings this date. Pt reports that his PCP is RUSS Jordan and his pharmacy is [email or web address] in [place]. His wife does report that they also use a mail order pharmacy for some of his prescriptions. Pt denied any oxygen at home or DME needs at this time. He denied needing any HHC or any services upon discharge at this time as well.     ANY Padilla

## 2024-05-24 NOTE — PLAN OF CARE
Goal Outcome Evaluation:  Plan of Care Reviewed With: patient, spouse        Progress: improving       R heart cath done.  Site clean dry some tenderness.  Off bedrest.  IVF's continued.  Educated spouse and pt.  Care plan is ongoing.

## 2024-05-24 NOTE — PAYOR COMM NOTE
"Rudy Smith (83 y.o. Male)       Date of Birth   1940    Social Security Number       Address   [address] [place] [address]    Home Phone   [phone]    MRN   [id number]       Pentecostalism   Nondenominational    Marital Status                               Admission Date   5/23/24    Admission Type   Emergency    Admitting Provider   Bebeto Hamilton MD    Attending Provider   Giuseppe Wilcox MD    Department, Room/Bed   [address], 257/1       Discharge Date       Discharge Disposition       Discharge Destination                                 Attending Provider: Giuseppe Wilcox MD    Allergies: No Known Allergies    Isolation: None   Infection: None   Code Status: CPR    Ht: 175.3 cm (69\")   Wt: 90.9 kg (200 lb 6.4 oz)    Admission Cmt: None   Principal Problem: NSTEMI (non-ST elevated myocardial infarction) [I21.4]                   Active Insurance as of 5/23/2024       Primary Coverage       Payor Plan Insurance Group Employer/Plan Group    ANTH MEDICARE REPLACEMENT ANTHEM MEDICARE ADVANTAGE [id number]       Payor Plan Address Payor Plan Phone Number Payor Plan Fax Number Effective Dates    [address] [phone]  1/1/2022 - None Entered    [facility] [id number]         Subscriber Name Subscriber Birth Date Member ID       RUDY SMITH 1940 [id number]                     Emergency Contacts        (Rel.) Home Phone Work Phone Mobile Phone    JOSE CRUZ SMITH (Spouse) -- -- [phone]           Myocardial Infarction [facility] Inpatient Care       Indications Met   Last updated by Lien Ham on 5/23/2024 7885     Review Status Created By   Primary Completed Lien Ham      Criteria Review   Myocardial Infarction [facility] Inpatient [facility]     Overall Determination: Indications Met     Criteria:  [×] Admission is indicated for  1 or more  of the following :      [×] Acute myocardial infarction (MI) [G] [H] (not in context " of cardiac procedure within last 48 hours), as indicated by  ALL  of the following :          [×] Elevated cardiac troponin level, [I] [J] [K] [L] as indicated by  1 or more  of the following :              [×] Initial troponin elevated with subsequent increase or decrease in level of 20% or more (ie, indicative of acute myocardial injury)                  2024  5:49 PM                      -- 2024  5:49 PM by Lien Ham --                          HS Trop 200, 306 - Increase 53%          [×] Myocardial injury due to acute ischemia, as indicated by  1 or more  of the following :              [×] Symptoms consistent with myocardial ischemia (eg, chest pain, dyspnea)                  2024  5:49 PM                      -- 2024  5:49 PM by Lien Ham --                          Significant CP today     Notes:  -- 2024  5:49 PM by Lien Ham --            *       Heparin gtt      *       consult cardiology        -- 2024  5:49 PM by Lien Ham --            *       chest pain on and off for a month, However he got up to go to the bathroom this morning at 4 AM when he was walking towards the bathroom started developing significant chest pain.       *       HS Trop 200, 306      *       Trop Delta 106      *       EKG Sinus rhythm      Inferior infarct, old                  History & Physical        Bebeto Hamilton MD at [address] [place], [place] [address]IST HISTORY AND PHYSICAL  Date: 2024   Patient Name: Rudy Moore  : 1940  MRN: [id number]  Primary Care Physician:  Kim Dailey, RUSS  Date of admission: 2024    Subjective  Subjective     Chief Complaint: Chest pain    HPI:    Rudy Moore is a 83 y.o. male past medical history of CKD with baseline creatinine in need of 1.5-1.7, coronary disease, type 2 diabetes not on any medication, hypertension, peripheral vascular disease, and aortic stenosis who presents  the chest pain    Patient's states that he has had some chest pain ongoing for a month.  He had a couple pretty significant episodes.  However he got up to go to the bathroom this morning at 4 AM when he was walking towards the bathroom started developing significant chest pain.  He had a doctor for this cardiology today who told him to come to the emergency department.  He also had some nausea and, diaphoresis and, some presyncopal symptoms.  As result he came to the ER for further evaluation.    In the emergency department his blood pressure is 97.6,, pulse of 68, respiratory rate of 18, blood pressure 108/62, 94% on room air.  CBC shows no concerning abnormalities except for an MCV of 101.  CMP shows a bicarb of 27.4 and a creatinine of 2.17 with a baseline creatinine with baseline creatinine 1.5-1.7.  Initial troponin was 200 repeat troponin is 306 with a CK-MB of 34.78.  Cardiology was consulted.  The patient was heparinized.  The patient admitted the hospital for continued management of NSTEMI.    All systems reviewed abnormalities noted above    Personal History     Past Medical History:  CKD stage IIIb with baseline creatinine 1.5-1.7  Coronary artery disease  Type 2 diabetes  Hypertension   peripheral vascular disease  Carotid artery  Moderate to moderately severe aortic stenosis with a mean gradient of 24mm    Past Surgical History:  CABG  Carotid endarterectomy  Femoral endarterectomy  Iliac artery stent    Family History:   Alcohol abuse  Arthritis  Breast cancer    Social History:   Former.  Drinks several drinks a day    Home Medications:  aspirin, atorvastatin, cilostazol, glucose blood, isosorbide mononitrate, lisinopril, metoprolol tartrate, multivitamin with minerals, and traMADol    Allergies:  No Known Allergies      Objective  Objective     Vitals:   Temp:  [97.6 °F (36.4 °C)] 97.6 °F (36.4 °C)  Heart Rate:  [62-71] 68  Resp:  [18] 18  BP: ()/(40-88) 108/62    Physical  Exam    Constitutional: Awake, alert, no acute distress   Eyes: Pupils equal, sclerae anicteric, no conjunctival injection   HENT: NCAT, mucous membranes moist   Neck: Supple, no thyromegaly, no lymphadenopathy, trachea midline   Respiratory: Clear to auscultation bilaterally, nonlabored respirations    Cardiovascular: RRR, 3 out of 6 systolic ejection murmur at the right upper sternal border, rubs, or gallops, palpable pedal pulses bilaterally   Gastrointestinal: Positive bowel sounds, soft, nontender, nondistended   Musculoskeletal: No bilateral ankle edema, no clubbing or cyanosis to extremities   Psychiatric: Appropriate affect, cooperative   Neurologic: Oriented x 3, strength symmetric in all extremities, Cranial Nerves grossly intact to confrontation, speech clear   Skin: No rashes     Result Review   Result Review:  I have personally reviewed the results from the time of this admission to 5/23/2024 15:36 EDT and agree with these findings:  Troponin of 306  CK-MB of 34    Assessment & Plan  Assessment / Plan     Assessment/Plan:   NSTEMI  Acute kidney injury with baseline creatinine 1.5-1.8 currently 2.17  Moderate to moderately severe aortic stenosis  Hypertension  Peripheral vascular disease    Plan:  --Admit to hospital service  -- Consult cardiology  -- Trend troponins  -- Continue heparin drip  -- Patient also has mild acute kidney injury so we will give a very mild fluid resuscitation with 50 cc of normal saline over 10 hours.  EF is normal but does have moderately severe aortic stenosis.  -- Continue metoprolol  -- Hold lisinopril due to acute kidney injury  -- Continue aspirin    DVT prophylaxis:  Heparin drip        CODE STATUS:     Full code    Admission Status:  I believe this patient meets admission status.    Electronically signed by Bebeto Hamilton MD, 05/23/24, 3:36 PM EDT.             Electronically signed by Bebeto Hamilton MD at 05/23/24 0709       Emergency Department Notes    No notes of this type  exist for this encounter.       Physician Progress Notes (last 24 hours)  Notes from 24 0810 through 24 0810   No notes of this type exist for this encounter.          Consult Notes (last 24 hours)        Cm Ryan MD at 24 1849        Consult Orders    1. Cardiology (on-call MD unless specified) [[id number]] ordered by John Etienne DO at 24 1421                   [facility]   Cardiology Consult Note    Patient Name: Rudy Moore  : 1940  MRN: [id number]  Primary Care Physician:  Kim Dailey APRN  Referring Physician: John Etienne DO    Date of admission: 2024    Subjective   Subjective     Reason for Consultation : Chest pain, elevated troponin    Chief Complaint : Chest pain, nausea, vomiting    HPI:  Rudy Moore is a 83 y.o. male with coronary artery disease, previous angioplasty with stent placement, chronic kidney disease, peripheral artery disease, previous carotid endarterectomy, status post bilateral iliac stenting and moderate aortic stenosis.  He present to the emergency room because of chest pain, nausea and vomiting.  He woke up around 4 AM, and was walking the bathroom developed significant chest pain.  He also had nausea and actually threw up.  Other symptoms include dizziness.  He was seen in cardiology clinic today and was directed to the emergency room.    He was chest pain-free on arrival to the ER.  Labs reveal a troponin of 200 which subsequently trended up.  Creatinine was 2.1 which was above his baseline.  He was started on heparin infusion.  At this time, patient denies any chest pain, shortness of breath or palpitations.  Nausea and vomiting subsided.  He has been n.p.o. since morning.    Review of Systems   All systems were reviewed and negative except for: Chest pain, nausea, vomiting.    Personal History     Past Medical History:   Diagnosis Date    CAD S/P percutaneous coronary angioplasty 2022    Cataract      Diabetes mellitus, type 2     Essential hypertension 07/23/2020    GERD (gastroesophageal reflux disease)     History of coronary angioplasty with insertion of stent     Hyperlipidemia     Kidney stones     Peripheral arterial disease 07/23/2020    Seasonal allergies         Family History: family history includes Alcohol abuse in his father; Arthritis in his mother; Asthma in his sister; Breast cancer in his sister; COPD in his sister; Diabetes type I in his brother; Stroke in his brother. Otherwise pertinent FHx was reviewed and not pertinent to current issue.    Social History:  reports that he quit smoking about 23 years ago. His smoking use included cigarettes. He started smoking about 65 years ago. He has a 84 pack-year smoking history. He has never used smokeless tobacco. He reports current alcohol use. He reports that he does not use drugs.    Home Medications:  aspirin, atorvastatin, cilostazol, glucose blood, isosorbide mononitrate, lisinopril, metoprolol tartrate, multivitamin with minerals, and traMADol    Allergies:  No Known Allergies    Objective    Objective     Vitals:   Temp:  [97.6 °F (36.4 °C)-98 °F (36.7 °C)] 98 °F (36.7 °C)  Heart Rate:  [62-73] 72  Resp:  [18] 18  BP: ()/(40-88) 168/68      Physical Exam:   Constitutional: Awake, alert, No acute distress    Eyes: PERRLA, sclerae anicteric, no conjunctival injection   HENT: NCAT, mucous membranes moist   Neck: Supple, no thyromegaly, no lymphadenopathy, trachea midline   Respiratory: Clear to auscultation bilaterally, nonlabored respirations    Cardiovascular: RRR, 3/6 systolic murmur heard in the aortic area, no S3    Gastrointestinal: Positive bowel sounds, soft, nontender, nondistended   Musculoskeletal: No bilateral ankle edema, no clubbing or cyanosis to extremities   Psychiatric: Appropriate affect, cooperative   Neurologic: Oriented x 3, speech clear   Skin: No rashes     Result Review    Result Review:  I have personally  reviewed the results from the time of this admission to 5/23/2024 18:49 EDT and agree with these findings:  [x]  Laboratory  []  Microbiology  [x]  Radiology  [x]  EKG/Telemetry   [x]  Cardiology/Vascular   []  Pathology  [x]  Old records  []  Other:  Most notable findings include:     CMP          2/26/2024    10:20 3/6/2024    10:21 5/23/2024    12:28   CMP   Glucose 114  220  216    BUN 24  25  31    Creatinine 1.76  1.66  2.17    EGFR 38.1  40.9  29.7    Sodium 144  143  140    Potassium 5.5  4.7  4.7    Chloride 104  105  101    Calcium 9.8  9.2  9.3    Total Protein 7.0   6.4    Albumin 4.3   3.8    Globulin 2.7   2.6    Total Bilirubin 0.9   0.9    Alkaline Phosphatase 70   76    AST (SGOT) 18   29    ALT (SGPT) 17   20    Albumin/Globulin Ratio 1.6   1.5    BUN/Creatinine Ratio 13.6  15.1  14.3    Anion Gap 13.0  13.6  11.6       CBC          8/29/2023    11:18 2/26/2024    10:20 5/23/2024    12:28   CBC   WBC 8.26  7.46  9.03    RBC 4.27  4.16  3.84    Hemoglobin 14.7  13.8  13.1    Hematocrit 42.6  40.9  38.9    MCV 99.8  98.3  101.3    MCH 34.4  33.2  34.1    MCHC 34.5  33.7  33.7    RDW 11.9  11.9  12.0    Platelets 265  237  219        Latest Reference Range & Units 05/23/24 12:28 05/23/24 14:32   Creatine Kinase 20 - 200 U/L  284 (H)   CKMB <=10.40 ng/mL  34.78 (H)   HS Troponin T <22 ng/L 200 (C) 306 (C)   Troponin T Delta >=-4 - <+4 ng/L  106 (C)   proBNP 0.0 - 1,800.0 pg/mL 1,088.0        EKG done in the emergency room showed sinus rhythm, Q waves in the inferior leads, no significant changes from previous studies.      Assessment & Plan   Assessment / Plan     Brief Patient Summary:  Rudy Moore is a 83 y.o. male  with coronary artery disease, previous angioplasty with stent placement, chronic kidney disease, peripheral artery disease, previous carotid endarterectomy, status post bilateral iliac stenting and moderate aortic stenosis.  Currently admitted with chest pain and noted to have  elevated troponin    Active Hospital Problems:  Active Hospital Problems    Diagnosis     **NSTEMI (non-ST elevated myocardial infarction)      Non-STEMI : Episode of chest pain at 4 AM today, followed by nausea and vomiting.  Symptoms currently subsided.  High-sensitivity troponins are elevated on admission and is currently trending up.  EKG with no new ischemic changes.  On heparin drip    Chronic kidney disease : Creatinine is 2.17 today, which is above his baseline.  Essential hypertension : Blood pressure on the higher side, did not receive regular medicines today, being in the emergency room    Aortic stenosis: Moderate severity per echocardiogram done earlier this month.    Plan:     Continue heparin infusion per ACS protocol.  Nitroglycerin patch 0.2 mg/h  Restart home aspirin, statin, beta-blockers    Continue to trend cardiac enzymes and serial EKGs  Patient likely need a Cardiac catheterization to delineate the coronary anatomy and angioplasty if indicated.  His creatinine is above baseline.  Hence will initiate IV fluid hydration overnight.    Heart healthy diet now, n.p.o. after midnight    Management plans discussed with the patient and multiple family members at bedside.    Electronically signed by Cm Ryan MD, 05/23/24, 6:49 PM EDT.    Electronically signed by Cm Ryan MD at 05/23/24 6623     [facility] ,[facility] [phone]  F [phone]

## 2024-05-24 NOTE — PLAN OF CARE
Goal Outcome Evaluation:      There have been no acute changes through night. Patient on heparin drip, see MAR. No complaints at this time. Call light is in reach.

## 2024-05-24 NOTE — PROGRESS NOTES
[facility]   Hospitalist Progress Note  Date: 2024  Patient Name: Rudy Moore  : 1940  MRN: [id number]  Date of admission: 2024      Subjective   Subjective     Chief complaint: Chest pain    Summary:  83-year-old male with CKD stage IIIb, CAD, diabetes, hypertension, PVD, carotid artery disease, moderate severe aortic stenosis, underlying history of CABG, vascular surgeries involving the lower extremity, hospitalized on 2020 for chief complaint of chest pain, diagnosed with NSTEMI, cardiology consulted, planned cardiac cath, no new findings, patent stent proximal left circumflex with 30 to 40% in-stent restenosis, nonobstructive and borderline lesions in the proximal and mid left circumflex artery, with 100% chronic total occlusion of right coronary inserted previously placed stent with left to right collateral filling mid to distal right coronary artery.  Pursuing aggressive medical management with dual antiplatelet therapy and antianginal medications.    Interval follow-up: Seen and examined prior to cardiac cath, no acute distress, no acute major night events, felt some nausea overnight, no chest pain this morning.  Telemetry reviewed, no acute major arrhythmic events.  Troponin trended up, cardiology planning cardiac cath, reviewed cardiac cath results after rounds.  Troponin 365, creatinine 1.75, potassium 4.0, sodium 139, white blood cell count 8000.    Review of systems:  All systems reviewed negative except for intermittent nausea, previously having chest pain    Objective   Objective     Vitals:   Temp:  [97.3 °F (36.3 °C)-98.2 °F (36.8 °C)] 98.2 °F (36.8 °C)  Heart Rate:  [69-85] 76  Resp:  [16-22] 19  BP: (106-184)/() 106/60  Flow (L/min):  [2] 2  Physical Exam               Constitutional: Awake, alert, no acute distress              Eyes: Pupils equal, sclerae anicteric, no conjunctival injection              HENT: NCAT, mucous membranes moist               Neck: Supple, no thyromegaly, no lymphadenopathy, trachea midline              Respiratory: Clear to auscultation bilaterally, nonlabored respirations               Cardiovascular: RRR, 3 out of 6 systolic ejection murmur at the right upper sternal border, rubs, or gallops, palpable pedal pulses bilaterally              Gastrointestinal: Positive bowel sounds, soft, nontender, nondistended              Musculoskeletal: No bilateral ankle edema, no clubbing or cyanosis to extremities              Psychiatric: Appropriate affect, cooperative              Neurologic: Oriented x 3, strength symmetric in all extremities, Cranial Nerves grossly intact to confrontation, speech clear              Skin: No rashes     Result Review    Result Review:  I have personally reviewed the pertinent results from the past 24 hours to 5/24/2024 14:19 EDT and agree with these findings:  [x]  Laboratory   CBC          2/26/2024    10:20 5/23/2024    12:28 5/24/2024    04:12   CBC   WBC 7.46  9.03  8.59    RBC 4.16  3.84  3.46    Hemoglobin 13.8  13.1  11.8    Hematocrit 40.9  38.9  35.0    MCV 98.3  101.3  101.2    MCH 33.2  34.1  34.1    MCHC 33.7  33.7  33.7    RDW 11.9  12.0  12.1    Platelets 237  219  201      BMP          3/6/2024    10:21 5/23/2024    12:28 5/24/2024    04:12   BMP   BUN 25  31  25    Creatinine 1.66  2.17  1.75    Sodium 143  140  139    Potassium 4.7  4.7  4.0    Chloride 105  101  104    CO2 24.4  27.4  25.4    Calcium 9.2  9.3  8.9      LIVER FUNCTION TESTS:      Lab 05/24/24  0412 05/23/24  1228   TOTAL PROTEIN 5.9* 6.4   ALBUMIN 3.6 3.8   GLOBULIN 2.3 2.6   ALT (SGPT) 17 20   AST (SGOT) 39 29   BILIRUBIN 0.7 0.9   ALK PHOS 70 76   LIPASE  --  30       [x]  Microbiology   Microbiology Results (last 10 days)       ** No results found for the last 240 hours. **              [x]  Radiology XR Chest 1 View    Result Date: 5/23/2024  No acute cardiopulmonary process identified.   Electronically Signed By-Miki  MD Los On:5/23/2024 12:41 PM         [x]  EKG/Telemetry   ECG 12 Lead ED Triage Standing Order; Chest Pain   Preliminary Result   HEART RATE= 67  bpm   RR Interval= 888  ms   SC Interval= 156  ms   P Horizontal Axis= -8  deg   P Front Axis= 69  deg   QRSD Interval= 95  ms   QT Interval= 397  ms   QTcB= 421  ms   QRS Axis= 4  deg   T Wave Axis= 46  deg   - ABNORMAL ECG -   Sinus rhythm   Inferior infarct, old   Electronically Signed By:    Date and Time of Study: 2024-05-23 14:18:12      ECG 12 Lead ED Triage Standing Order; Chest Pain   Preliminary Result   HEART RATE= 68  bpm   RR Interval= 888  ms   SC Interval= 155  ms   P Horizontal Axis= -2  deg   P Front Axis= 72  deg   QRSD Interval= 92  ms   QT Interval= 401  ms   QTcB= 426  ms   QRS Axis= 15  deg   T Wave Axis= 31  deg   - ABNORMAL ECG -   Sinus rhythm   Inferior infarct, old   No previous ECG available for comparison   Electronically Signed By:    Date and Time of Study: 2024-05-23 12:11:30          []  Cardiology/Vascular   []  Pathology  [x]  Old records  []  Other:    Assessment & Plan   Assessment / Plan     Assessment/Plan:  Assessment:  NSTEMI  Acute kidney injury with baseline creatinine 1.5-1.8 currently 2.17  Moderate to moderately severe aortic stenosis  Hypertension  Peripheral vascular disease    Plan:  Labs and imaging reviewed  Continue IV hydration while monitoring creatinine after postcardiac cath contrast exposure  Off heparin drip  Resume heparin for DVT prophylaxis  Continue Coreg 6.25 mg p.o. twice daily  Continue Lipitor 20 mg nightly  Continue aspirin 81 mg daily with Plavix 75 mg daily  Continue Nitropatch twice daily  A.m. labs  Full code  Telemetry monitor  DVT prophylaxis on heparin  Cardiology recommendations appreciated  Discussed with nurse at bedside    DVT prophylaxis:  Medical DVT prophylaxis orders are present.        CODE STATUS:   Level Of Support Discussed With: Patient  Code Status (Patient has no pulse and is  not breathing): CPR (Attempt to Resuscitate)  Medical Interventions (Patient has pulse or is breathing): Full Support        Electronically signed by Giuseppe Wilcox MD, 5/24/2024, 14:19 EDT.    Portions of this documentation were transcribed electronically from a voice recognition software.  I confirm all data accurately represents the service(s) I performed at today's visit.

## 2024-05-25 ENCOUNTER — READMISSION MANAGEMENT (OUTPATIENT)
Dept: CALL CENTER | Facility: HOSPITAL | Age: 84
End: 2024-05-25
Payer: MEDICARE

## 2024-05-25 VITALS
HEART RATE: 95 BPM | OXYGEN SATURATION: 100 % | BODY MASS INDEX: 27.23 KG/M2 | WEIGHT: 183.86 LBS | TEMPERATURE: 97.9 F | DIASTOLIC BLOOD PRESSURE: 80 MMHG | RESPIRATION RATE: 18 BRPM | SYSTOLIC BLOOD PRESSURE: 192 MMHG | HEIGHT: 69 IN

## 2024-05-25 PROBLEM — I25.700 CORONARY ARTERY DISEASE INVOLVING CORONARY BYPASS GRAFT OF NATIVE HEART WITH UNSTABLE ANGINA PECTORIS: Status: ACTIVE | Noted: 2024-05-25

## 2024-05-25 LAB
ALBUMIN SERPL-MCNC: 3.6 G/DL (ref 3.5–5.2)
ALP SERPL-CCNC: 71 U/L (ref 39–117)
ALT SERPL W P-5'-P-CCNC: 16 U/L (ref 1–41)
ANION GAP SERPL CALCULATED.3IONS-SCNC: 8.2 MMOL/L (ref 5–15)
AST SERPL-CCNC: 27 U/L (ref 1–40)
BASOPHILS # BLD AUTO: 0.03 10*3/MM3 (ref 0–0.2)
BASOPHILS NFR BLD AUTO: 0.4 % (ref 0–1.5)
BILIRUB CONJ SERPL-MCNC: 0.3 MG/DL (ref 0–0.3)
BILIRUB INDIRECT SERPL-MCNC: 0.8 MG/DL
BILIRUB SERPL-MCNC: 1.1 MG/DL (ref 0–1.2)
BUN SERPL-MCNC: 21 MG/DL (ref 8–23)
BUN/CREAT SERPL: 13 (ref 7–25)
CALCIUM SPEC-SCNC: 8.9 MG/DL (ref 8.6–10.5)
CHLORIDE SERPL-SCNC: 105 MMOL/L (ref 98–107)
CO2 SERPL-SCNC: 24.8 MMOL/L (ref 22–29)
CREAT SERPL-MCNC: 1.62 MG/DL (ref 0.76–1.27)
DEPRECATED RDW RBC AUTO: 45.3 FL (ref 37–54)
EGFRCR SERPLBLD CKD-EPI 2021: 42.1 ML/MIN/1.73
EOSINOPHIL # BLD AUTO: 0.21 10*3/MM3 (ref 0–0.4)
EOSINOPHIL NFR BLD AUTO: 2.8 % (ref 0.3–6.2)
ERYTHROCYTE [DISTWIDTH] IN BLOOD BY AUTOMATED COUNT: 12.1 % (ref 12.3–15.4)
GLUCOSE SERPL-MCNC: 108 MG/DL (ref 65–99)
HCT VFR BLD AUTO: 35.2 % (ref 37.5–51)
HGB BLD-MCNC: 11.8 G/DL (ref 13–17.7)
IMM GRANULOCYTES # BLD AUTO: 0.03 10*3/MM3 (ref 0–0.05)
IMM GRANULOCYTES NFR BLD AUTO: 0.4 % (ref 0–0.5)
LYMPHOCYTES # BLD AUTO: 1.76 10*3/MM3 (ref 0.7–3.1)
LYMPHOCYTES NFR BLD AUTO: 23.3 % (ref 19.6–45.3)
MAGNESIUM SERPL-MCNC: 1.9 MG/DL (ref 1.6–2.4)
MCH RBC QN AUTO: 34.1 PG (ref 26.6–33)
MCHC RBC AUTO-ENTMCNC: 33.5 G/DL (ref 31.5–35.7)
MCV RBC AUTO: 101.7 FL (ref 79–97)
MONOCYTES # BLD AUTO: 0.72 10*3/MM3 (ref 0.1–0.9)
MONOCYTES NFR BLD AUTO: 9.5 % (ref 5–12)
NEUTROPHILS NFR BLD AUTO: 4.79 10*3/MM3 (ref 1.7–7)
NEUTROPHILS NFR BLD AUTO: 63.6 % (ref 42.7–76)
NRBC BLD AUTO-RTO: 0 /100 WBC (ref 0–0.2)
PHOSPHATE SERPL-MCNC: 3.3 MG/DL (ref 2.5–4.5)
PLATELET # BLD AUTO: 184 10*3/MM3 (ref 140–450)
PMV BLD AUTO: 9.8 FL (ref 6–12)
POTASSIUM SERPL-SCNC: 4.1 MMOL/L (ref 3.5–5.2)
PROT SERPL-MCNC: 6 G/DL (ref 6–8.5)
RBC # BLD AUTO: 3.46 10*6/MM3 (ref 4.14–5.8)
SODIUM SERPL-SCNC: 138 MMOL/L (ref 136–145)
WBC NRBC COR # BLD AUTO: 7.54 10*3/MM3 (ref 3.4–10.8)

## 2024-05-25 PROCEDURE — 80076 HEPATIC FUNCTION PANEL: CPT | Performed by: INTERNAL MEDICINE

## 2024-05-25 PROCEDURE — 83735 ASSAY OF MAGNESIUM: CPT | Performed by: INTERNAL MEDICINE

## 2024-05-25 PROCEDURE — 99239 HOSP IP/OBS DSCHRG MGMT >30: CPT | Performed by: FAMILY MEDICINE

## 2024-05-25 PROCEDURE — 84100 ASSAY OF PHOSPHORUS: CPT | Performed by: INTERNAL MEDICINE

## 2024-05-25 PROCEDURE — 80048 BASIC METABOLIC PNL TOTAL CA: CPT | Performed by: INTERNAL MEDICINE

## 2024-05-25 PROCEDURE — 25010000002 HEPARIN (PORCINE) PER 1000 UNITS: Performed by: INTERNAL MEDICINE

## 2024-05-25 PROCEDURE — 25010000002 HYDRALAZINE PER 20 MG: Performed by: FAMILY MEDICINE

## 2024-05-25 PROCEDURE — 85025 COMPLETE CBC W/AUTO DIFF WBC: CPT | Performed by: INTERNAL MEDICINE

## 2024-05-25 RX ORDER — CARVEDILOL 6.25 MG/1
6.25 TABLET ORAL EVERY 12 HOURS SCHEDULED
Qty: 60 TABLET | Refills: 0 | Status: SHIPPED | OUTPATIENT
Start: 2024-05-25 | End: 2024-05-25 | Stop reason: HOSPADM

## 2024-05-25 RX ORDER — HYDRALAZINE HYDROCHLORIDE 20 MG/ML
10 INJECTION INTRAMUSCULAR; INTRAVENOUS ONCE
Status: COMPLETED | OUTPATIENT
Start: 2024-05-25 | End: 2024-05-25

## 2024-05-25 RX ORDER — CARVEDILOL 12.5 MG/1
12.5 TABLET ORAL EVERY 12 HOURS SCHEDULED
Qty: 60 TABLET | Refills: 0 | Status: SHIPPED | OUTPATIENT
Start: 2024-05-25 | End: 2024-06-24

## 2024-05-25 RX ORDER — CLOPIDOGREL BISULFATE 75 MG/1
75 TABLET ORAL DAILY
Qty: 30 TABLET | Refills: 0 | Status: SHIPPED | OUTPATIENT
Start: 2024-05-26 | End: 2024-06-25

## 2024-05-25 RX ORDER — NITROGLYCERIN 40 MG/1
1 PATCH TRANSDERMAL DAILY
Qty: 30 PATCH | Refills: 0 | Status: SHIPPED | OUTPATIENT
Start: 2024-05-26 | End: 2024-06-25

## 2024-05-25 RX ORDER — LOSARTAN POTASSIUM 25 MG/1
25 TABLET ORAL DAILY
Qty: 30 TABLET | Refills: 0 | Status: SHIPPED | OUTPATIENT
Start: 2024-05-25 | End: 2024-06-24

## 2024-05-25 RX ORDER — CARVEDILOL 12.5 MG/1
12.5 TABLET ORAL EVERY 12 HOURS SCHEDULED
Status: DISCONTINUED | OUTPATIENT
Start: 2024-05-25 | End: 2024-05-25 | Stop reason: HOSPADM

## 2024-05-25 RX ADMIN — Medication 10 ML: at 08:05

## 2024-05-25 RX ADMIN — CARVEDILOL 6.25 MG: 6.25 TABLET, FILM COATED ORAL at 08:03

## 2024-05-25 RX ADMIN — CLOPIDOGREL BISULFATE 75 MG: 75 TABLET ORAL at 08:03

## 2024-05-25 RX ADMIN — NITROGLYCERIN 1 PATCH: 0.2 PATCH TRANSDERMAL at 09:09

## 2024-05-25 RX ADMIN — HEPARIN SODIUM 5000 UNITS: 5000 INJECTION INTRAVENOUS; SUBCUTANEOUS at 08:06

## 2024-05-25 RX ADMIN — ASPIRIN 81 MG: 81 TABLET, COATED ORAL at 08:03

## 2024-05-25 RX ADMIN — HYDRALAZINE HYDROCHLORIDE 10 MG: 20 INJECTION, SOLUTION INTRAMUSCULAR; INTRAVENOUS at 05:46

## 2024-05-25 RX ADMIN — CILOSTAZOL 100 MG: 100 TABLET ORAL at 08:03

## 2024-05-25 NOTE — PROGRESS NOTES
[facility]     Cardiology Progress Note    Patient Name: Rudy Moore  : 1940  MRN: [id number]  Primary Care Physician:  Kim Dailey, RUSS  Date of admission: 2024    Subjective   Subjective     Chief Complaint: Admitted with chest pain and elevated troponin    Interval HPI:    Patient otherwise stable still slightly tachycardic with elevated blood pressure    Review of Systems   All systems were reviewed and negative except for: Chest pain    Objective   Objective     Vitals:   Temp:  [97.6 °F (36.4 °C)-98.4 °F (36.9 °C)] 97.9 °F (36.6 °C)  Heart Rate:  [] 95  Resp:  [18-20] 18  BP: ()/() 192/80  Flow (L/min):  [2] 2  Physical Exam      Alert, oriented  Neck: no Bruits. JVD +  Heart Regular, SM III/VI 2nd right ICS  Lungs. Diminished breath sounds.   Abdomen : soft, bs+  LE no edema  Neurologic. No apparent motor deficits.     Scheduled Meds:aspirin, 81 mg, Oral, Daily  atorvastatin, 20 mg, Oral, Nightly  carvedilol, 12.5 mg, Oral, Q12H  cilostazol, 100 mg, Oral, BID  clopidogrel, 75 mg, Oral, Daily  heparin (porcine), 5,000 Units, Subcutaneous, Q12H  nitroglycerin, 1 patch, Transdermal, Daily  sodium chloride, 10 mL, Intravenous, Q12H      Continuous Infusions:        Result Review    Result Review:  I have personally reviewed the results from the time of this admission to 2024 09:51 EDT and agree with these findings:  [x]  Laboratory  []  Microbiology  [x]  Radiology  [x]  EKG/Telemetry   [x]  Cardiology/Vascular   []  Pathology  []  Old records  []  Other:  Most notable findings include:     CBC          2024    12:28 2024    04:12 2024    03:40   CBC   WBC 9.03  8.59  7.54    RBC 3.84  3.46  3.46    Hemoglobin 13.1  11.8  11.8    Hematocrit 38.9  35.0  35.2    .3  101.2  101.7    MCH 34.1  34.1  34.1    MCHC 33.7  33.7  33.5    RDW 12.0  12.1  12.1    Platelets 219  201  184      CMP          2024    12:28 2024    04:12  5/25/2024    03:40   CMP   Glucose 216  107  108    BUN 31  25  21    Creatinine 2.17  1.75  1.62    EGFR 29.7  38.4  42.1    Sodium 140  139  138    Potassium 4.7  4.0  4.1    Chloride 101  104  105    Calcium 9.3  8.9  8.9    Total Protein 6.4  5.9  6.0    Albumin 3.8  3.6  3.6    Globulin 2.6  2.3     Total Bilirubin 0.9  0.7  1.1    Alkaline Phosphatase 76  70  71    AST (SGOT) 29  39  27    ALT (SGPT) 20  17  16    Albumin/Globulin Ratio 1.5  1.6     BUN/Creatinine Ratio 14.3  14.3  13.0    Anion Gap 11.6  9.6  8.2       CARDIAC LABS:      Lab 05/24/24  0412 05/23/24  1432 05/23/24  1228   PROBNP  --   --  1,088.0   HSTROP T 365* 306* 200*   PROTIME  --   --  14.0   INR  --   --  1.06        Assessment & Plan   Assessment / Plan     Brief Patient Summary:  Rudy Moore is a 83 y.o. male with history of chest pain.  Patient reported chest pains mostly at night when he is in bed.  Pain is not triggered by exertion.  He had a coronary angiography yesterday which showed 100% proximal occlusion of the right coronary artery which have previous stents.  Left anterior descending coronary have mild disease.  Left circumflex artery have mild disease.  There are collateral flow from the left coronary artery to the right coronary artery via septals are good feeling the right coronary artery all the way down to the proximal segment.  He also have calcific aortic valve stenosis with a dimensionless index of 0.25 suggestive of severe aortic stenosis..    Active Hospital Problems:  Active Hospital Problems    Diagnosis     **NSTEMI (non-ST elevated myocardial infarction)     Coronary artery disease involving coronary bypass graft of native heart with unstable angina pectoris     Nonrheumatic aortic valve stenosis     Uncontrolled hypertension            Plan:     I would optimize medical therapy with blood pressure control.  Will increase the carvedilol to 12.5 mg twice daily and hopefully up to 25 twice a day.   Continue with vasodilators statin therapy.  Agree with dual antiplatelet therapy.  The patient will need an outpatient evaluation for consideration of SAVR-CABG to RCA versus TAVR- PCI of RCA.    I spent over 25 minutes of time during his evaluation including 50% of time coordination of care           CODE STATUS:   Level Of Support Discussed With: Patient  Code Status (Patient has no pulse and is not breathing): CPR (Attempt to Resuscitate)  Medical Interventions (Patient has pulse or is breathing): Full Support      Electronically signed by Fran Akbar MD, 05/25/24, 9:47 AM EDT.

## 2024-05-25 NOTE — PLAN OF CARE
Goal Outcome Evaluation:  Plan of Care Reviewed With: patient, spouse        Progress: improving  Outcome Evaluation: Educated on D/C and follow up appointments also.

## 2024-05-25 NOTE — DISCHARGE SUMMARY
[facility]         HOSPITALIST  DISCHARGE SUMMARY    Patient Name: Rudy Moore  : 1940  MRN: [id number]    Date of Admission: 2024  Date of Discharge:  2024    Primary Care Physician: Kim Dailey APRN    Consults       Date and Time Order Name Status Description    2024  7:30 PM Inpatient Cardiology Consult      2024  3:15 PM Hospitalist (on-call MD unless specified)      2024  2:21 PM Cardiology (on-call MD unless specified) Completed             Active and Resolved Hospital Problems:    NSTEMI  Acute kidney injury with baseline creatinine 1.5-1.8 currently 2.17  Moderate to moderately severe aortic stenosis  Hypertension  Peripheral vascular disease    Active Hospital Problems    Diagnosis POA   • **NSTEMI (non-ST elevated myocardial infarction) [I21.4] Yes   • Coronary artery disease involving coronary bypass graft of native heart with unstable angina pectoris [I25.700] Unknown   • Nonrheumatic aortic valve stenosis [I35.0] Yes   • Uncontrolled hypertension [I10] Yes      Resolved Hospital Problems   No resolved problems to display.       Hospital Course     Hospital Course:  83-year-old male with CKD stage IIIb, CAD, diabetes, hypertension, PVD, carotid artery disease, moderate severe aortic stenosis, underlying history of CABG, vascular surgeries involving the lower extremity, hospitalized on 2020 for chief complaint of chest pain, diagnosed with NSTEMI, cardiology consulted, planned cardiac cath, no new findings, patent stent proximal left circumflex with 30 to 40% in-stent restenosis, nonobstructive and borderline lesions in the proximal and mid left circumflex artery, with 100% chronic total occlusion of right coronary inserted previously placed stent with left to right collateral filling mid to distal right coronary artery.  Proceed aggressive medical management with dual antiplatelet therapy and antianginal medications.  Coreg  dose increased to 12.5 mg twice a day, Nitropatch, Plavix resumed, initiated on losartan.  Uptitrate as outpatient.  Referred to cardiac rehab.  To see cardiology in 2 weeks.  Cardiology to arrange  outpatient evaluation for consideration of SAVR-CABG to RCA versus TAVR- PCI of RCA.  Discharged in hemodynamic stable addition on 5/25/2024.          Day of Discharge     Vital Signs:  Temp:  [97.6 °F (36.4 °C)-98.4 °F (36.9 °C)] 97.9 °F (36.6 °C)  Heart Rate:  [] 95  Resp:  [18-20] 18  BP: ()/() 192/80  Flow (L/min):  [2] 2  Review of systems:  All systems reviewed negative except for fatigue    Physical Exam                         Constitutional: Awake, alert, no acute distress              Eyes: Pupils equal, sclerae anicteric, no conjunctival injection              HENT: NCAT, mucous membranes moist              Neck: Supple, no thyromegaly, no lymphadenopathy, trachea midline              Respiratory: Clear to auscultation bilaterally, nonlabored respirations               Cardiovascular: RRR, 3 out of 6 systolic ejection murmur at the right upper sternal border, rubs, or gallops, palpable pedal pulses bilaterally              Gastrointestinal: Positive bowel sounds, soft, nontender, nondistended              Musculoskeletal: No bilateral ankle edema, no clubbing or cyanosis to extremities              Psychiatric: Appropriate affect, cooperative              Neurologic: Oriented x 3, strength symmetric in all extremities, Cranial Nerves grossly intact to confrontation, speech clear              Skin: No rashes       Discharge Details        Discharge Medications        New Medications        Instructions Start Date   carvedilol 12.5 MG tablet  Commonly known as: COREG   12.5 mg, Oral, Every 12 Hours Scheduled      clopidogrel 75 MG tablet  Commonly known as: PLAVIX   75 mg, Oral, Daily   Start Date: May 26, 2024     losartan 25 MG tablet  Commonly known as: Cozaar   25 mg, Oral, Daily       nitroglycerin 0.2 MG/HR patch  Commonly known as: NITRODUR   1 patch, Transdermal, Daily   Start Date: May 26, 2024            Continue These Medications        Instructions Start Date   aspirin 81 MG EC tablet   81 mg, Oral, Daily      atorvastatin 20 MG tablet  Commonly known as: LIPITOR   20 mg, Oral, Nightly      cilostazol 100 MG tablet  Commonly known as: PLETAL   100 mg, Oral, 2 Times Daily      multivitamin with minerals tablet tablet   1 tablet, Oral, Daily      traMADol 50 MG tablet  Commonly known as: ULTRAM   100 mg, Oral, 2 Times Daily PRN      True Metrix Blood Glucose Test test strip  Generic drug: glucose blood   1 each, Other, Daily, Use as instructed             Stop These Medications      isosorbide mononitrate 30 MG 24 hr tablet  Commonly known as: IMDUR     lisinopril 40 MG tablet  Commonly known as: PRINIVIL,ZESTRIL     metoprolol tartrate 50 MG tablet  Commonly known as: LOPRESSOR              No Known Allergies    Discharge Disposition:  Home or Self Care    Diet:  Hospital:  Diet Order   Procedures   • Diet: Cardiac, Diabetic; Healthy Heart (2-3 Na+); Consistent Carbohydrate; Fluid Consistency: Thin (IDDSI 0)       Discharge Activity: as tolerates      CODE STATUS:  Code Status and Medical Interventions:   Ordered at: 05/23/24 1643     Level Of Support Discussed With:    Patient     Code Status (Patient has no pulse and is not breathing):    CPR (Attempt to Resuscitate)     Medical Interventions (Patient has pulse or is breathing):    Full Support         Future Appointments   Date Time Provider Department Center   6/5/2024 10:30 AM Kim Dailey APRN [facility] [place] [facility]       Additional Instructions for the Follow-ups that You Need to Schedule       Ambulatory Referral to Cardiac Rehab   As directed      Discharge Follow-up with PCP   As directed       Currently Documented PCP:    Kim Dailey APRN    PCP Phone Number:    [phone]     Follow Up Details: 3 to 7 days                 Pertinent  and/or Most Recent Results     PROCEDURES:   Cardiac Catheterization/Vascular Study    Result Date: 2024  [facility] CARDIAC CATHETERIZATION PROCEDURE REPORT Patient: Rudy Moore : 1940 MRN: [id number] Procedure Date: 24 Referring Physician: Fabiano Randolph MD Interventional Cardiologist: Cm Ryan MD Indication: Non-STEMI Known coronary artery disease, previous multivessel angioplasty Clinical Presentation: Mr. Moore underwent multivessel angioplasty stent placement 20 years back.  Recently, he is experiencing chest pain with activity.  He had a SPECT stress test done last year which showed inferior wall infarct with mild jailene-infarct ischemia.  Medical management was recommended.  He came to the hospital yesterday because of an episode of chest pain.  He was started on heparin infusion.  Cardiac markers including troponins were mildly elevated.  Today, he was brought to the Cath Lab to identify ischemic culprits Procedure performed: Left heart catheterization Selective coronary angiography Access Sites: Right radial artery Right common femoral artery Findings: 1. Coronary Artery Anatomy: Dominance: Right Left Main: Normal with no stenosis. Left Anterior Descending artery: Medium caliber vessel giving rise to various diagonal and septal  branches.  Entire left and descending artery and branches are free of any stenosis other than luminal irregularities. Left circumflex Artery: Medium caliber, nondominant vessel.  Very proximal left circumflex artery has an eccentric lesion with some calcification.  The lesion is angiographically 50 to 60% severity in 1 view.  A stent is visible in proximal left circumflex artery with 30 to 40% in-stent restenosis.  OM 1 branch is a smaller vessel with no major lesions.  There is another lesion in the mid left circumflex artery which is angiographically 50 to 60% severity.  Terminal OM branches free  of any stenosis. Right Coronary Artery: Right coronary artery is 100% occluded in its ostium.  It is a chronic total occlusion.  A stent is visible in the ostial proximal RCA and also in the distal RCA.  Good retrograde collateral filling of right coronary artery is noted on injection of the left coronary system.  There are collaterals, mostly from LAD artery and septal perforators and fills retrogradely up to the proximal stent. 2. Hemodynamics: The opening aortic pressure is 170/68 with a mean of 106 mmHg. The aortic valve was not crossed during the study. 3. Left Ventriculogram: Not performed Conclusions: 100% chronic total occlusion of right coronary artery, inserted previously placed stent with left-to-right collateral filling of mid to distal right coronary artery. Patent stent in proximal left circumflex artery with 30 to 40% in-stent restenosis.  Nonobstructive and borderline lesions noted in proximal and mid left circumflex artery. Recommendations: Continue medical management, will initiate dual antiplatelet therapy and maximize antianginal medications. Continue gentle IV fluid hydration for the rest of the day due to chronic kidney disease Procedure Details: Informed consent was obtained with an explanation of the risks, benefits and alternatives of the procedure. The patient was brought to the Cardiac Catheterization Laboratory and was prepped and draped in a standard sterile fashion. Moderate sedation with Fentanyl and Versed was administered by the circulating nurse. Lidocaine 2% was used to anesthetize the right radial artery and a 5/6 Slender sheath was placed.  We could not advance any wire beyond the elbow region.  An angled glide wire was curling around.  A limited angiogram of the right radial artery was performed which showed a 360 degree loop of the proximal radial artery going into the brachial.  We are unable to navigate wire into the brachial.  At this time the sheath was taken out and TR  band was applied for hemostasis of the radial arteriotomy site. Next, right inguinal region was prepared, cleaned and draped in usual sterile fashion.  2% lidocaine was used to anesthetize the area.  Under ultrasound guidance, right common femoral artery was cannulated and a 5 Andorran arterial sheath was advanced by modified Seldinger technique.  A 4 Andorran JR4 catheter was used to engage the ostium of right coronary artery..  4 Andorran JL 4 catheter was used to engage the ostium of left main coronary artery.  Diagnostic angiogram was performed with injection of nonionic contrast in all appropriate projections.  Aortic valve was not crossed during the study.  All the catheters images were done over the long exchange length wire.  At the end of the procedure, the likely sheath was pulled and TR band was applied for hemostasis.  Patient tolerated procedure well without any complications.  The results of the test were explained in detail with the patient and family members. Cumulative fluoroscopy time: 8 min Cumulative air kerma: 525 mGy Total amount of contrast used: 47 ml of Isovue Complications: None. Estimated Blood Loss: Minimal. Cm Ryan MD 05/24/24 11:41 EDT     XR Chest 1 View    Result Date: 5/23/2024  XR CHEST 1 VW-  Date of Exam: 5/23/2024 12:34 PM  Indication: Chest Pain Triage Protocol.  Comparison Exams: August 29, 2023  Technique: Single AP chest radiograph  FINDINGS: The lungs are clear. The heart and mediastinal contours appear normal. The pulmonary vasculature appears normal. The osseous structures appear intact.      No acute cardiopulmonary process identified.   Electronically Signed By-Miki Madison MD On:5/23/2024 12:41 PM      Adult Transthoracic Echo Complete W/ Cont if Necessary Per Protocol    Result Date: 5/6/2024  Normal left ventricular systolic function. Fibrocalcific aortic valve. Moderate to moderately severe aortic stenosis with a mean gradient of 24 mm.  No change from previous  echo. Trace mitral regurgitation.  ]    LAB RESULTS:      Lab 05/25/24  0340 05/24/24 0412 05/23/24  2110 05/23/24  1228   WBC 7.54 8.59  --  9.03   HEMOGLOBIN 11.8* 11.8*  --  13.1   HEMATOCRIT 35.2* 35.0*  --  38.9   PLATELETS 184 201  --  219   NEUTROS ABS 4.79 5.41  --  7.26*   IMMATURE GRANS (ABS) 0.03 0.03  --  0.03   LYMPHS ABS 1.76 2.17  --  1.12   MONOS ABS 0.72 0.68  --  0.57   EOS ABS 0.21 0.26  --  0.02   .7* 101.2*  --  101.3*   PROTIME  --   --   --  14.0   APTT  --  54.4* 124.4* 23.8*         Lab 05/25/24 0340 05/24/24 0412 05/23/24  1228   SODIUM 138 139 140   POTASSIUM 4.1 4.0 4.7   CHLORIDE 105 104 101   CO2 24.8 25.4 27.4   ANION GAP 8.2 9.6 11.6   BUN 21 25* 31*   CREATININE 1.62* 1.75* 2.17*   EGFR 42.1* 38.4* 29.7*   GLUCOSE 108* 107* 216*   CALCIUM 8.9 8.9 9.3   MAGNESIUM 1.9 1.8 1.9   PHOSPHORUS 3.3  --   --          Lab 05/25/24  0340 05/24/24 0412 05/23/24  1228   TOTAL PROTEIN 6.0 5.9* 6.4   ALBUMIN 3.6 3.6 3.8   GLOBULIN  --  2.3 2.6   ALT (SGPT) 16 17 20   AST (SGOT) 27 39 29   BILIRUBIN 1.1 0.7 0.9   INDIRECT BILIRUBIN 0.8  --   --    BILIRUBIN DIRECT 0.3  --   --    ALK PHOS 71 70 76   LIPASE  --   --  30         Lab 05/24/24  0412 05/23/24  1432 05/23/24  1228   PROBNP  --   --  1,088.0   HSTROP T 365* 306* 200*   PROTIME  --   --  14.0   INR  --   --  1.06                 Brief Urine Lab Results  (Last result in the past 365 days)        Color   Clarity   Blood   Leuk Est   Nitrite   Protein   CREAT   Urine HCG        02/26/24 1020             156.1               Microbiology Results (last 10 days)       ** No results found for the last 240 hours. **            XR Chest 1 View    Result Date: 5/23/2024  Impression: No acute cardiopulmonary process identified.   Electronically Signed By-Miki Madison MD On:5/23/2024 12:41 PM       Results for orders placed during the hospital encounter of 05/03/22    Duplex Carotid Ultrasound CAR    Interpretation Summary  · Proximal  right internal carotid artery plaque without significant stenosis.  · Proximal left internal carotid artery plaque without significant stenosis.  · Minimal to mild plaque in the right carotid bifurcation and mild to moderate on the left  · Vertebral flow is antegrade bilaterally.  · No significant change in comparison to study dated 8/25/2020.      Results for orders placed during the hospital encounter of 05/03/22    Duplex Carotid Ultrasound CAR    Interpretation Summary  · Proximal right internal carotid artery plaque without significant stenosis.  · Proximal left internal carotid artery plaque without significant stenosis.  · Minimal to mild plaque in the right carotid bifurcation and mild to moderate on the left  · Vertebral flow is antegrade bilaterally.  · No significant change in comparison to study dated 8/25/2020.      Results for orders placed during the hospital encounter of 05/06/24    Adult Transthoracic Echo Complete W/ Cont if Necessary Per Protocol    Interpretation Summary  Normal left ventricular systolic function.  Fibrocalcific aortic valve.  Moderate to moderately severe aortic stenosis with a mean gradient of 24 mm.  No change from previous echo.  Trace mitral regurgitation.      Labs Pending at Discharge:        Time spent on Discharge including face to face service:  35 minutes    Electronically signed by Giuseppe Wilcox MD, 05/25/24, 10:42 AM EDT.    Portions of this documentation were transcribed electronically from a voice recognition software.  I confirm all data accurately represents the service(s) I performed at today's visit.

## 2024-05-25 NOTE — OUTREACH NOTE
Prep Survey      Flowsheet Row Responses   [facility] patient discharged from? [facility]   Is LACE score < 7 ? No   Eligibility [facility]   Date of Admission 05/23/24   Date of Discharge 05/25/24   Discharge Disposition Home or Self Care   Discharge diagnosis NSTEMI, heart cath - previous stents patent   Does the patient have one of the following disease processes/diagnoses(primary or secondary)? Acute MI (STEMI,NSTEMI)   Does the patient have Home health ordered? No   Is there a DME ordered? No   Prep survey completed? Yes            Mikki GAGNON - Registered Nurse

## 2024-05-28 ENCOUNTER — TRANSITIONAL CARE MANAGEMENT TELEPHONE ENCOUNTER (OUTPATIENT)
Dept: CALL CENTER | Facility: HOSPITAL | Age: 84
End: 2024-05-28
Payer: MEDICARE

## 2024-05-28 LAB
QT INTERVAL: 397 MS
QT INTERVAL: 401 MS
QTC INTERVAL: 421 MS
QTC INTERVAL: 426 MS

## 2024-05-28 NOTE — OUTREACH NOTE
Call Center TCM Note      Flowsheet Row Responses   [facility] patient discharged from? [facility]   Does the patient have one of the following disease processes/diagnoses(primary or secondary)? Acute MI (STEMI,NSTEMI)   TCM attempt successful? Yes   Call start time 0852   Call end time 0856   Discharge diagnosis NSTEMI, heart cath - previous stents patent   Person spoke with today (if not patient) and relationship Angelika   Meds reviewed with patient/caregiver? Yes   Is the patient having any side effects they believe may be caused by any medication additions or changes? No   Does the patient have all prescriptions related to this admission filled (includes statins,anticoagulants,HTN meds,anti-arrhythmia meds) Yes   Is the patient taking all medications as directed (includes completed medication regime)? Yes   Comments PCP appt  listed as 6/5/24 1030 am. This appt is not listed as HOSP DC FU appt. It does meet TCM guidelines.   Does the patient have an appointment with their PCP within 7-14 days of discharge? Yes   Has home health visited the patient within 72 hours of discharge? N/A   Psychosocial issues? No   Did the patient receive a copy of their discharge instructions? Yes   Nursing interventions Reviewed instructions with patient   What is the patient's perception of their health status since discharge? Improving   Nursing interventions Nurse provided patient education   Is the patient/caregiver able to teach back signs and symptoms of when to call for help immediately: Sudden chest discomfort, Sudden discomfort in arms, back, neck or jaw, Shortness of breath at any time, Sudden sweating or clammy skin, Nausea or vomiting, Dizziness or lightheadedness, Irregular or rapid heart rate   Nursing interventions Nurse provided patient education   Is the patient/caregiver able to teach back lifestyle changes to help prevent MIs Heart healthy diet, Reducing stress   Is the patient/caregiver able to teach back ways to  prevent a second heart attack: Take medications, Follow up with MD   If the patient is a current smoker, are they able to teach back resources for cessation? Not a smoker   Is the patient/caregiver able to teach back the hierarchy of who to call/visit for symptoms/problems? PCP, Specialist, Home health nurse, Urgent Care, ED, 911 Yes   TCM call completed? Yes   Wrap up additional comments Wife reports Pt is doing better. Still a little weak. No issues with cath site. Cardio appt inplace.   Call end time 0856            Priscilla Dawkins RN    5/28/2024, 08:56 EDT

## 2024-06-03 ENCOUNTER — READMISSION MANAGEMENT (OUTPATIENT)
Dept: CALL CENTER | Facility: HOSPITAL | Age: 84
End: 2024-06-03
Payer: MEDICARE

## 2024-06-03 NOTE — OUTREACH NOTE
AMI Week 2 Survey      Flowsheet Row Responses   [facility] patient discharged from? [facility]   Does the patient have one of the following disease processes/diagnoses(primary or secondary)? Acute MI (STEMI,NSTEMI)   Week 2 attempt successful? Yes   Call start time 1432   Call end time 1443   Discharge diagnosis NSTEMI, heart cath - previous stents patent   Is patient permission given to speak with other caregiver? Yes   Person spoke with today (if not patient) and relationship Angelika   Comments Pt remains tired/easily fatigued and SOA, per wife. Per wife the pt has denied chest pain. Right groin and R. radial site has healed well. Wife reports no further needs.   What is the patient's perception of their health status since discharge? Improving   Nursing interventions Nurse provided patient education   Is the patient/caregiver able to teach back signs and symptoms of when to call for help immediately: Sudden chest discomfort, Sudden discomfort in arms, back, neck or jaw, Sudden sweating or clammy skin, Nausea or vomiting   Is the patient/caregiver able to teach back ways to prevent a second heart attack: Follow up with MD   Week 2 call completed? Yes   Revoked No further contact(revokes)-requires comment   Call end time 1443            Viki PABLO - Registered Nurse

## 2024-06-05 ENCOUNTER — OFFICE VISIT (OUTPATIENT)
Dept: FAMILY MEDICINE CLINIC | Facility: CLINIC | Age: 84
End: 2024-06-05
Payer: MEDICARE

## 2024-06-05 VITALS
SYSTOLIC BLOOD PRESSURE: 118 MMHG | OXYGEN SATURATION: 91 % | TEMPERATURE: 97.3 F | HEART RATE: 76 BPM | WEIGHT: 203.5 LBS | BODY MASS INDEX: 30.14 KG/M2 | DIASTOLIC BLOOD PRESSURE: 64 MMHG | HEIGHT: 69 IN

## 2024-06-05 DIAGNOSIS — Z79.899 CONTROLLED SUBSTANCE AGREEMENT SIGNED: ICD-10-CM

## 2024-06-05 DIAGNOSIS — Z09 HOSPITAL DISCHARGE FOLLOW-UP: Primary | ICD-10-CM

## 2024-06-05 DIAGNOSIS — M79.18 MUSCULOSKELETAL PAIN: ICD-10-CM

## 2024-06-05 DIAGNOSIS — R53.82 CHRONIC FATIGUE: ICD-10-CM

## 2024-06-05 DIAGNOSIS — N18.31 STAGE 3A CHRONIC KIDNEY DISEASE: ICD-10-CM

## 2024-06-05 DIAGNOSIS — M48.10 DISH (DIFFUSE IDIOPATHIC SKELETAL HYPEROSTOSIS): ICD-10-CM

## 2024-06-05 DIAGNOSIS — D64.9 ANEMIA, UNSPECIFIED TYPE: ICD-10-CM

## 2024-06-05 DIAGNOSIS — Z79.899 HIGH RISK MEDICATION USE: ICD-10-CM

## 2024-06-05 DIAGNOSIS — I21.4 NSTEMI (NON-ST ELEVATED MYOCARDIAL INFARCTION): ICD-10-CM

## 2024-06-05 LAB
AMPHET+METHAMPHET UR QL: NEGATIVE
ANION GAP SERPL CALCULATED.3IONS-SCNC: 8 MMOL/L (ref 5–15)
BARBITURATES UR QL SCN: NEGATIVE
BASOPHILS # BLD AUTO: 0.03 10*3/MM3 (ref 0–0.2)
BASOPHILS NFR BLD AUTO: 0.4 % (ref 0–1.5)
BENZODIAZ UR QL SCN: NEGATIVE
BUN SERPL-MCNC: 24 MG/DL (ref 8–23)
BUN/CREAT SERPL: 12.8 (ref 7–25)
CALCIUM SPEC-SCNC: 9.9 MG/DL (ref 8.6–10.5)
CANNABINOIDS SERPL QL: NEGATIVE
CHLORIDE SERPL-SCNC: 99 MMOL/L (ref 98–107)
CO2 SERPL-SCNC: 32 MMOL/L (ref 22–29)
COCAINE UR QL: NEGATIVE
CREAT SERPL-MCNC: 1.87 MG/DL (ref 0.76–1.27)
DEPRECATED RDW RBC AUTO: 44 FL (ref 37–54)
EGFRCR SERPLBLD CKD-EPI 2021: 35.5 ML/MIN/1.73
EOSINOPHIL # BLD AUTO: 0.24 10*3/MM3 (ref 0–0.4)
EOSINOPHIL NFR BLD AUTO: 3.6 % (ref 0.3–6.2)
ERYTHROCYTE [DISTWIDTH] IN BLOOD BY AUTOMATED COUNT: 11.8 % (ref 12.3–15.4)
FENTANYL UR-MCNC: NEGATIVE NG/ML
FERRITIN SERPL-MCNC: 358 NG/ML (ref 30–400)
GLUCOSE SERPL-MCNC: 125 MG/DL (ref 65–99)
HCT VFR BLD AUTO: 37.4 % (ref 37.5–51)
HGB BLD-MCNC: 12.6 G/DL (ref 13–17.7)
IMM GRANULOCYTES # BLD AUTO: 0.06 10*3/MM3 (ref 0–0.05)
IMM GRANULOCYTES NFR BLD AUTO: 0.9 % (ref 0–0.5)
IRON 24H UR-MRATE: 62 MCG/DL (ref 59–158)
IRON SATN MFR SERPL: 23 % (ref 20–50)
LYMPHOCYTES # BLD AUTO: 1.87 10*3/MM3 (ref 0.7–3.1)
LYMPHOCYTES NFR BLD AUTO: 27.7 % (ref 19.6–45.3)
MCH RBC QN AUTO: 34.1 PG (ref 26.6–33)
MCHC RBC AUTO-ENTMCNC: 33.7 G/DL (ref 31.5–35.7)
MCV RBC AUTO: 101.4 FL (ref 79–97)
METHADONE UR QL SCN: NEGATIVE
MONOCYTES # BLD AUTO: 0.5 10*3/MM3 (ref 0.1–0.9)
MONOCYTES NFR BLD AUTO: 7.4 % (ref 5–12)
NEUTROPHILS NFR BLD AUTO: 4.04 10*3/MM3 (ref 1.7–7)
NEUTROPHILS NFR BLD AUTO: 60 % (ref 42.7–76)
NRBC BLD AUTO-RTO: 0 /100 WBC (ref 0–0.2)
OPIATES UR QL: NEGATIVE
OXYCODONE UR QL SCN: NEGATIVE
PLATELET # BLD AUTO: 303 10*3/MM3 (ref 140–450)
PMV BLD AUTO: 9.5 FL (ref 6–12)
POTASSIUM SERPL-SCNC: 5.1 MMOL/L (ref 3.5–5.2)
RBC # BLD AUTO: 3.69 10*6/MM3 (ref 4.14–5.8)
SODIUM SERPL-SCNC: 139 MMOL/L (ref 136–145)
TIBC SERPL-MCNC: 274 MCG/DL (ref 298–536)
TRANSFERRIN SERPL-MCNC: 184 MG/DL (ref 200–360)
WBC NRBC COR # BLD AUTO: 6.74 10*3/MM3 (ref 3.4–10.8)

## 2024-06-05 PROCEDURE — 80307 DRUG TEST PRSMV CHEM ANLYZR: CPT | Performed by: NURSE PRACTITIONER

## 2024-06-05 PROCEDURE — 99495 TRANSJ CARE MGMT MOD F2F 14D: CPT | Performed by: NURSE PRACTITIONER

## 2024-06-05 PROCEDURE — 1125F AMNT PAIN NOTED PAIN PRSNT: CPT | Performed by: NURSE PRACTITIONER

## 2024-06-05 PROCEDURE — 82728 ASSAY OF FERRITIN: CPT | Performed by: NURSE PRACTITIONER

## 2024-06-05 PROCEDURE — 80048 BASIC METABOLIC PNL TOTAL CA: CPT | Performed by: NURSE PRACTITIONER

## 2024-06-05 PROCEDURE — 84466 ASSAY OF TRANSFERRIN: CPT | Performed by: NURSE PRACTITIONER

## 2024-06-05 PROCEDURE — 3074F SYST BP LT 130 MM HG: CPT | Performed by: NURSE PRACTITIONER

## 2024-06-05 PROCEDURE — 83540 ASSAY OF IRON: CPT | Performed by: NURSE PRACTITIONER

## 2024-06-05 PROCEDURE — 85025 COMPLETE CBC W/AUTO DIFF WBC: CPT | Performed by: NURSE PRACTITIONER

## 2024-06-05 PROCEDURE — 1111F DSCHRG MED/CURRENT MED MERGE: CPT | Performed by: NURSE PRACTITIONER

## 2024-06-05 PROCEDURE — 3078F DIAST BP <80 MM HG: CPT | Performed by: NURSE PRACTITIONER

## 2024-06-05 RX ORDER — CARVEDILOL 6.25 MG/1
6.25 TABLET ORAL 2 TIMES DAILY
COMMUNITY

## 2024-06-05 RX ORDER — TRAMADOL HYDROCHLORIDE 50 MG/1
100 TABLET ORAL 2 TIMES DAILY PRN
Qty: 120 TABLET | Refills: 2 | Status: SHIPPED | OUTPATIENT
Start: 2024-06-05

## 2024-06-05 NOTE — PROGRESS NOTES
..  Venipuncture Blood Specimen Collection  Venipuncture performed in LT arm by Lillie Tinsley with good hemostasis. Patient tolerated the procedure well without complications.   06/05/24   Jo Kingsley MA

## 2024-06-05 NOTE — PROGRESS NOTES
Transitional Care Follow Up Visit  Subjective     Rudy is a 83 y.o. male who presents for a transitional care management visit.    Within 48 business hours after discharge our office contacted him via telephone to coordinate his care and needs.     Transitional Care Management Telephone Encounter with Priscilla Dawkins RN (05/28/2024)      I reviewed and discussed the details of that call along with the discharge summary, hospital problems, inpatient lab results, inpatient diagnostic studies, and consultation reports with Rudy.     Current outpatient and discharge medications have been reconciled for the patient.  Reviewed by: RUSS Herrera          5/25/2024    11:10 AM   Date of TCM Phone Call   [facility]   Date of Admission 5/23/2024   Date of Discharge 5/25/2024   Discharge Disposition Home or Self Care       Risk for Readmission (LACE) Score: 9 (5/25/2024  6:00 AM)      History of Present Illness     Course During Hospital Stay The following information was reviewed by: RUSS Herrera on 06/05/2024:     He was hospitalized on 5/23/2024 and discharged on 5/25/2024 secondary to non-STEMI.  He presented to the ED with complaints of chest pain. He was sent to the ED by Dr. Randolph. He states he had been having chest pain off and on at night for quite a while, usually after laying down. He states every once in a while he would have strong chest pain upon laying down, but then it would fade out. The night before he had the appointment with Dr. Randolph he went to bed with chest pain, then woke up with it. He did not feel well per his wife. He went to his appointment and told Dr. Randolph and he sent him to the ED.     He was diagnosed with non-STEMI and cardiology was consulted.  They did a cardiac cath with no new findings.  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  Recommendations included aggressive medical management with dual antiplatelet therapy and antianginal medications.  Per hospital discharge summary his carvedilol was increased to 12.5 mg twice daily.  Nitropatch was added.  Plavix was resumed, and he was initiated on losartan.  He was referred to cardiac rehab (but states he has not heard about this) and advised to see cardiology 2 weeks status post discharge.  Cardiology is to arrange for outpatient evaluation for consideration of SAVR-CABG to RCA versus TAVR- PCI of RCA.      His wife states that she and his daughter were told that two of the stents were working and another was 100% blocked.     He has an appointment with Dr. Randolph on 6/11/2024.    He is no longer having chest pain, but he does feel tired all of the time. He states that since they changed his medicine he has not had any pain. His wife states he will walk down the hill to check on his tomatoes, but he has not been able to mow the grass. His wife has been mowing and she got stuck under the fence. He endorses exertional shortness of breath. Overall, he does feel better than when he went into the hospital.     He is also needing a refill of his pain medication. He is prescribed Tramadol 100mg BID for chronic pain related to DISH. He has stage IIIa chronic kidney disease and cannot take NSAIDs any longer. He will sometimes only take it once per day, but his wife states he takes it BID most days.     PEG: A Three-Item Scale Assessing Pain Intensity and Interference  0 being no pain 10 pain as bad as you can imagine  What number best describes your pain on average in the past week? 0   2.  What number best describes how, during the past week, pain has interfered with your enjoyment of life? 0  3.   "What number best describes how, during the past week, pain has interfered with your general activity? 0 - pain has not interfered, only fatigue.        The following portions of the patient's history were reviewed and updated as appropriate: allergies, current medications, past family history, past medical history, past social history, past surgical history, and problem list.     Vitals:    06/05/24 0939   BP: 118/64   BP Location: Left arm   Patient Position: Sitting   Pulse: 76   Temp: 97.3 °F (36.3 °C)   TempSrc: Temporal   SpO2: 91%   Weight: 92.3 kg (203 lb 8 oz)   Height: 175.3 cm (69\")       Review of Systems    Objective   Physical Exam  Vitals reviewed.   Constitutional:       General: He is not in acute distress.     Appearance: He is well-developed. He is obese.   HENT:      Head: Normocephalic and atraumatic.   Eyes:      General: No scleral icterus.        Right eye: No discharge.         Left eye: No discharge.      Extraocular Movements: Extraocular movements intact.      Conjunctiva/sclera: Conjunctivae normal.   Neck:      Trachea: Trachea normal.   Cardiovascular:      Rate and Rhythm: Normal rate and regular rhythm.      Pulses: Normal pulses.      Heart sounds: Murmur heard.      Systolic murmur is present with a grade of 3/6.   Pulmonary:      Effort: Pulmonary effort is normal.      Breath sounds: Normal breath sounds. No wheezing, rhonchi or rales.   Musculoskeletal:         General: Normal range of motion.      Cervical back: Normal range of motion and neck supple. No tenderness.      Right lower leg: No edema.      Left lower leg: No edema.   Lymphadenopathy:      Cervical: No cervical adenopathy.   Skin:     General: Skin is warm and dry.   Neurological:      Mental Status: He is alert and oriented to person, place, and time.   Psychiatric:         Mood and Affect: Mood and affect normal.         Behavior: Behavior normal.         Thought Content: Thought content normal.         Judgment: " Judgment normal.         Result Review :  The following data was reviewed by: RUSS Herrera on 06/05/2024:    ED to Hosp-Admission (Discharged) with Giuseppe Wilcox MD; John Etienne DO (05/23/2024)   Office Visit with Fabiano Randolph MD (05/23/2024)     Common labs          5/23/2024    12:28 5/24/2024    04:12 5/25/2024    03:40   Common Labs   Glucose 216  107  108    BUN 31  25  21    Creatinine 2.17  1.75  1.62    Sodium 140  139  138    Potassium 4.7  4.0  4.1    Chloride 101  104  105    Calcium 9.3  8.9  8.9    Albumin 3.8  3.6  3.6    Total Bilirubin 0.9  0.7  1.1    Alkaline Phosphatase 76  70  71    AST (SGOT) 29  39  27    ALT (SGPT) 20  17  16    WBC 9.03  8.59  7.54    Hemoglobin 13.1  11.8  11.8    Hematocrit 38.9  35.0  35.2    Platelets 219  201  184      Basic metabolic panel (03/06/2024 10:21)  Vitamin B12 & Folate (03/06/2024 10:21)    High Sensitivity Troponin T (05/23/2024 12:28)   High Sensitivity Troponin T 2Hr (05/23/2024 14:32)   High Sensitivity Troponin T (05/24/2024 04:12)     XR Chest 1 View (05/23/2024 12:34)     Cardiac Catheterization/Vascular Study (05/24/2024 11:09)     Assessment & Plan     Diagnoses and all orders for this visit:    1. Hospital discharge follow-up (Primary)    2. NSTEMI (non-ST elevated myocardial infarction)    3. DISH (diffuse idiopathic skeletal hyperostosis)  -     traMADol (ULTRAM) 50 MG tablet; Take 2 tablets by mouth 2 (Two) Times a Day As Needed for Moderate Pain.  Dispense: 120 tablet; Refill: 2    4. Musculoskeletal pain  -     traMADol (ULTRAM) 50 MG tablet; Take 2 tablets by mouth 2 (Two) Times a Day As Needed for Moderate Pain.  Dispense: 120 tablet; Refill: 2    5. High risk medication use  -     Urine Drug Screen - Urine, Clean Catch  -     traMADol (ULTRAM) 50 MG tablet; Take 2 tablets by mouth 2 (Two) Times a Day As Needed for Moderate Pain.  Dispense: 120 tablet; Refill: 2    6. Stage 3a chronic kidney disease  -     Basic  metabolic panel  -     Ambulatory Referral to Nephrology  -     Iron Profile  -     Ferritin  -     traMADol (ULTRAM) 50 MG tablet; Take 2 tablets by mouth 2 (Two) Times a Day As Needed for Moderate Pain.  Dispense: 120 tablet; Refill: 2    7. Controlled substance agreement signed    8. Anemia, unspecified type  -     CBC Auto Differential    9. Chronic fatigue  -     CBC Auto Differential        Follow Up     Return in about 3 months (around 9/5/2024) for Medicare Wellness, medication refills and fasting labs.    He will follow up with Dr. Randolph next week - to discuss cardiac rehab, as well as plan moving forward for surgery. He has been encouraged to go back to the ED with any recurrent chest pain.     I suspect that his chronic fatigue is related to his current cardiac state, but we will check CBC and iron studies for reassurance. His B12 and folate were normal in March.     I am going to refill his Tramadol today - new CS agreement signed and UDS obtained.

## 2024-06-06 ENCOUNTER — TELEPHONE (OUTPATIENT)
Dept: CARDIOLOGY | Facility: CLINIC | Age: 84
End: 2024-06-06
Payer: MEDICARE

## 2024-06-06 DIAGNOSIS — N28.9 ABNORMAL RENAL FUNCTION: Primary | ICD-10-CM

## 2024-06-06 NOTE — PROGRESS NOTES
[facility]  Cardiology progress Note    Patient Name: Rudy Moore  : 1940    CHIEF COMPLAINT  CAD        Subjective   Subjective     HISTORY OF PRESENT ILLNESS    Rudy Moore is a 83 y.o. male with CAD and aortic stenosis.  No chest pain or shortness of breath.    REVIEW OF SYSTEMS    Constitutional:    No fever, no weight loss  Skin:     No rash  Otolaryngeal:    No difficulty swallowing  Cardiovascular: See HPI.  Pulmonary:    No cough, no sputum production    Personal History     Social History:    reports that he quit smoking about 23 years ago. His smoking use included cigarettes. He started smoking about 65 years ago. He has a 84 pack-year smoking history. He has never used smokeless tobacco. He reports current alcohol use. He reports that he does not use drugs.    Home Medications:  Current Outpatient Medications on File Prior to Visit   Medication Sig    aspirin 81 MG EC tablet Take 1 tablet by mouth Daily.    atorvastatin (LIPITOR) 20 MG tablet Take 1 tablet by mouth Every Night.    carvedilol (COREG) 12.5 MG tablet Take 1 tablet by mouth Every 12 (Twelve) Hours for 30 days.    carvedilol (COREG) 6.25 MG tablet Take 1 tablet by mouth 2 (Two) Times a Day.    cilostazol (PLETAL) 100 MG tablet Take 1 tablet by mouth 2 (Two) Times a Day.    clopidogrel (PLAVIX) 75 MG tablet Take 1 tablet by mouth Daily for 30 days.    glucose blood (True Metrix Blood Glucose Test) test strip 1 each by Other route Daily. Use as instructed    losartan (Cozaar) 25 MG tablet Take 1 tablet by mouth Daily for 30 days.    multivitamin with minerals tablet tablet Take 1 tablet by mouth Daily.    nitroglycerin (NITRODUR) 0.2 MG/HR patch Place 1 patch on the skin as directed by provider Daily for 30 days.    traMADol (ULTRAM) 50 MG tablet Take 2 tablets by mouth 2 (Two) Times a Day As Needed for Moderate Pain.     No current facility-administered medications on file prior to visit.       Past  Medical History:   Diagnosis Date    CAD S/P percutaneous coronary angioplasty 05/05/2022    Cataract     Diabetes mellitus, type 2     Essential hypertension 07/23/2020    GERD (gastroesophageal reflux disease)     History of coronary angioplasty with insertion of stent     Hyperlipidemia     Kidney stones     Peripheral arterial disease 07/23/2020    Seasonal allergies        Allergies:  No Known Allergies    Objective    Objective       Vitals:      There is no height or weight on file to calculate BMI.     PHYSICAL EXAM:    General Appearance:   well developed  well nourished  HENT:   oropharynx moist  lips not cyanotic  Neck:  thyroid not enlarged  supple  Respiratory:  no respiratory distress  normal breath sounds  no rales  Cardiovascular:  no jugular venous distention  regular rhythm  apical impulse normal  S1 normal, S2 normal  no S3, no S4   SM GR 3/6 AA  no rub, no thrill  carotid pulses normal; no bruit  pedal pulses normal  lower extremity edema: none    Skin:   warm, dry  Psychiatric:  judgement and insight appropriate  normal mood and affect        Result Review:  I have personally reviewed the available results from  [x]  Laboratory  [x]  EKG  [x]  Cardiology  [x]  Medications  [x]  Old records  []  Other:     Procedures  Lab Results   Component Value Date    CHOL 198 02/26/2024    CHOL 181 08/29/2023    CHOL 191 02/09/2023     Lab Results   Component Value Date    TRIG 185 (H) 02/26/2024    TRIG 111 08/29/2023    TRIG 101 02/09/2023     Lab Results   Component Value Date    HDL 80 (H) 02/26/2024    HDL 76 (H) 08/29/2023    HDL 83 (H) 02/09/2023     Lab Results   Component Value Date    LDL 87 02/26/2024    LDL 86 08/29/2023    LDL 90 02/09/2023     Lab Results   Component Value Date    VLDL 31 02/26/2024    VLDL 19 08/29/2023    VLDL 18 02/09/2023     Results for orders placed during the hospital encounter of 05/06/24    Adult Transthoracic Echo Complete W/ Cont if Necessary Per  Protocol    Interpretation Summary  Normal left ventricular systolic function.  Fibrocalcific aortic valve.  Moderate to moderately severe aortic stenosis with a mean gradient of 24 mm.  No change from previous echo.  Trace mitral regurgitation.   Results for orders placed during the hospital encounter of 24    Cardiac Catheterization/Vascular Study    Conclusion  [facility]  CARDIAC CATHETERIZATION PROCEDURE REPORT    Patient: Rudy Moore  : 1940  MRN: [id number]    Procedure Date:  24    Referring Physician:  Fabiano Randolph MD    Interventional Cardiologist:  Cm Ryan MD    Indication:  Non-STEMI  Known coronary artery disease, previous multivessel angioplasty    Clinical Presentation:  Mr. Moore underwent multivessel angioplasty stent placement 20 years back.  Recently, he is experiencing chest pain with activity.  He had a SPECT stress test done last year which showed inferior wall infarct with mild jailene-infarct ischemia.  Medical management was recommended.  He came to the hospital yesterday because of an episode of chest pain.  He was started on heparin infusion.  Cardiac markers including troponins were mildly elevated.  Today, he was brought to the Cath Lab to identify ischemic culprits    Procedure performed:  Left heart catheterization  Selective coronary angiography      Access Sites:  Right radial artery  Right common femoral artery    Findings:  1. Coronary Artery Anatomy:  Dominance: Right  Left Main: Normal with no stenosis.  Left Anterior Descending artery: Medium caliber vessel giving rise to various diagonal and septal  branches.  Entire left and descending artery and branches are free of any stenosis other than luminal irregularities.  Left circumflex Artery: Medium caliber, nondominant vessel.  Very proximal left circumflex artery has an eccentric lesion with some calcification.  The lesion is angiographically 50 to 60% severity in  1 view.  A stent is visible in proximal left circumflex artery with 30 to 40% in-stent restenosis.  OM 1 branch is a smaller vessel with no major lesions.  There is another lesion in the mid left circumflex artery which is angiographically 50 to 60% severity.  Terminal OM branches free of any stenosis.  Right Coronary Artery: Right coronary artery is 100% occluded in its ostium.  It is a chronic total occlusion.  A stent is visible in the ostial proximal RCA and also in the distal RCA.  Good retrograde collateral filling of right coronary artery is noted on injection of the left coronary system.  There are collaterals, mostly from LAD artery and septal perforators and fills retrogradely up to the proximal stent.    2. Hemodynamics:  The opening aortic pressure is 170/68 with a mean of 106 mmHg.  The aortic valve was not crossed during the study.    3. Left Ventriculogram: Not performed      Conclusions:  100% chronic total occlusion of right coronary artery, inserted previously placed stent with left-to-right collateral filling of mid to distal right coronary artery.  Patent stent in proximal left circumflex artery with 30 to 40% in-stent restenosis.  Nonobstructive and borderline lesions noted in proximal and mid left circumflex artery.    Recommendations:  Continue medical management, will initiate dual antiplatelet therapy and maximize antianginal medications.  Continue gentle IV fluid hydration for the rest of the day due to chronic kidney disease    Procedure Details:  Informed consent was obtained with an explanation of the risks, benefits and alternatives of the procedure. The patient was brought to the Cardiac Catheterization Laboratory and was prepped and draped in a standard sterile fashion. Moderate sedation with Fentanyl and Versed was administered by the circulating nurse. Lidocaine 2% was used to anesthetize the right radial artery and a 5/6 Slender sheath was placed.  We could not advance any wire  beyond the elbow region.  An angled glide wire was curling around.  A limited angiogram of the right radial artery was performed which showed a 360 degree loop of the proximal radial artery going into the brachial.  We are unable to navigate wire into the brachial.  At this time the sheath was taken out and TR band was applied for hemostasis of the radial arteriotomy site.    Next, right inguinal region was prepared, cleaned and draped in usual sterile fashion.  2% lidocaine was used to anesthetize the area.  Under ultrasound guidance, right common femoral artery was cannulated and a 5 Nepalese arterial sheath was advanced by modified Seldinger technique.  A 4 Nepalese JR4 catheter was used to engage the ostium of right coronary artery..  4 Nepalese JL 4 catheter was used to engage the ostium of left main coronary artery.  Diagnostic angiogram was performed with injection of nonionic contrast in all appropriate projections.  Aortic valve was not crossed during the study.  All the catheters images were done over the long exchange length wire.  At the end of the procedure, the likely sheath was pulled and TR band was applied for hemostasis.  Patient tolerated procedure well without any complications.  The results of the test were explained in detail with the patient and family members.      Cumulative fluoroscopy time: 8 min    Cumulative air kerma: 525 mGy    Total amount of contrast used: 47 ml of Isovue      Complications:  None.    Estimated Blood Loss:  Minimal.    Cm Ryan MD    05/24/24  11:41 EDT        Impression/Plan:   1. Coronary disease s/p PTCA/stent/atypical chest pain: Recent cardiac cath showed a totally occluded right coronary artery with left-to-right collaterals.  Continue nitroglycerin patch 0.2 mg once a day.  Continue aspirin 81 mg once a day.  Continue carvedilol 12.5 mg twice a day.  Recent cardiac cath shows totally occluded right coronary artery.  2.  Essential hypertension Uncontrolled:  Increase losartan to 100 mg once a day.  Monitor blood pressure regularly.  3.  Mixed hyperlipidemia: Continue Lipitor 20 mg once a day.  Monitor lipid and hepatic profile.  4.  Moderately severe aortic stenosis: No significant change from previous echo.           Fabiano Randolph MD   06/06/24   14:11 EDT

## 2024-06-06 NOTE — TELEPHONE ENCOUNTER
Caller: JOSE CRUZ SMITH    Relationship: Emergency Contact    Best call back number: [phone]    Which medication are you concerned about: CARVEDILOL     Who prescribed you this medication:  PUT HIM ON ONE AND WHEN HE WAS IN THE HOSPITAL THEY PUT HIM ON THE OTHER DOSE.    When did you start taking this medication: TWO WEEKS     What are your concerns: PATIENT IS ON TWO DIFFERENT DOSES OF CARVEDILOL ONE IS 6.25 AND 12.5     How long have you had these concerns: JUST HAD THE CONCERNS ON 6/6/24 DUE TO PRIMARY DOCTOR CALLING AND ASKING WHY HE WAS ON TWO DOSES OF THE SAME MEDICATION.

## 2024-06-11 ENCOUNTER — OFFICE VISIT (OUTPATIENT)
Dept: CARDIOLOGY | Facility: CLINIC | Age: 84
End: 2024-06-11
Payer: MEDICARE

## 2024-06-11 VITALS
HEART RATE: 81 BPM | HEIGHT: 69 IN | SYSTOLIC BLOOD PRESSURE: 182 MMHG | DIASTOLIC BLOOD PRESSURE: 90 MMHG | BODY MASS INDEX: 30.07 KG/M2 | WEIGHT: 203 LBS

## 2024-06-11 DIAGNOSIS — I25.10 CORONARY ARTERY DISEASE INVOLVING NATIVE CORONARY ARTERY OF NATIVE HEART WITHOUT ANGINA PECTORIS: Primary | ICD-10-CM

## 2024-06-11 DIAGNOSIS — I35.0 NONRHEUMATIC AORTIC VALVE STENOSIS: ICD-10-CM

## 2024-06-11 DIAGNOSIS — Z95.5 HISTORY OF CORONARY ANGIOPLASTY WITH INSERTION OF STENT: ICD-10-CM

## 2024-06-11 DIAGNOSIS — E78.2 HYPERLIPEMIA, MIXED: ICD-10-CM

## 2024-06-11 DIAGNOSIS — I10 HYPERTENSION, ESSENTIAL: ICD-10-CM

## 2024-06-11 PROCEDURE — 99214 OFFICE O/P EST MOD 30 MIN: CPT | Performed by: SPECIALIST

## 2024-06-11 PROCEDURE — 3080F DIAST BP >= 90 MM HG: CPT | Performed by: SPECIALIST

## 2024-06-11 PROCEDURE — 1160F RVW MEDS BY RX/DR IN RCRD: CPT | Performed by: SPECIALIST

## 2024-06-11 PROCEDURE — 3077F SYST BP >= 140 MM HG: CPT | Performed by: SPECIALIST

## 2024-06-11 PROCEDURE — 1159F MED LIST DOCD IN RCRD: CPT | Performed by: SPECIALIST

## 2024-06-11 RX ORDER — NITROGLYCERIN 0.3 MG/1
TABLET SUBLINGUAL
Qty: 100 TABLET | Refills: 11 | Status: SHIPPED | OUTPATIENT
Start: 2024-06-11

## 2024-06-11 RX ORDER — LOSARTAN POTASSIUM 100 MG/1
100 TABLET ORAL DAILY
Qty: 90 TABLET | Refills: 3 | Status: SHIPPED | OUTPATIENT
Start: 2024-06-11 | End: 2025-06-06

## 2024-06-12 ENCOUNTER — CLINICAL SUPPORT (OUTPATIENT)
Dept: FAMILY MEDICINE CLINIC | Facility: CLINIC | Age: 84
End: 2024-06-12
Payer: MEDICARE

## 2024-06-12 DIAGNOSIS — N28.9 ABNORMAL RENAL FUNCTION: ICD-10-CM

## 2024-06-12 LAB
ANION GAP SERPL CALCULATED.3IONS-SCNC: 8 MMOL/L (ref 5–15)
BUN SERPL-MCNC: 25 MG/DL (ref 8–23)
BUN/CREAT SERPL: 16.1 (ref 7–25)
CALCIUM SPEC-SCNC: 9.6 MG/DL (ref 8.6–10.5)
CHLORIDE SERPL-SCNC: 103 MMOL/L (ref 98–107)
CO2 SERPL-SCNC: 27 MMOL/L (ref 22–29)
CREAT SERPL-MCNC: 1.55 MG/DL (ref 0.76–1.27)
EGFRCR SERPLBLD CKD-EPI 2021: 44.4 ML/MIN/1.73
GLUCOSE SERPL-MCNC: 81 MG/DL (ref 65–99)
POTASSIUM SERPL-SCNC: 4.9 MMOL/L (ref 3.5–5.2)
SODIUM SERPL-SCNC: 138 MMOL/L (ref 136–145)

## 2024-06-12 PROCEDURE — 80048 BASIC METABOLIC PNL TOTAL CA: CPT | Performed by: NURSE PRACTITIONER

## 2024-06-12 PROCEDURE — 36415 COLL VENOUS BLD VENIPUNCTURE: CPT | Performed by: NURSE PRACTITIONER

## 2024-06-12 NOTE — PROGRESS NOTES
..  Venipuncture Blood Specimen Collection  Venipuncture performed in left arm by Renetta Negro with good hemostasis. Patient tolerated the procedure well without complications.   06/12/24   Renetta Negro

## 2024-06-24 RX ORDER — CARVEDILOL 12.5 MG/1
12.5 TABLET ORAL EVERY 12 HOURS SCHEDULED
Qty: 60 TABLET | Refills: 4 | Status: SHIPPED | OUTPATIENT
Start: 2024-06-24 | End: 2025-06-24

## 2024-06-24 RX ORDER — CLOPIDOGREL BISULFATE 75 MG/1
75 TABLET ORAL DAILY
Qty: 30 TABLET | Refills: 4 | Status: SHIPPED | OUTPATIENT
Start: 2024-06-24 | End: 2025-06-24

## 2024-06-24 RX ORDER — NITROGLYCERIN 40 MG/1
1 PATCH TRANSDERMAL DAILY
Qty: 30 PATCH | Refills: 0 | OUTPATIENT
Start: 2024-06-24 | End: 2024-07-24

## 2024-06-24 NOTE — TELEPHONE ENCOUNTER
Caller: JIMY SMITHE    Relationship: Emergency Contact    Best call back number: [phone]     Requested Prescriptions:   Requested Prescriptions     Pending Prescriptions Disp Refills    carvedilol (COREG) 12.5 MG tablet 60 tablet 0     Sig: Take 1 tablet by mouth Every 12 (Twelve) Hours for 30 days.    clopidogrel (PLAVIX) 75 MG tablet 30 tablet 0     Sig: Take 1 tablet by mouth Daily for 30 days.    nitroglycerin (NITRODUR) 0.2 MG/HR patch 30 patch 0     Sig: Place 1 patch on the skin as directed by provider Daily for 30 days.        Pharmacy where request should be sent: [id number] - [place], [place] - [address] AT [facility] - [phone]  - [phone] FX     Last office visit with prescribing clinician: 6/11/2024   Last telemedicine visit with prescribing clinician: Visit date not found   Next office visit with prescribing clinician: 10/15/2024     Does the patient have less than a 3 day supply:  [x] Yes  [] No    Would you like a call back once the refill request has been completed: [x] Yes [] No    If the office needs to give you a call back, can they leave a voicemail: [x] Yes [] No    Maria Dolores Connor Rep   06/24/24 10:57 EDT

## 2024-06-25 PROBLEM — I25.119 CORONARY ARTERY DISEASE INVOLVING NATIVE CORONARY ARTERY OF NATIVE HEART WITH ANGINA PECTORIS: Status: ACTIVE | Noted: 2024-06-25

## 2024-06-27 ENCOUNTER — TELEPHONE (OUTPATIENT)
Dept: CARDIOLOGY | Facility: CLINIC | Age: 84
End: 2024-06-27
Payer: MEDICARE

## 2024-06-27 RX ORDER — NITROGLYCERIN 40 MG/1
1 PATCH TRANSDERMAL DAILY
Qty: 30 PATCH | Refills: 3 | Status: SHIPPED | OUTPATIENT
Start: 2024-06-27

## 2024-06-27 NOTE — TELEPHONE ENCOUNTER
Original prescription from hospitalist, Dr. Randolph's last note indicates to continue.  Please send refill for prescription for nitroglycerin 0.2 mg transdermal patch to pharmacy of his choice.

## 2024-06-27 NOTE — TELEPHONE ENCOUNTER
Caller: JOSE CRUZ SMITH    Relationship: Emergency Contact    Best call back number: [phone]    What is the best time to reach you: ANYTIME     Who are you requesting to speak with (clinical staff, provider,  specific staff member): CLINICAL         What was the call regarding: PATIENT'S WIFE WOULD LIKE A REFILL FOR THE NITROGLYCERIN PATCH SENT TO THE PHARMACY. PREVIOUS REQUEST WAS DENIED.

## 2024-08-09 DIAGNOSIS — I10 ESSENTIAL HYPERTENSION: ICD-10-CM

## 2024-08-09 NOTE — TELEPHONE ENCOUNTER
Called home and mobile number. No answer or voicemail. Notes do show that pt discontinued medicine by Dr. Wilcox

## 2024-08-12 RX ORDER — LISINOPRIL 40 MG/1
40 TABLET ORAL DAILY
Qty: 90 TABLET | Refills: 1 | OUTPATIENT
Start: 2024-08-12

## 2024-09-05 ENCOUNTER — OFFICE VISIT (OUTPATIENT)
Dept: FAMILY MEDICINE CLINIC | Facility: CLINIC | Age: 84
End: 2024-09-05
Payer: MEDICARE

## 2024-09-05 VITALS
OXYGEN SATURATION: 97 % | WEIGHT: 195 LBS | BODY MASS INDEX: 28.8 KG/M2 | HEART RATE: 83 BPM | DIASTOLIC BLOOD PRESSURE: 80 MMHG | TEMPERATURE: 97.4 F | SYSTOLIC BLOOD PRESSURE: 138 MMHG

## 2024-09-05 DIAGNOSIS — I25.10 CORONARY ARTERY DISEASE INVOLVING NATIVE CORONARY ARTERY OF NATIVE HEART WITHOUT ANGINA PECTORIS: ICD-10-CM

## 2024-09-05 DIAGNOSIS — Z79.899 HIGH RISK MEDICATION USE: ICD-10-CM

## 2024-09-05 DIAGNOSIS — Z71.85 VACCINE COUNSELING: ICD-10-CM

## 2024-09-05 DIAGNOSIS — N18.31 TYPE 2 DIABETES MELLITUS WITH STAGE 3A CHRONIC KIDNEY DISEASE, WITHOUT LONG-TERM CURRENT USE OF INSULIN: ICD-10-CM

## 2024-09-05 DIAGNOSIS — Z00.00 MEDICARE ANNUAL WELLNESS VISIT, SUBSEQUENT: Primary | ICD-10-CM

## 2024-09-05 DIAGNOSIS — I73.9 PERIPHERAL VASCULAR DISEASE WITH CLAUDICATION: ICD-10-CM

## 2024-09-05 DIAGNOSIS — H93.8X2 EAR POPPING, LEFT: ICD-10-CM

## 2024-09-05 DIAGNOSIS — N18.31 STAGE 3A CHRONIC KIDNEY DISEASE: ICD-10-CM

## 2024-09-05 DIAGNOSIS — E78.5 HYPERLIPIDEMIA, UNSPECIFIED HYPERLIPIDEMIA TYPE: ICD-10-CM

## 2024-09-05 DIAGNOSIS — E11.22 TYPE 2 DIABETES MELLITUS WITH STAGE 3A CHRONIC KIDNEY DISEASE, WITHOUT LONG-TERM CURRENT USE OF INSULIN: ICD-10-CM

## 2024-09-05 DIAGNOSIS — E78.2 HYPERLIPEMIA, MIXED: ICD-10-CM

## 2024-09-05 DIAGNOSIS — H72.92 PERFORATION OF EAR DRUM, LEFT: ICD-10-CM

## 2024-09-05 DIAGNOSIS — M79.18 MUSCULOSKELETAL PAIN: ICD-10-CM

## 2024-09-05 DIAGNOSIS — L98.9 LESION OF SKIN OF FACE: ICD-10-CM

## 2024-09-05 DIAGNOSIS — M48.10 DISH (DIFFUSE IDIOPATHIC SKELETAL HYPEROSTOSIS): ICD-10-CM

## 2024-09-05 DIAGNOSIS — Z91.81 AT HIGH RISK FOR FALLS: ICD-10-CM

## 2024-09-05 LAB
ALBUMIN SERPL-MCNC: 4.3 G/DL (ref 3.5–5.2)
ALBUMIN UR-MCNC: <1.2 MG/DL
ALBUMIN/GLOB SERPL: 1.5 G/DL
ALP SERPL-CCNC: 79 U/L (ref 39–117)
ALT SERPL W P-5'-P-CCNC: 15 U/L (ref 1–41)
ANION GAP SERPL CALCULATED.3IONS-SCNC: 11.3 MMOL/L (ref 5–15)
ANION GAP SERPL CALCULATED.3IONS-SCNC: 8.5 MMOL/L (ref 5–15)
AST SERPL-CCNC: 20 U/L (ref 1–40)
BASOPHILS # BLD AUTO: 0.03 10*3/MM3 (ref 0–0.2)
BASOPHILS NFR BLD AUTO: 0.5 % (ref 0–1.5)
BILIRUB CONJ SERPL-MCNC: 0.3 MG/DL (ref 0–0.3)
BILIRUB SERPL-MCNC: 0.9 MG/DL (ref 0–1.2)
BUN SERPL-MCNC: 24 MG/DL (ref 8–23)
BUN SERPL-MCNC: 25 MG/DL (ref 8–23)
BUN/CREAT SERPL: 13.6 (ref 7–25)
BUN/CREAT SERPL: 13.8 (ref 7–25)
CALCIUM SPEC-SCNC: 10.1 MG/DL (ref 8.6–10.5)
CALCIUM SPEC-SCNC: 9.8 MG/DL (ref 8.6–10.5)
CHLORIDE SERPL-SCNC: 102 MMOL/L (ref 98–107)
CHLORIDE SERPL-SCNC: 102 MMOL/L (ref 98–107)
CHOLEST SERPL-MCNC: 171 MG/DL (ref 0–200)
CO2 SERPL-SCNC: 28.7 MMOL/L (ref 22–29)
CO2 SERPL-SCNC: 29.5 MMOL/L (ref 22–29)
CREAT SERPL-MCNC: 1.77 MG/DL (ref 0.76–1.27)
CREAT SERPL-MCNC: 1.81 MG/DL (ref 0.76–1.27)
CREAT UR-MCNC: 148.7 MG/DL
DEPRECATED RDW RBC AUTO: 44.3 FL (ref 37–54)
EGFRCR SERPLBLD CKD-EPI 2021: 36.9 ML/MIN/1.73
EGFRCR SERPLBLD CKD-EPI 2021: 37.9 ML/MIN/1.73
EOSINOPHIL # BLD AUTO: 0.17 10*3/MM3 (ref 0–0.4)
EOSINOPHIL NFR BLD AUTO: 2.7 % (ref 0.3–6.2)
ERYTHROCYTE [DISTWIDTH] IN BLOOD BY AUTOMATED COUNT: 12 % (ref 12.3–15.4)
GLOBULIN UR ELPH-MCNC: 2.9 GM/DL
GLUCOSE SERPL-MCNC: 130 MG/DL (ref 65–99)
GLUCOSE SERPL-MCNC: 135 MG/DL (ref 65–99)
HBA1C MFR BLD: 5.5 % (ref 4.8–5.6)
HCT VFR BLD AUTO: 40.1 % (ref 37.5–51)
HDLC SERPL-MCNC: 64 MG/DL (ref 40–60)
HGB BLD-MCNC: 13.6 G/DL (ref 13–17.7)
IMM GRANULOCYTES # BLD AUTO: 0.02 10*3/MM3 (ref 0–0.05)
IMM GRANULOCYTES NFR BLD AUTO: 0.3 % (ref 0–0.5)
LDLC SERPL CALC-MCNC: 87 MG/DL (ref 0–100)
LDLC/HDLC SERPL: 1.32 {RATIO}
LYMPHOCYTES # BLD AUTO: 1.77 10*3/MM3 (ref 0.7–3.1)
LYMPHOCYTES NFR BLD AUTO: 27.7 % (ref 19.6–45.3)
MCH RBC QN AUTO: 34.3 PG (ref 26.6–33)
MCHC RBC AUTO-ENTMCNC: 33.9 G/DL (ref 31.5–35.7)
MCV RBC AUTO: 101 FL (ref 79–97)
MICROALBUMIN/CREAT UR: NORMAL MG/G{CREAT}
MONOCYTES # BLD AUTO: 0.47 10*3/MM3 (ref 0.1–0.9)
MONOCYTES NFR BLD AUTO: 7.3 % (ref 5–12)
NEUTROPHILS NFR BLD AUTO: 3.94 10*3/MM3 (ref 1.7–7)
NEUTROPHILS NFR BLD AUTO: 61.5 % (ref 42.7–76)
NRBC BLD AUTO-RTO: 0 /100 WBC (ref 0–0.2)
PLATELET # BLD AUTO: 218 10*3/MM3 (ref 140–450)
PMV BLD AUTO: 9.5 FL (ref 6–12)
POTASSIUM SERPL-SCNC: 4.8 MMOL/L (ref 3.5–5.2)
POTASSIUM SERPL-SCNC: 5 MMOL/L (ref 3.5–5.2)
PROT SERPL-MCNC: 7.2 G/DL (ref 6–8.5)
RBC # BLD AUTO: 3.97 10*6/MM3 (ref 4.14–5.8)
SODIUM SERPL-SCNC: 140 MMOL/L (ref 136–145)
SODIUM SERPL-SCNC: 142 MMOL/L (ref 136–145)
T4 FREE SERPL-MCNC: 1.12 NG/DL (ref 0.92–1.68)
TRIGL SERPL-MCNC: 113 MG/DL (ref 0–150)
TSH SERPL DL<=0.05 MIU/L-ACNC: 2.89 UIU/ML (ref 0.27–4.2)
VLDLC SERPL-MCNC: 20 MG/DL (ref 5–40)
WBC NRBC COR # BLD AUTO: 6.4 10*3/MM3 (ref 3.4–10.8)

## 2024-09-05 PROCEDURE — 82043 UR ALBUMIN QUANTITATIVE: CPT | Performed by: NURSE PRACTITIONER

## 2024-09-05 PROCEDURE — 82248 BILIRUBIN DIRECT: CPT | Performed by: SPECIALIST

## 2024-09-05 PROCEDURE — 85025 COMPLETE CBC W/AUTO DIFF WBC: CPT | Performed by: NURSE PRACTITIONER

## 2024-09-05 PROCEDURE — 84439 ASSAY OF FREE THYROXINE: CPT | Performed by: NURSE PRACTITIONER

## 2024-09-05 PROCEDURE — 82570 ASSAY OF URINE CREATININE: CPT | Performed by: NURSE PRACTITIONER

## 2024-09-05 PROCEDURE — 84443 ASSAY THYROID STIM HORMONE: CPT | Performed by: NURSE PRACTITIONER

## 2024-09-05 PROCEDURE — 83036 HEMOGLOBIN GLYCOSYLATED A1C: CPT | Performed by: NURSE PRACTITIONER

## 2024-09-05 PROCEDURE — 80061 LIPID PANEL: CPT | Performed by: NURSE PRACTITIONER

## 2024-09-05 PROCEDURE — 80053 COMPREHEN METABOLIC PANEL: CPT | Performed by: SPECIALIST

## 2024-09-05 RX ORDER — ATORVASTATIN CALCIUM 20 MG/1
20 TABLET, FILM COATED ORAL NIGHTLY
Qty: 90 TABLET | Refills: 1 | Status: SHIPPED | OUTPATIENT
Start: 2024-09-05 | End: 2024-09-06

## 2024-09-05 RX ORDER — CLOPIDOGREL BISULFATE 75 MG/1
75 TABLET ORAL DAILY
Qty: 90 TABLET | Refills: 3 | Status: SHIPPED | OUTPATIENT
Start: 2024-09-05 | End: 2025-09-05

## 2024-09-05 RX ORDER — LISINOPRIL 40 MG/1
TABLET ORAL
COMMUNITY
Start: 2024-06-15 | End: 2024-09-05

## 2024-09-05 RX ORDER — CILOSTAZOL 100 MG/1
100 TABLET ORAL 2 TIMES DAILY
Qty: 180 TABLET | Refills: 1 | Status: SHIPPED | OUTPATIENT
Start: 2024-09-05

## 2024-09-05 NOTE — PROGRESS NOTES
Subjective   The ABCs of the Annual Wellness Visit  Medicare Wellness Visit    Rudy Moore is a 83 y.o. patient who presents for a Medicare Wellness Visit.    The following portions of the patient's history were reviewed and updated as appropriate: allergies, current medications, past family history, past medical history, past social history, past surgical history, and problem list.    Compared to one year ago, the patient's physical health is worse.    Compared to one year ago, the patient's mental health is the same.    Recent Hospitalizations:  This patient has had a [facility] admission record on file within the last 365 days.    Current Medical Providers:  Patient Care Team:  Kim Dailey APRN as PCP - General (Nurse Practitioner)  Mikaela Hernandez MD as Consulting Physician (Rheumatology)  Kerrie Beckford MD as Consulting Physician (Nephrology)  Fabiano Randolph MD as Consulting Physician (Cardiology)    Outpatient Medications Prior to Visit   Medication Sig Dispense Refill    aspirin 81 MG EC tablet Take 1 tablet by mouth Daily.      carvedilol (COREG) 12.5 MG tablet Take 1 tablet by mouth Every 12 (Twelve) Hours. 60 tablet 4    clopidogrel (PLAVIX) 75 MG tablet Take 1 tablet by mouth Daily. 30 tablet 4    glucose blood (True Metrix Blood Glucose Test) test strip 1 each by Other route Daily. Use as instructed 100 each 3    losartan (Cozaar) 100 MG tablet Take 1 tablet by mouth Daily for 360 days. 90 tablet 3    multivitamin with minerals tablet tablet Take 1 tablet by mouth Daily.      nitroglycerin (NITRODUR) 0.2 MG/HR patch Place 1 patch on the skin as directed by provider Daily. 30 patch 3    nitroglycerin (NITROSTAT) 0.3 MG SL tablet 1 under the tongue as needed for angina, may repeat q5mins for up three doses 100 tablet 11    traMADol (ULTRAM) 50 MG tablet Take 2 tablets by mouth 2 (Two) Times a Day As Needed for Moderate Pain. 120 tablet 2    atorvastatin  "(LIPITOR) 20 MG tablet Take 1 tablet by mouth Every Night. 90 tablet 1    cilostazol (PLETAL) 100 MG tablet Take 1 tablet by mouth 2 (Two) Times a Day. 180 tablet 1    lisinopril (PRINIVIL,ZESTRIL) 40 MG tablet        No facility-administered medications prior to visit.     Opioid medication/s are on active medication list.  and I have evaluated his active treatment plan and pain score trends (see table).  Vitals:    09/05/24 1059   PainSc:   2   PainLoc: Generalized     I have reviewed the chart for potential of high risk medication and harmful drug interactions in the elderly.        Aspirin is on active medication list. Aspirin use is indicated based on review of current medical condition/s. Pros and cons of this therapy have been discussed today. Benefits of this medication outweigh potential harm.  Patient has been encouraged to continue taking this medication.      Patient Active Problem List   Diagnosis    BPH (benign prostatic hyperplasia)    Uncontrolled hypertension    GERD (gastroesophageal reflux disease)    Stage 3 chronic kidney disease    Diabetes mellitus without complication    CAD S/P percutaneous coronary angioplasty    Hyperlipidemia LDL goal <70    Nonrheumatic aortic valve stenosis    Mild mitral regurgitation    NSTEMI (non-ST elevated myocardial infarction)    Coronary artery disease involving native coronary artery of native heart with angina pectoris     Advance Care Planning Advance Directive is not on file.      ACP discussion was held with the patient during this visit. Patient does not have an advance directive, declines further assistance.        Objective   Vitals:    09/05/24 1026 09/05/24 1059   BP: 138/80    Pulse: 83    Temp: 97.4 °F (36.3 °C)    TempSrc: Temporal    SpO2: 97%    Weight: 88.5 kg (195 lb)    PainSc:    2   PainLoc:  Generalized       Estimated body mass index is 28.8 kg/m² as calculated from the following:    Height as of 6/11/24: 175.3 cm (69\").    Weight as of " this encounter: 88.5 kg (195 lb).       Does the patient have evidence of cognitive impairment? No                                                                                                Health  Risk Assessment    Smoking Status:  Social History     Tobacco Use   Smoking Status Former    Current packs/day: 0.00    Average packs/day: 2.0 packs/day for 42.0 years (84.0 ttl pk-yrs)    Types: Cigarettes    Start date:     Quit date:     Years since quittin.6   Smokeless Tobacco Never   Tobacco Comments    SMOKES 1 TO 2 PACKS PER DAY, CURRENTLY USES OTHER TOBACCO PRODUCTS      Alcohol Consumption:  Social History     Substance and Sexual Activity   Alcohol Use Yes    Comment: DRINKS ALCHOL, 8-14 DRINKS PER WEEK        Fall Risk Screen  STEADI Fall Risk Assessment was completed, and patient is at HIGH risk for falls. Assessment completed on:2024    Depression Screenin/5/2024    10:25 AM   PHQ-2/PHQ-9 Depression Screening   Little Interest or Pleasure in Doing Things 0-->not at all   Feeling Down, Depressed or Hopeless 0-->not at all   PHQ-9: Brief Depression Severity Measure Score 0     Health Habits and Functional and Cognitive Screenin/5/2024    10:23 AM   Functional & Cognitive Status   Do you have difficulty preparing food and eating? No   Do you have difficulty bathing yourself, getting dressed or grooming yourself? No   Do you have difficulty using the toilet? No   Do you have difficulty moving around from place to place? No   Do you have trouble with steps or getting out of a bed or a chair? No   Current Diet Well Balanced Diet   Dental Exam Up to date   Eye Exam Up to date   Exercise (times per week) 7 times per week   Current Exercises Include Walking;Yard Work   Do you need help using the phone?  No   Are you deaf or do you have serious difficulty hearing?  No   Do you need help to go to places out of walking distance? No   Do you need help shopping? No   Do you need  help preparing meals?  No   Do you need help with housework?  No   Do you need help with laundry? No   Do you need help taking your medications? No   Do you need help managing money? No   Do you ever drive or ride in a car without wearing a seat belt? No   Have you felt unusual stress, anger or loneliness in the last month? No   Who do you live with? Spouse   If you need help, do you have trouble finding someone available to you? No   Have you been bothered in the last four weeks by sexual problems? No   Do you have difficulty concentrating, remembering or making decisions? Yes           Age-appropriate Screening Schedule:  Refer to the list below for future screening recommendations based on patient's age, sex and/or medical conditions. Orders for these recommended tests are listed in the plan section. The patient has been provided with a written plan.    Health Maintenance List  Health Maintenance   Topic Date Due    ZOSTER VACCINE (2 of 3) 04/12/2010    DIABETIC EYE EXAM  05/01/2024    HEMOGLOBIN A1C  08/26/2024    COVID-19 Vaccine (6 - 2023-24 season) 09/01/2024    INFLUENZA VACCINE  08/01/2024    LIPID PANEL  02/26/2025    URINE MICROALBUMIN  02/26/2025    BMI FOLLOWUP  06/05/2025    ANNUAL WELLNESS VISIT  09/05/2025    TDAP/TD VACCINES (2 - Td or Tdap) 01/27/2032    RSV Vaccine - Adults  Completed    Pneumococcal Vaccine 65+  Completed                                                                                                                                                CMS Preventative Services Quick Reference  Risk Factors Identified During Encounter    Alcohol Misuse: Patient encouraged to limit alcohol use to no more than 1 standard alcoholic beverage per day. (12 ounce beer, 6 ounce wine, one shot liquor)  Chronic Pain:  Current medication is effective; no change at this time.  Fall Risk-High or Moderate: Discussed Fall Prevention in the home, Information on Fall Prevention Shared in After Visit  Summary, and Sit to Stand Exercise Information Shared in After Visit Summary  Immunizations Discussed/Encouraged: Influenza, Shingrix, and COVID19  Inactivity/Sedentary: Patient was advised to exercise at least 150 minutes a week per CDC recommendations.  Dental Screening Recommended  Vision Screening Recommended    The above risks/problems have been discussed with the patient.    Pertinent information has been shared with the patient in the After Visit Summary.    An After Visit Summary and PPPS were made available to the patient.    Follow Up:     Next Medicare Wellness visit to be scheduled in 1 year.       Additional E&M Note during same encounter follows:  Patient has additional, significant, and separately identifiable condition(s)/problem(s) that require work above and beyond the Medicare Wellness Visit     Chief Complaint  Medicare Wellness-subsequent, Diabetes, and Hyperlipidemia    Subjective     HPI    Rudy is also being seen today for additional medical problem/s.    Blood pressure is normotensive on exam today, 138/80 - he had a NSTEMI in May of this year - keeping follow up with cardiology - next appointment in October of this year. He continues to endorse chest pain almost nightly. He is using nitro routinely at night. He and his wife state that cardiology is aware.  His wife sleeps downstairs but he sleeps upstairs.  The only time he ever goes upstairs is at night.  He does not notice chest pain during the day when he exerts himself.    He saw nephrology last week - they want to see him back in 3 months with labs prior - advised to consider SGLT2 in the future pending outcome of labs.     In regards to his diabetes, his blood sugar this AM was 97. He does not eat anything sweet and it never gets past 120. He is not currently on metformin due to renal function. He is not having frequent hunger, thirst, or urination. He does have nocturia 2-3 times per night. PSA in February normal.     He is  prescribed Tramadol 100mg BID for chronic pain related to DISH. He has stage IIIa chronic kidney disease and cannot take NSAIDs any longer. He will sometimes only take it once per day, but his wife states he takes it BID most days.     PEG: A Three-Item Scale Assessing Pain Intensity and Interference  0 being no pain 10 pain as bad as you can imagine  What number best describes your pain on average in the past week? 2  2.  What number best describes how, during the past week, pain has interfered with your enjoyment of life? 2  3.  What number best describes how, during the past week, pain has interfered with your general activity? 2    He voices complaints of left ear popping off and on for the past few weeks.  No ear pain.  Denies any history of trauma.  Denies decreased hearing.    His wife is concerned with a recurring skin lesion on his face.          Objective   Vital Signs:  /80   Pulse 83   Temp 97.4 °F (36.3 °C) (Temporal)   Wt 88.5 kg (195 lb)   SpO2 97%   BMI 28.80 kg/m²     Physical Exam  Vitals reviewed.   Constitutional:       General: He is not in acute distress.     Appearance: He is well-developed and overweight. He is not ill-appearing.   HENT:      Head: Normocephalic and atraumatic.      Right Ear: Hearing, tympanic membrane, ear canal and external ear normal. There is no impacted cerumen. Tympanic membrane is not injected, perforated, erythematous, retracted or bulging.      Left Ear: Hearing, ear canal and external ear normal. There is no impacted cerumen. Tympanic membrane is perforated (at the 3 o'clock position). Tympanic membrane is not injected, erythematous, retracted or bulging.      Nose: Nose normal.   Eyes:      General: No scleral icterus.        Right eye: No discharge.         Left eye: No discharge.      Extraocular Movements: Extraocular movements intact.      Conjunctiva/sclera: Conjunctivae normal.   Neck:      Vascular: No carotid bruit.      Trachea: Trachea normal.    Cardiovascular:      Rate and Rhythm: Normal rate and regular rhythm.      Pulses: Normal pulses.      Heart sounds: Murmur heard.      Systolic murmur is present with a grade of 3/6.      Comments: Trace LE edema present.  Pulmonary:      Effort: Pulmonary effort is normal.      Breath sounds: Normal breath sounds. No wheezing, rhonchi or rales.   Musculoskeletal:         General: Normal range of motion.      Cervical back: Normal range of motion and neck supple. No tenderness.      Right lower le+ Edema present.      Left lower le+ Edema present.   Lymphadenopathy:      Cervical: No cervical adenopathy.   Skin:     General: Skin is warm and dry.      Findings: Lesion present.             Comments: Erythematous patch along the left side of the face, adjacent to the mouth.   Neurological:      Mental Status: He is alert and oriented to person, place, and time.   Psychiatric:         Mood and Affect: Mood and affect normal.         Behavior: Behavior normal.         Thought Content: Thought content normal.         Judgment: Judgment normal.         The following data was reviewed by: RUSS Herrera on 2024:    No visits with results within 1 Month(s) from this visit.   Latest known visit with results is:   Clinical Support on 2024   Component Date Value    Glucose 2024 81     BUN 2024 25 (H)     Creatinine 2024 1.55 (H)     Sodium 2024 138     Potassium 2024 4.9     Chloride 2024 103     CO2 2024 27.0     Calcium 2024 9.6     BUN/Creatinine Ratio 2024 16.1     Anion Gap 2024 8.0     eGFR 2024 44.4 (L)      A1C Last 3 Results          2024    10:20   HGBA1C Last 3 Results   Hemoglobin A1C 5.80      Lipid Panel          2024    10:20   Lipid Panel   Total Cholesterol 198    Triglycerides 185    HDL Cholesterol 80    VLDL Cholesterol 31    LDL Cholesterol  87    LDL/HDL Ratio 1.01      TSH          2024     10:20   TSH   TSH 2.650      PSA          2/26/2024    10:20   PSA   PSA 0.038      Last Urine Toxicity  More data exists         Latest Ref Rng & Units 6/5/2024 2/26/2024   LAST URINE TOXICITY RESULTS   Creatinine, Urine mg/dL - 156.1    Barbiturates Screen, Urine Negative Negative  -   Benzodiazepine Screen, Urine Negative Negative  -   Cocaine Screen, Urine Negative Negative  -   Fentanyl, Urine Negative Negative  -   Methadone Screen , Urine Negative Negative  -      Details                 CONTROLLED SUBSTANCE AGREEMENT - SCAN - CONTROLLED SUBSTANCE AGREEMENT, [facility], 06/05/2024 (06/05/2024)           Assessment and Plan     Additional age appropriate preventative wellness advice topics were discussed during today's preventative wellness exam(some topics already addressed during AWV portion of the note above):      Physical Activity: Advised cardiovascular activity 150 minutes per week as tolerated. (example brisk walk for 30 minutes, 5 days a week).     Nutrition: Discussed nutrition plan with patient. Information shared in after visit summary. Goal is for a well balanced diet to enhance overall health.     Healthy Weight: Discussed current and goal BMI with patient. Steps to attain this goal discussed. Information shared in after visit summary.    Diagnoses and all orders for this visit:    1. Medicare annual wellness visit, subsequent (Primary)    2. Vaccine counseling  Comments:  Age appropriate: Shingrix, seasonal influenza, COVID-19 booster    3. At high risk for falls    4. Type 2 diabetes mellitus with stage 3a chronic kidney disease, without long-term current use of insulin  -     CBC Auto Differential  -     Comprehensive Metabolic Panel  -     Hemoglobin A1c  -     TSH+Free T4  -     Microalbumin / Creatinine Urine Ratio - Urine, Clean Catch    5. Stage 3a chronic kidney disease  -     Comprehensive Metabolic Panel    6. Hyperlipidemia, unspecified hyperlipidemia type  -     Comprehensive  Metabolic Panel  -     atorvastatin (LIPITOR) 20 MG tablet; Take 1 tablet by mouth Every Night.  Dispense: 90 tablet; Refill: 1    7. Peripheral vascular disease with claudication  -     cilostazol (PLETAL) 100 MG tablet; Take 1 tablet by mouth 2 (Two) Times a Day.  Dispense: 180 tablet; Refill: 1    8. DISH (diffuse idiopathic skeletal hyperostosis)    9. Musculoskeletal pain    10. High risk medication use  Comments:  Last RX for Tramadol on 8/22/24 - may call on 09/19/2024 for next refill    11. Ear popping, left  -     Ambulatory Referral to ENT (Otolaryngology)    12. Perforation of ear drum, left  -     Ambulatory Referral to ENT (Otolaryngology)    13. Lesion of skin of face  -     Ambulatory Referral to Dermatology    14. Hyperlipemia, mixed  -     Cancel: Hepatic Function Panel  -     Lipid Panel  -     Bilirubin, Direct    15. Coronary artery disease involving native coronary artery of native heart without angina pectoris  -     Basic Metabolic Panel          Orders Placed This Encounter   Procedures    CBC Auto Differential     Order Specific Question:   Release to patient     Answer:   Routine Release [[id number]]    Comprehensive Metabolic Panel     Order Specific Question:   Release to patient     Answer:   Routine Release [[id number]]    Hemoglobin A1c     Order Specific Question:   Release to patient     Answer:   Routine Release [[id number]]    TSH+Free T4     Order Specific Question:   Release to patient     Answer:   Routine Release [[id number]]    Microalbumin / Creatinine Urine Ratio - Urine, Clean Catch     Order Specific Question:   Release to patient     Answer:   Routine Release [[id number]]    Bilirubin, Direct     Order Specific Question:   Release to patient     Answer:   Routine Release [[id number]]    Ambulatory Referral to Dermatology     Referral Priority:   Routine     Referral Type:   Consultation     Referral Reason:   Specialty Services Required     Requested Specialty:    Dermatology     Number of Visits Requested:   1    Ambulatory Referral to ENT (Otolaryngology)     Referral Priority:   Routine     Referral Type:   Consultation     Referral Reason:   Specialty Services Required     Requested Specialty:   Otolaryngology     Number of Visits Requested:   1     New Medications Ordered This Visit   Medications    atorvastatin (LIPITOR) 20 MG tablet     Sig: Take 1 tablet by mouth Every Night.     Dispense:  90 tablet     Refill:  1    cilostazol (PLETAL) 100 MG tablet     Sig: Take 1 tablet by mouth 2 (Two) Times a Day.     Dispense:  180 tablet     Refill:  1          Follow Up     Return in about 3 months (around 12/18/2024) for Next scheduled follow up.    Annual physical exam encouraged.  Vaccination counseling today to include Shingrix, influenza, and COVID-19 booster.  They anticipate getting flu and COVID-19 vaccinations in October.    We will check A1c today.  Consider SGLT2 if A1c greater than 6.5%.  He has been managing well with diet.    In regards to his chronic kidney disease he will continue to see nephrology.  Next follow-up in 3 months.    Cardiology has ordered his lipid panel.  I will review once completed but in the interim he will continue Lipitor 20 mg nightly.  Refill Pletal at this time.  Doing well.    In regards to his chronic pain, he is doing well with tramadol.  He just received his last refill on 8/22/2024.  He will call me later this month for his next refill.  Urine drug screen and controlled substance agreement are up-to-date.    Upon evaluation he does appear to have a perforated left tympanic membrane.  He is having popping sensation in this ear.  I will give referral for ENT evaluation.    His wife is also concerned with a ongoing erythematous patch of skin on his face.  This appears to be nonhealing.  I will refer to dermatology to further assess.    Patient was given instructions and counseling regarding his condition or for health maintenance  advice. Please see specific information pulled into the AVS if appropriate.

## 2024-09-05 NOTE — PROGRESS NOTES
Venipuncture Blood Specimen Collection  Venipuncture performed in left arm by Lillie Tinsley with good hemostasis. Patient tolerated the procedure well without complications.   09/05/24   Marilyn Duong

## 2024-09-05 NOTE — PATIENT INSTRUCTIONS
Fall Prevention in the Home, Adult  Falls can cause injuries and affect people of all ages. There are many simple things that you can do to make your home safe and to help prevent falls.  If you need it, ask for help making these changes.  What actions can I take to prevent falls?  General information  Use good lighting in all rooms. Make sure to:  Replace any light bulbs that burn out.  Turn on lights if it is dark and use night-lights.  Keep items that you use often in easy-to-reach places. Lower the shelves around your home if needed.  Move furniture so that there are clear paths around it.  Do not keep throw rugs or other things on the floor that can make you trip.  If any of your floors are uneven, fix them.  Add color or contrast paint or tape to clearly graeme and help you see:  Grab bars or handrails.  First and last steps of staircases.  Where the edge of each step is.  If you use a ladder or stepladder:  Make sure that it is fully opened. Do not climb a closed ladder.  Make sure the sides of the ladder are locked in place.  Have someone hold the ladder while you use it.  Know where your pets are as you move through your home.  What can I do in the bathroom?         Keep the floor dry. Clean up any water that is on the floor right away.  Remove soap buildup in the bathtub or shower. Buildup makes bathtubs and showers slippery.  Use non-skid mats or decals on the floor of the bathtub or shower.  Attach bath mats securely with double-sided, non-slip rug tape.  If you need to sit down while you are in the shower, use a non-slip stool.  Install grab bars by the toilet and in the bathtub and shower. Do not use towel bars as grab bars.  What can I do in the bedroom?  Make sure that you have a light by your bed that is easy to reach.  Do not use any sheets or blankets on your bed that hang to the floor.  Have a firm bench or chair with side arms that you can use for support when you get dressed.  What can I do in  the kitchen?  Clean up any spills right away.  If you need to reach something above you, use a sturdy step stool that has a grab bar.  Keep electrical cables out of the way.  Do not use floor polish or wax that makes floors slippery.  What can I do with my stairs?  Do not leave anything on the stairs.  Make sure that you have a light switch at the top and the bottom of the stairs. Have them installed if you do not have them.  Make sure that there are handrails on both sides of the stairs. Fix handrails that are broken or loose. Make sure that handrails are as long as the staircases.  Install non-slip stair treads on all stairs in your home if they do not have carpet.  Avoid having throw rugs at the top or bottom of stairs, or secure the rugs with carpet tape to prevent them from moving.  Choose a carpet design that does not hide the edge of steps on the stairs. Make sure that carpet is firmly attached to the stairs. Fix any carpet that is loose or worn.  What can I do on the outside of my home?  Use bright outdoor lighting.  Repair the edges of walkways and driveways and fix any cracks. Clear paths of anything that can make you trip, such as tools or rocks.  Add color or contrast paint or tape to clearly graeme and help you see high doorway thresholds.  Trim any bushes or trees on the main path into your home.  Check that handrails are securely fastened and in good repair. Both sides of all steps should have handrails.  Install guardrails along the edges of any raised decks or porches.  Have leaves, snow, and ice cleared regularly. Use sand, salt, or ice melt on walkways during winter months if you live where there is ice and snow.  In the garage, clean up any spills right away, including grease or oil spills.  What other actions can I take?  Review your medicines with your health care provider. Some medicines can make you confused or feel dizzy. This can increase your chance of falling.  Wear closed-toe shoes that  fit well and support your feet. Wear shoes that have rubber soles and low heels.  Use a cane, walker, scooter, or crutches that help you move around if needed.  Talk with your provider about other ways that you can decrease your risk of falls. This may include seeing a physical therapist to learn to do exercises to improve movement and strength.  Where to find more information  Centers for Disease Control and Prevention, [place]: cdc.gov  [place] on Aging: prabhu.nih.gov  [place] on Aging: prabhu.nih.gov  Contact a health care provider if:  You are afraid of falling at home.  You feel weak, drowsy, or dizzy at home.  You fall at home.  Get help right away if you:  Lose consciousness or have trouble moving after a fall.  Have a fall that causes a head injury.  These symptoms may be an emergency. Get help right away. Call 911.  Do not wait to see if the symptoms will go away.  Do not drive yourself to the hospital.  This information is not intended to replace advice given to you by your health care provider. Make sure you discuss any questions you have with your health care provider.  Document Revised: 08/21/2023 Document Reviewed: 08/21/2023  [employer] Patient Education © 2024 [employer]    Sit-to-Stand Exercise    The sit-to-stand exercise (also known as the chair stand or chair rise exercise) strengthens your lower body and helps you maintain or improve your mobility and independence. The end goal is to do the sit-to-stand exercise without using your hands. This will be easier as you become stronger. You should always talk with your health care provider before starting any exercise program, especially if you have had recent surgery.  Do the exercise exactly as told by your health care provider and adjust it as directed. It is normal to feel mild stretching, pulling, tightness, or discomfort as you do this exercise, but you should stop right away if you feel sudden pain or your pain gets worse. Do  not begin doing this exercise until told by your health care provider.  What the sit-to-stand exercise does  The sit-to-stand exercise helps to strengthen the muscles in your thighs and the muscles in the center of your body that give you stability (core muscles). This exercise is especially helpful if:  You have had knee or hip surgery.  You have trouble getting up from a chair, out of a car, or off the toilet due to muscle weakness.  How to do the sit-to-stand exercise  Sit toward the front edge of a sturdy chair without armrests. Your knees should be bent and your feet should be flat on the floor and shoulder-width apart and underneath your hips.  Place your hands lightly on each side of the seat. Keep your back and neck as straight as possible, with your chest slightly forward.  Breathe in slowly. Lean forward and slightly shift your weight to the front of your feet.  Breathe out as you slowly stand up. Try not to support any weight with your hands.  Stand and pause for a full breath in and out.  Breathe in as you sit down slowly. Tighten your core and abdominal muscles to control your lowering as much as possible. You should lower yourself back to the chair slowly, not just drop back into the seat.  Breathe out slowly.  Do this exercise 10-15 times. If needed, do it fewer times until you build up strength.  Rest for 1 minute, then do another set of 10-15 repetitions.  To change the difficulty of the sit-to-stand exercise  If the exercise is too difficult, use a chair with sturdy armrests, and push off the armrests to help you come to the standing position. You can also use the armrests to help slowly lower yourself back to sitting. As this gets easier, try to use your arms less. You can also place a firm cushion or pillow on the chair to make the surface higher.  If this exercise is too easy, do not use your arms to help raise or lower yourself. You can also wear a weighted vest, use hand weights, increase your  repetitions, or try a lower chair.  General tips  You may feel tired when starting an exercise routine. This is normal.  You may have muscle soreness that lasts a few days. This is normal. As you get stronger, you may not feel muscle soreness.  Use smooth, steady movements.  Do not  hold your breath during strength exercises. This can cause unsafe changes in your blood pressure.  Breathe in slowly through your nose, and breathe out slowly through your mouth.  Summary  Strengthening your lower body is an important step to help you move safely and independently.  The sit-to-stand exercise helps strengthen the muscles in your thighs and core.  You should always talk with your health care provider before starting any exercise program, especially if you have had recent surgery.  This information is not intended to replace advice given to you by your health care provider. Make sure you discuss any questions you have with your health care provider.  Document Revised: 04/10/2022 Document Reviewed: 04/10/2022  [employer] Patient Education © 2024 [employer]    Exercising to Stay Healthy  To become healthy and stay healthy, it is recommended that you do moderate-intensity and vigorous-intensity exercise. You can tell that you are exercising at a moderate intensity if your heart starts beating faster and you start breathing faster but can still hold a conversation. You can tell that you are exercising at a vigorous intensity if you are breathing much harder and faster and cannot hold a conversation while exercising.  How can exercise benefit me?  Exercising regularly is important. It has many health benefits, such as:  Improving overall fitness, flexibility, and endurance.  Increasing bone density.  Helping with weight control.  Decreasing body fat.  Increasing muscle strength and endurance.  Reducing stress and tension, anxiety, depression, or anger.  Improving overall health.  What guidelines should I follow while  exercising?  Before you start a new exercise program, talk with your health care provider.  Do not exercise so much that you hurt yourself, feel dizzy, or get very short of breath.  Wear comfortable clothes and wear shoes with good support.  Drink plenty of water while you exercise to prevent dehydration or heat stroke.  Work out until your breathing and your heartbeat get faster (moderate intensity).  How often should I exercise?  Choose an activity that you enjoy, and set realistic goals. Your health care provider can help you make an activity plan that is individually designed and works best for you.  Exercise regularly as told by your health care provider. This may include:  Doing strength training two times a week, such as:  Lifting weights.  Using resistance bands.  Push-ups.  Sit-ups.  Yoga.  Doing a certain intensity of exercise for a given amount of time. Choose from these options:  A total of 150 minutes of moderate-intensity exercise every week.  A total of 75 minutes of vigorous-intensity exercise every week.  A mix of moderate-intensity and vigorous-intensity exercise every week.  Children, pregnant women, people who have not exercised regularly, people who are overweight, and older adults may need to talk with a health care provider about what activities are safe to perform. If you have a medical condition, be sure to talk with your health care provider before you start a new exercise program.  What are some exercise ideas?  Moderate-intensity exercise ideas include:  Walking 1 mile (1.6 km) in about 15 minutes.  Biking.  Hiking.  Golfing.  Dancing.  Water aerobics.  Vigorous-intensity exercise ideas include:  Walking 4.5 miles (7.2 km) or more in about 1 hour.  Jogging or running 5 miles (8 km) in about 1 hour.  Biking 10 miles (16.1 km) or more in about 1 hour.  Lap swimming.  Roller-skating or in-line skating.  Cross-country skiing.  Vigorous competitive sports, such as football, basketball, and  soccer.  Jumping rope.  Aerobic dancing.  What are some everyday activities that can help me get exercise?  Yard work, such as:  Pushing a .  Raking and bagging leaves.  Washing your car.  Pushing a stroller.  Shoveling snow.  Gardening.  Washing windows or floors.  How can I be more active in my day-to-day activities?  Use stairs instead of an elevator.  Take a walk during your lunch break.  If you drive, park your car farther away from your work or school.  If you take public transportation, get off one stop early and walk the rest of the way.  Stand up or walk around during all of your indoor phone calls.  Get up, stretch, and walk around every 30 minutes throughout the day.  Enjoy exercise with a friend. Support to continue exercising will help you keep a regular routine of activity.  Where to find more information  You can find more information about exercising to stay healthy from:  [place] Department of Health and Human Services: www.hhs.gov  Centers for Disease Control and Prevention (CDC): www.cdc.gov  Summary  Exercising regularly is important. It will improve your overall fitness, flexibility, and endurance.  Regular exercise will also improve your overall health. It can help you control your weight, reduce stress, and improve your bone density.  Do not exercise so much that you hurt yourself, feel dizzy, or get very short of breath.  Before you start a new exercise program, talk with your health care provider.  This information is not intended to replace advice given to you by your health care provider. Make sure you discuss any questions you have with your health care provider.  Document Revised: 04/15/2022 Document Reviewed: 04/15/2022  [employer]vier Patient Education © 2024 [employer].

## 2024-09-06 ENCOUNTER — TELEPHONE (OUTPATIENT)
Dept: CARDIOLOGY | Facility: CLINIC | Age: 84
End: 2024-09-06
Payer: MEDICARE

## 2024-09-06 DIAGNOSIS — E78.2 HYPERLIPEMIA, MIXED: Primary | ICD-10-CM

## 2024-09-06 RX ORDER — CARVEDILOL 12.5 MG/1
12.5 TABLET ORAL EVERY 12 HOURS SCHEDULED
Qty: 180 TABLET | Refills: 3 | Status: SHIPPED | OUTPATIENT
Start: 2024-09-06 | End: 2025-09-06

## 2024-09-06 RX ORDER — ATORVASTATIN CALCIUM 40 MG/1
40 TABLET, FILM COATED ORAL NIGHTLY
Qty: 90 TABLET | Refills: 3 | Status: SHIPPED | OUTPATIENT
Start: 2024-09-06

## 2024-09-06 NOTE — TELEPHONE ENCOUNTER
----- Message from Mallory Duong sent at 9/6/2024  9:12 AM EDT -----  Renal function has remained about the same, please forward labs to nephrology for review.  Electrolytes are stable. Liver enzymes are in normal range. Lipid panel shows LDL is not to goal of less than 70, would recommend increasing atorvastatin dose to 40 mg daily and repeat fasting lipid and hepatic function panel in 3 months

## 2024-09-20 DIAGNOSIS — M79.18 MUSCULOSKELETAL PAIN: ICD-10-CM

## 2024-09-20 DIAGNOSIS — Z79.899 HIGH RISK MEDICATION USE: ICD-10-CM

## 2024-09-20 DIAGNOSIS — N18.31 STAGE 3A CHRONIC KIDNEY DISEASE: ICD-10-CM

## 2024-09-20 DIAGNOSIS — M48.10 DISH (DIFFUSE IDIOPATHIC SKELETAL HYPEROSTOSIS): ICD-10-CM

## 2024-09-23 RX ORDER — TRAMADOL HYDROCHLORIDE 50 MG/1
100 TABLET ORAL 2 TIMES DAILY PRN
Qty: 120 TABLET | Refills: 2 | Status: SHIPPED | OUTPATIENT
Start: 2024-09-23

## 2024-10-07 ENCOUNTER — CLINICAL SUPPORT (OUTPATIENT)
Dept: FAMILY MEDICINE CLINIC | Facility: CLINIC | Age: 84
End: 2024-10-07
Payer: MEDICARE

## 2024-10-07 DIAGNOSIS — E78.2 HYPERLIPEMIA, MIXED: ICD-10-CM

## 2024-10-07 LAB
ALBUMIN SERPL-MCNC: 3.9 G/DL (ref 3.5–5.2)
ALP SERPL-CCNC: 74 U/L (ref 39–117)
ALT SERPL W P-5'-P-CCNC: 20 U/L (ref 1–41)
AST SERPL-CCNC: 19 U/L (ref 1–40)
BILIRUB CONJ SERPL-MCNC: <0.2 MG/DL (ref 0–0.3)
BILIRUB INDIRECT SERPL-MCNC: NORMAL MG/DL
BILIRUB SERPL-MCNC: 0.8 MG/DL (ref 0–1.2)
CHOLEST SERPL-MCNC: 162 MG/DL (ref 0–200)
HDLC SERPL-MCNC: 62 MG/DL (ref 40–60)
LDLC SERPL CALC-MCNC: 77 MG/DL (ref 0–100)
LDLC/HDLC SERPL: 1.18 {RATIO}
PROT SERPL-MCNC: 6.4 G/DL (ref 6–8.5)
TRIGL SERPL-MCNC: 134 MG/DL (ref 0–150)
VLDLC SERPL-MCNC: 23 MG/DL (ref 5–40)

## 2024-10-07 PROCEDURE — 36415 COLL VENOUS BLD VENIPUNCTURE: CPT | Performed by: NURSE PRACTITIONER

## 2024-10-07 PROCEDURE — 80061 LIPID PANEL: CPT | Performed by: NURSE PRACTITIONER

## 2024-10-07 PROCEDURE — 80076 HEPATIC FUNCTION PANEL: CPT | Performed by: NURSE PRACTITIONER

## 2024-10-07 NOTE — PROGRESS NOTES
Venipuncture Blood Specimen Collection  Venipuncture performed in left arm  by Lillie Tinsley with good hemostasis. Patient tolerated the procedure well without complications.   10/07/24   Lillie Tinsley

## 2024-10-10 NOTE — PROGRESS NOTES
[facility]  Cardiology progress Note    Patient Name: Rudy Moore  : 1940    CHIEF COMPLAINT  CAD        Subjective   Subjective     HISTORY OF PRESENT ILLNESS    Rudy Moore is a 83 y.o. male with history of CAD and PTCA/stent.  No chest pain.    REVIEW OF SYSTEMS    Constitutional:    No fever, no weight loss  Skin:     No rash  Otolaryngeal:    No difficulty swallowing  Cardiovascular: See HPI.  Pulmonary:    No cough, no sputum production    Personal History     Social History:    reports that he quit smoking about 23 years ago. His smoking use included cigarettes. He started smoking about 65 years ago. He has a 84 pack-year smoking history. He has never used smokeless tobacco. He reports current alcohol use. He reports that he does not use drugs.    Home Medications:  Current Outpatient Medications on File Prior to Visit   Medication Sig    aspirin 81 MG EC tablet Take 1 tablet by mouth Daily.    atorvastatin (LIPITOR) 40 MG tablet Take 1 tablet by mouth Every Night.    carvedilol (COREG) 12.5 MG tablet Take 1 tablet by mouth Every 12 (Twelve) Hours.    cilostazol (PLETAL) 100 MG tablet Take 1 tablet by mouth 2 (Two) Times a Day.    clopidogrel (PLAVIX) 75 MG tablet TAKE 1 TABLET BY MOUTH DAILY    glucose blood (True Metrix Blood Glucose Test) test strip 1 each by Other route Daily. Use as instructed    losartan (Cozaar) 100 MG tablet Take 1 tablet by mouth Daily for 360 days.    multivitamin with minerals tablet tablet Take 1 tablet by mouth Daily.    nitroglycerin (NITRODUR) 0.2 MG/HR patch Place 1 patch on the skin as directed by provider Daily.    nitroglycerin (NITROSTAT) 0.3 MG SL tablet 1 under the tongue as needed for angina, may repeat q5mins for up three doses    traMADol (ULTRAM) 50 MG tablet TAKE 2 TABLETS BY MOUTH TWICE DAILY AS NEEDED FOR MODERATE PAIN     No current facility-administered medications on file prior to visit.       Past Medical History:    Diagnosis Date    CAD S/P percutaneous coronary angioplasty 05/05/2022    Cataract     Diabetes mellitus, type 2     Essential hypertension 07/23/2020    GERD (gastroesophageal reflux disease)     History of coronary angioplasty with insertion of stent     Hyperlipidemia     Kidney stones     Peripheral arterial disease 07/23/2020    Seasonal allergies        Allergies:  No Known Allergies    Objective    Objective       Vitals:   Heart Rate:  [66] 66  BP: (178)/(80) 178/80  Body mass index is 29.09 kg/m².     PHYSICAL EXAM:    General Appearance:   well developed  well nourished  HENT:   oropharynx moist  lips not cyanotic  Neck:  thyroid not enlarged  supple  Respiratory:  no respiratory distress  normal breath sounds  no rales  Cardiovascular:  no jugular venous distention  regular rhythm  apical impulse normal  S1 normal, S2 normal  no S3, no S4   no murmur  no rub, no thrill  carotid pulses normal; no bruit  pedal pulses normal  lower extremity edema: none    Skin:   warm, dry  Psychiatric:  judgement and insight appropriate  normal mood and affect        Result Review:  I have personally reviewed the available results from  [x]  Laboratory  [x]  EKG  [x]  Cardiology  [x]  Medications  [x]  Old records  []  Other:     Procedures  Lab Results   Component Value Date    CHOL 162 10/07/2024    CHOL 171 09/05/2024    CHOL 198 02/26/2024     Lab Results   Component Value Date    TRIG 134 10/07/2024    TRIG 113 09/05/2024    TRIG 185 (H) 02/26/2024     Lab Results   Component Value Date    HDL 62 (H) 10/07/2024    HDL 64 (H) 09/05/2024    HDL 80 (H) 02/26/2024     Lab Results   Component Value Date    LDL 77 10/07/2024    LDL 87 09/05/2024    LDL 87 02/26/2024     Lab Results   Component Value Date    VLDL 23 10/07/2024    VLDL 20 09/05/2024    VLDL 31 02/26/2024     Results for orders placed during the hospital encounter of 05/06/24    Adult Transthoracic Echo Complete W/ Cont if Necessary Per  Protocol    Interpretation Summary  Normal left ventricular systolic function.  Fibrocalcific aortic valve.  Moderate to moderately severe aortic stenosis with a mean gradient of 24 mm.  No change from previous echo.  Trace mitral regurgitation.     Impression/Plan:   1. Coronary disease s/p PTCA/stent/atypical chest pain: Recent cardiac cath showed a totally occluded right coronary artery with left-to-right collaterals.  Continue nitroglycerin patch 0.2 mg once a day.  Continue aspirin 81 mg once a day.  Continue carvedilol 12.5 mg twice a day.  Recent cardiac cath shows totally occluded right coronary artery.  2.  Essential hypertension Uncontrolled: Continue losartan 100 mg once a day.  Continue carvedilol 12.5 mg twice a day.  Add amlodipine 5 mg once a day.  Monitor blood pressure regularly.  3.  Mixed hyperlipidemia: Continue Lipitor 40 mg once a day.  Monitor lipid and hepatic profile.  4.  Moderately severe aortic stenosis: No significant change from previous echo.                  Fabiano Randolph MD   10/15/24   12:08 EDT

## 2024-10-15 ENCOUNTER — OFFICE VISIT (OUTPATIENT)
Dept: CARDIOLOGY | Facility: CLINIC | Age: 84
End: 2024-10-15
Payer: MEDICARE

## 2024-10-15 VITALS
WEIGHT: 197 LBS | HEIGHT: 69 IN | SYSTOLIC BLOOD PRESSURE: 178 MMHG | DIASTOLIC BLOOD PRESSURE: 80 MMHG | HEART RATE: 66 BPM | BODY MASS INDEX: 29.18 KG/M2

## 2024-10-15 DIAGNOSIS — Z95.5 HISTORY OF CORONARY ANGIOPLASTY WITH INSERTION OF STENT: ICD-10-CM

## 2024-10-15 DIAGNOSIS — I10 HYPERTENSION, ESSENTIAL: ICD-10-CM

## 2024-10-15 DIAGNOSIS — I25.10 CORONARY ARTERY DISEASE INVOLVING NATIVE CORONARY ARTERY OF NATIVE HEART WITHOUT ANGINA PECTORIS: Primary | ICD-10-CM

## 2024-10-15 DIAGNOSIS — I35.0 NONRHEUMATIC AORTIC VALVE STENOSIS: ICD-10-CM

## 2024-10-15 DIAGNOSIS — E78.2 HYPERLIPEMIA, MIXED: ICD-10-CM

## 2024-10-15 PROCEDURE — 1159F MED LIST DOCD IN RCRD: CPT | Performed by: SPECIALIST

## 2024-10-15 PROCEDURE — 3079F DIAST BP 80-89 MM HG: CPT | Performed by: SPECIALIST

## 2024-10-15 PROCEDURE — 3077F SYST BP >= 140 MM HG: CPT | Performed by: SPECIALIST

## 2024-10-15 PROCEDURE — 99214 OFFICE O/P EST MOD 30 MIN: CPT | Performed by: SPECIALIST

## 2024-10-15 PROCEDURE — 1160F RVW MEDS BY RX/DR IN RCRD: CPT | Performed by: SPECIALIST

## 2024-10-15 RX ORDER — AMLODIPINE BESYLATE 5 MG/1
5 TABLET ORAL DAILY
Qty: 90 TABLET | Refills: 3 | Status: SHIPPED | OUTPATIENT
Start: 2024-10-15

## 2024-10-21 RX ORDER — NITROGLYCERIN 40 MG/1
1 PATCH TRANSDERMAL DAILY
Qty: 30 PATCH | Refills: 3 | Status: SHIPPED | OUTPATIENT
Start: 2024-10-21

## 2024-11-07 ENCOUNTER — CLINICAL SUPPORT (OUTPATIENT)
Dept: FAMILY MEDICINE CLINIC | Facility: CLINIC | Age: 84
End: 2024-11-07
Payer: MEDICARE

## 2024-11-07 DIAGNOSIS — N18.31 STAGE 3A CHRONIC KIDNEY DISEASE: Primary | ICD-10-CM

## 2024-11-07 LAB
25(OH)D3 SERPL-MCNC: 30 NG/ML (ref 30–100)
ALBUMIN SERPL-MCNC: 3.9 G/DL (ref 3.5–5.2)
ANION GAP SERPL CALCULATED.3IONS-SCNC: 9 MMOL/L (ref 5–15)
BACTERIA UR QL AUTO: NORMAL /HPF
BASOPHILS # BLD AUTO: 0.03 10*3/MM3 (ref 0–0.2)
BASOPHILS NFR BLD AUTO: 0.5 % (ref 0–1.5)
BILIRUB UR QL STRIP: NEGATIVE
BUN SERPL-MCNC: 26 MG/DL (ref 8–23)
BUN/CREAT SERPL: 13.6 (ref 7–25)
CALCIUM SPEC-SCNC: 9.1 MG/DL (ref 8.6–10.5)
CHLORIDE SERPL-SCNC: 105 MMOL/L (ref 98–107)
CLARITY UR: CLEAR
CO2 SERPL-SCNC: 29 MMOL/L (ref 22–29)
COLOR UR: YELLOW
CREAT SERPL-MCNC: 1.91 MG/DL (ref 0.76–1.27)
CREAT UR-MCNC: 84.4 MG/DL
DEPRECATED RDW RBC AUTO: 44 FL (ref 37–54)
EGFRCR SERPLBLD CKD-EPI 2021: 34.6 ML/MIN/1.73
EOSINOPHIL # BLD AUTO: 0.22 10*3/MM3 (ref 0–0.4)
EOSINOPHIL NFR BLD AUTO: 3.4 % (ref 0.3–6.2)
ERYTHROCYTE [DISTWIDTH] IN BLOOD BY AUTOMATED COUNT: 12.1 % (ref 12.3–15.4)
GLUCOSE SERPL-MCNC: 75 MG/DL (ref 65–99)
GLUCOSE UR STRIP-MCNC: NEGATIVE MG/DL
HCT VFR BLD AUTO: 37.4 % (ref 37.5–51)
HGB BLD-MCNC: 12.9 G/DL (ref 13–17.7)
HGB UR QL STRIP.AUTO: NEGATIVE
HYALINE CASTS UR QL AUTO: NORMAL /LPF
IMM GRANULOCYTES # BLD AUTO: 0.02 10*3/MM3 (ref 0–0.05)
IMM GRANULOCYTES NFR BLD AUTO: 0.3 % (ref 0–0.5)
KETONES UR QL STRIP: NEGATIVE
LEUKOCYTE ESTERASE UR QL STRIP.AUTO: ABNORMAL
LYMPHOCYTES # BLD AUTO: 2.16 10*3/MM3 (ref 0.7–3.1)
LYMPHOCYTES NFR BLD AUTO: 33.5 % (ref 19.6–45.3)
MCH RBC QN AUTO: 34.5 PG (ref 26.6–33)
MCHC RBC AUTO-ENTMCNC: 34.5 G/DL (ref 31.5–35.7)
MCV RBC AUTO: 100 FL (ref 79–97)
MONOCYTES # BLD AUTO: 0.54 10*3/MM3 (ref 0.1–0.9)
MONOCYTES NFR BLD AUTO: 8.4 % (ref 5–12)
NEUTROPHILS NFR BLD AUTO: 3.47 10*3/MM3 (ref 1.7–7)
NEUTROPHILS NFR BLD AUTO: 53.9 % (ref 42.7–76)
NITRITE UR QL STRIP: NEGATIVE
NRBC BLD AUTO-RTO: 0 /100 WBC (ref 0–0.2)
PH UR STRIP.AUTO: 6.5 [PH] (ref 5–8)
PHOSPHATE SERPL-MCNC: 3.6 MG/DL (ref 2.5–4.5)
PLATELET # BLD AUTO: 217 10*3/MM3 (ref 140–450)
PMV BLD AUTO: 9.6 FL (ref 6–12)
POTASSIUM SERPL-SCNC: 4.9 MMOL/L (ref 3.5–5.2)
PROT ?TM UR-MCNC: 7.1 MG/DL
PROT UR QL STRIP: NEGATIVE
PROT/CREAT UR: 0.08 MG/G{CREAT}
PTH-INTACT SERPL-MCNC: 48.5 PG/ML (ref 15–65)
RBC # BLD AUTO: 3.74 10*6/MM3 (ref 4.14–5.8)
RBC # UR STRIP: NORMAL /HPF
REF LAB TEST METHOD: NORMAL
SODIUM SERPL-SCNC: 143 MMOL/L (ref 136–145)
SP GR UR STRIP: 1.01 (ref 1–1.03)
SQUAMOUS #/AREA URNS HPF: NORMAL /HPF
UROBILINOGEN UR QL STRIP: ABNORMAL
WBC # UR STRIP: NORMAL /HPF
WBC NRBC COR # BLD AUTO: 6.44 10*3/MM3 (ref 3.4–10.8)

## 2024-11-07 PROCEDURE — 85025 COMPLETE CBC W/AUTO DIFF WBC: CPT | Performed by: NURSE PRACTITIONER

## 2024-11-07 PROCEDURE — 84156 ASSAY OF PROTEIN URINE: CPT | Performed by: NURSE PRACTITIONER

## 2024-11-07 PROCEDURE — 36415 COLL VENOUS BLD VENIPUNCTURE: CPT | Performed by: NURSE PRACTITIONER

## 2024-11-07 PROCEDURE — 81001 URINALYSIS AUTO W/SCOPE: CPT | Performed by: NURSE PRACTITIONER

## 2024-11-07 PROCEDURE — 82306 VITAMIN D 25 HYDROXY: CPT

## 2024-11-07 PROCEDURE — 80069 RENAL FUNCTION PANEL: CPT | Performed by: NURSE PRACTITIONER

## 2024-11-07 PROCEDURE — 82570 ASSAY OF URINE CREATININE: CPT | Performed by: NURSE PRACTITIONER

## 2024-11-07 PROCEDURE — 83970 ASSAY OF PARATHORMONE: CPT | Performed by: NURSE PRACTITIONER

## 2024-12-04 ENCOUNTER — OFFICE VISIT (OUTPATIENT)
Dept: FAMILY MEDICINE CLINIC | Facility: CLINIC | Age: 84
End: 2024-12-04
Payer: MEDICARE

## 2024-12-04 VITALS
HEART RATE: 65 BPM | BODY MASS INDEX: 29.62 KG/M2 | SYSTOLIC BLOOD PRESSURE: 160 MMHG | HEIGHT: 69 IN | DIASTOLIC BLOOD PRESSURE: 84 MMHG | WEIGHT: 200 LBS

## 2024-12-04 DIAGNOSIS — E11.22 TYPE 2 DIABETES MELLITUS WITH STAGE 3A CHRONIC KIDNEY DISEASE, WITHOUT LONG-TERM CURRENT USE OF INSULIN: Primary | ICD-10-CM

## 2024-12-04 DIAGNOSIS — Z79.899 HIGH RISK MEDICATION USE: ICD-10-CM

## 2024-12-04 DIAGNOSIS — M79.18 MUSCULOSKELETAL PAIN: ICD-10-CM

## 2024-12-04 DIAGNOSIS — D75.89 MACROCYTOSIS WITHOUT ANEMIA: ICD-10-CM

## 2024-12-04 DIAGNOSIS — N18.31 TYPE 2 DIABETES MELLITUS WITH STAGE 3A CHRONIC KIDNEY DISEASE, WITHOUT LONG-TERM CURRENT USE OF INSULIN: Primary | ICD-10-CM

## 2024-12-04 DIAGNOSIS — M48.10 DISH (DIFFUSE IDIOPATHIC SKELETAL HYPEROSTOSIS): ICD-10-CM

## 2024-12-04 DIAGNOSIS — N18.31 STAGE 3A CHRONIC KIDNEY DISEASE: ICD-10-CM

## 2024-12-04 LAB
BASOPHILS # BLD AUTO: 0.03 10*3/MM3 (ref 0–0.2)
BASOPHILS NFR BLD AUTO: 0.4 % (ref 0–1.5)
DEPRECATED RDW RBC AUTO: 44.7 FL (ref 37–54)
EOSINOPHIL # BLD AUTO: 0.18 10*3/MM3 (ref 0–0.4)
EOSINOPHIL NFR BLD AUTO: 2.7 % (ref 0.3–6.2)
ERYTHROCYTE [DISTWIDTH] IN BLOOD BY AUTOMATED COUNT: 12.6 % (ref 12.3–15.4)
FOLATE SERPL-MCNC: 16 NG/ML (ref 4.78–24.2)
HCT VFR BLD AUTO: 38.4 % (ref 37.5–51)
HGB BLD-MCNC: 13.2 G/DL (ref 13–17.7)
IMM GRANULOCYTES # BLD AUTO: 0.02 10*3/MM3 (ref 0–0.05)
IMM GRANULOCYTES NFR BLD AUTO: 0.3 % (ref 0–0.5)
LYMPHOCYTES # BLD AUTO: 2.29 10*3/MM3 (ref 0.7–3.1)
LYMPHOCYTES NFR BLD AUTO: 34 % (ref 19.6–45.3)
MCH RBC QN AUTO: 34 PG (ref 26.6–33)
MCHC RBC AUTO-ENTMCNC: 34.4 G/DL (ref 31.5–35.7)
MCV RBC AUTO: 99 FL (ref 79–97)
MONOCYTES # BLD AUTO: 0.58 10*3/MM3 (ref 0.1–0.9)
MONOCYTES NFR BLD AUTO: 8.6 % (ref 5–12)
NEUTROPHILS NFR BLD AUTO: 3.64 10*3/MM3 (ref 1.7–7)
NEUTROPHILS NFR BLD AUTO: 54 % (ref 42.7–76)
NRBC BLD AUTO-RTO: 0 /100 WBC (ref 0–0.2)
PLATELET # BLD AUTO: 223 10*3/MM3 (ref 140–450)
PMV BLD AUTO: 9.6 FL (ref 6–12)
RBC # BLD AUTO: 3.88 10*6/MM3 (ref 4.14–5.8)
VIT B12 BLD-MCNC: 542 PG/ML (ref 211–946)
WBC NRBC COR # BLD AUTO: 6.74 10*3/MM3 (ref 3.4–10.8)

## 2024-12-04 PROCEDURE — 82746 ASSAY OF FOLIC ACID SERUM: CPT | Performed by: NURSE PRACTITIONER

## 2024-12-04 PROCEDURE — 3077F SYST BP >= 140 MM HG: CPT | Performed by: NURSE PRACTITIONER

## 2024-12-04 PROCEDURE — 1159F MED LIST DOCD IN RCRD: CPT | Performed by: NURSE PRACTITIONER

## 2024-12-04 PROCEDURE — 1160F RVW MEDS BY RX/DR IN RCRD: CPT | Performed by: NURSE PRACTITIONER

## 2024-12-04 PROCEDURE — 99214 OFFICE O/P EST MOD 30 MIN: CPT | Performed by: NURSE PRACTITIONER

## 2024-12-04 PROCEDURE — G2211 COMPLEX E/M VISIT ADD ON: HCPCS | Performed by: NURSE PRACTITIONER

## 2024-12-04 PROCEDURE — 3078F DIAST BP <80 MM HG: CPT | Performed by: NURSE PRACTITIONER

## 2024-12-04 PROCEDURE — 1125F AMNT PAIN NOTED PAIN PRSNT: CPT | Performed by: NURSE PRACTITIONER

## 2024-12-04 PROCEDURE — 85025 COMPLETE CBC W/AUTO DIFF WBC: CPT | Performed by: NURSE PRACTITIONER

## 2024-12-04 PROCEDURE — 82607 VITAMIN B-12: CPT | Performed by: NURSE PRACTITIONER

## 2024-12-04 RX ORDER — EMPAGLIFLOZIN 10 MG/1
10 TABLET, FILM COATED ORAL
COMMUNITY
Start: 2024-11-14 | End: 2025-11-14

## 2024-12-04 NOTE — PROGRESS NOTES
..  Venipuncture Blood Specimen Collection  Venipuncture performed in Lt arm by Lillie Tinsley with good hemostasis. Patient tolerated the procedure well without complications.   12/04/24   Jo Kingsley MA

## 2024-12-04 NOTE — PROGRESS NOTES
Chief Complaint  Pain    History of Present Illness  Rudy Moore is a 84 y.o. male who presents to [facility] with a past medical history of    Past Medical History:   Diagnosis Date    CAD S/P percutaneous coronary angioplasty 05/05/2022    Cataract     Diabetes mellitus, type 2     Essential hypertension 07/23/2020    GERD (gastroesophageal reflux disease)     History of coronary angioplasty with insertion of stent     Hyperlipidemia     Kidney stones     Peripheral arterial disease 07/23/2020    Seasonal allergies      History of Present Illness  The patient is an 84-year-old male who presents to the office today for follow-up regarding pain management. He is accompanied by his wife.    He reports that his pain level, which he attributes to arthritis, is moderate, rating it as a 5 on a scale of 1 to 10. This pain has significantly impacted his quality of life and daily activities. 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  He takes tramadol for pain but has tried to reduced the dosage due to constipation. He will take 50-100mg BID PRN based on his pain level each day. He is not experiencing any pain at present.    His wife manages his medication regimen, which now includes Jardiance 10 mg, prescribed by his nephrologist. He also takes cilostazol for peripheral vascular disease and aspirin. His wife administers B12 tablets once or twice a week, and he takes a multivitamin daily.     Cardiology is managing his hypertension.  At a recent visit his blood pressure was elevated in the 170s systolic.  Amlodipine 5 mg daily was added.  Blood pressure is elevated on exam today x 2.  Initial blood pressure was elevated at 171/61 with an automated cuff.  Manual blood pressure checked and was 160/84.  He is without any chest pain, palpitations, headaches, dizziness, or shortness of breath.  In addition to amlodipine 5 mg daily  "he is taking carvedilol 12.5 mg twice daily and losartan 100 mg daily.    His dyslipidemia is managed with Lipitor 40 mg nightly.      PEG: A Three-Item Scale Assessing Pain Intensity and Interference  0 being no pain 10 pain as bad as you can imagine  What number best describes your pain on average in the past week? 5  2.  What number best describes how, during the past week, pain has interfered with your enjoyment of life? 5  3.  What number best describes how, during the past week, pain has interfered with your general activity? 5    Objective   Vital Signs:   Vitals:    12/04/24 1304 12/04/24 1331   BP: 171/61 160/84   BP Location:  Left arm   Patient Position:  Sitting   Cuff Size:  Adult   Pulse: 65    Weight: 90.7 kg (200 lb)    Height: 175.3 cm (69\")      Body mass index is 29.53 kg/m².    Wt Readings from Last 3 Encounters:   12/04/24 90.7 kg (200 lb)   10/15/24 89.4 kg (197 lb)   09/05/24 88.5 kg (195 lb)     BP Readings from Last 3 Encounters:   12/04/24 160/84   10/15/24 178/80   09/05/24 138/80       Health Maintenance   Topic Date Due    ZOSTER VACCINE (2 of 3) 04/12/2010    DIABETIC FOOT EXAM  02/13/2020    DIABETIC EYE EXAM  05/01/2024    COVID-19 Vaccine (6 - 2024-25 season) 12/06/2024 (Originally 9/1/2024)    HEMOGLOBIN A1C  03/05/2025    BMI FOLLOWUP  06/05/2025    ANNUAL WELLNESS VISIT  09/05/2025    LIPID PANEL  10/07/2025    TDAP/TD VACCINES (2 - Td or Tdap) 01/27/2032    RSV Vaccine - Adults  Completed    INFLUENZA VACCINE  Completed    Pneumococcal Vaccine 65+  Completed    URINE MICROALBUMIN  Discontinued       Physical Exam  Vitals reviewed.   Constitutional:       General: He is not in acute distress.     Appearance: He is well-developed and overweight. He is not ill-appearing.   HENT:      Head: Normocephalic and atraumatic.   Eyes:      General: No scleral icterus.        Right eye: No discharge.         Left eye: No discharge.      Extraocular Movements: Extraocular movements intact.    "   Conjunctiva/sclera: Conjunctivae normal.   Neck:      Thyroid: No thyromegaly.      Vascular: No carotid bruit.      Trachea: Trachea normal.   Cardiovascular:      Rate and Rhythm: Normal rate and regular rhythm.      Pulses: Normal pulses.      Heart sounds: No murmur heard.  Pulmonary:      Effort: Pulmonary effort is normal.      Breath sounds: Normal breath sounds. No wheezing, rhonchi or rales.   Musculoskeletal:         General: Normal range of motion.      Cervical back: Normal range of motion and neck supple. No tenderness.      Right lower leg: No edema.      Left lower leg: No edema.   Lymphadenopathy:      Cervical: No cervical adenopathy.   Skin:     General: Skin is warm and dry.   Neurological:      Mental Status: He is alert and oriented to person, place, and time.   Psychiatric:         Mood and Affect: Mood and affect normal.         Behavior: Behavior normal.         Thought Content: Thought content normal.         Judgment: Judgment normal.         Result Review :  The following data was reviewed by: RUSS Herrera on 12/04/2024:    Common labs          9/5/2024    11:17 9/5/2024    11:18 10/7/2024    13:21 11/7/2024    16:01   Common Labs   Glucose 135  130   75    BUN 24  25   26    Creatinine 1.77  1.81   1.91    Sodium 140  142   143    Potassium 4.8  5.0   4.9    Chloride 102  102   105    Calcium 10.1  9.8   9.1    Albumin 4.3   3.9  3.9    Total Bilirubin 0.9   0.8     Alkaline Phosphatase 79   74     AST (SGOT) 20   19     ALT (SGPT) 15   20     WBC 6.40    6.44    Hemoglobin 13.6    12.9    Hematocrit 40.1    37.4    Platelets 218    217    Total Cholesterol 171   162     Triglycerides 113   134     HDL Cholesterol 64   62     LDL Cholesterol  87   77     Hemoglobin A1C 5.50       Microalbumin, Urine <1.2           Urine Drug Screen - Urine, Clean Catch (06/05/2024 10:26)   CONTROLLED SUBSTANCE AGREEMENT - SCAN - CONTROLLED SUBSTANCE AGREEMENT, [facility], 06/05/2024  (06/05/2024)     Procedures        Assessment and Plan   Diagnoses and all orders for this visit:    1. Type 2 diabetes mellitus with stage 3a chronic kidney disease, without long-term current use of insulin (Primary)  -     Ambulatory Referral to Chronic Care Management Disease Education, Medication Adherence, Barriers to Care    2. DISH (diffuse idiopathic skeletal hyperostosis)    3. Musculoskeletal pain    4. High risk medication use    5. Stage 3a chronic kidney disease    6. Macrocytosis without anemia  -     CBC Auto Differential  -     Vitamin B12 & Folate        Assessment & Plan  1. Pain management.  He is currently taking tramadol for pain management due to musculoskeletal pain and is advised to take 1 to 2 tablets twice a day as needed for pain; do not exceed 4 tablets/day. He experiences constipation with tramadol, which has led to attempts to reduce the dosage. He is also taking cilostazol (Pletal) for peripheral vascular disease. He cannot take anti-inflammatories like ibuprofen, Aleve, or naproxen due to stage III chronic kidney disease. His pain level is reported as 5 out of 10, which moderately interferes with his enjoyment of life and general activity.    2. Elevated blood pressure.  His blood pressure readings have been consistently high, with a reading of 178/80 at the cardiologist's office and 171/61 at home. Today's reading is 160/84, indicating a slight decrease but still above the normal range. He is currently on amlodipine, which was added at his last cardiologist appointment. He is advised to monitor his blood pressure at home and return next week for a blood pressure check with the medical assistant.  If persistently elevated then I will discuss increasing amlodipine to 10 mg daily.    3. Elevated MCV and MCH levels.  His elevated MCV and MCH levels suggest a need for further investigation. His hemoglobin levels were within the normal range during the last check. A B12 and folate test  will be conducted today to further evaluate these findings. He has been taking B12 tablets once or twice a week, which may help.    4. Medication management.  He is currently on Jardiance 10 mg, which was prescribed by his kidney doctor. The cost of Jardiance is $47 for a 30-day supply. Karlene, the nurse for chronic care management, will contact him to discuss the necessary paperwork for potential patient assistance for Jardiance. He is also taking aspirin and atorvastatin as part of his medication regimen. He has refills available for cilostazol and tramadol.    Follow-up  Return next week for a blood pressure check.        FOLLOW UP  Return in about 3 months (around 3/4/2025) for Next scheduled follow up.    Patient was given instructions and counseling regarding his condition or for health maintenance advice. Please see specific information pulled into the AVS if appropriate.       Kim Dailey, APRN  12/04/24  13:46 EST    CURRENT & DISCONTINUED MEDICATIONS  Current Outpatient Medications   Medication Instructions    amLODIPine (NORVASC) 5 mg, Oral, Daily    aspirin 81 mg, Daily    atorvastatin (LIPITOR) 40 mg, Oral, Nightly    carvedilol (COREG) 12.5 mg, Oral, Every 12 Hours Scheduled    cilostazol (PLETAL) 100 mg, Oral, 2 Times Daily    clopidogrel (PLAVIX) 75 mg, Oral, Daily    glucose blood (True Metrix Blood Glucose Test) test strip 1 each, Other, Daily, Use as instructed    Jardiance 10 mg    losartan (COZAAR) 100 mg, Oral, Daily    multivitamin with minerals tablet tablet 1 tablet, Daily    nitroglycerin (NITRODUR) 0.2 MG/HR patch 1 patch, Transdermal, Daily    nitroglycerin (NITROSTAT) 0.3 MG SL tablet 1 under the tongue as needed for angina, may repeat q5mins for up three doses    traMADol (ULTRAM) 100 mg, Oral, 2 Times Daily PRN       There are no discontinued medications.     Patient or patient representative verbalized consent for the use of Ambient Listening during the visit with  Kim PARKS  AURELIA DaileyN for chart documentation. 12/4/2024  13:46 EST

## 2024-12-05 ENCOUNTER — REFERRAL TRIAGE (OUTPATIENT)
Dept: CASE MANAGEMENT | Facility: OTHER | Age: 84
End: 2024-12-05
Payer: MEDICARE

## 2024-12-12 ENCOUNTER — CLINICAL SUPPORT (OUTPATIENT)
Dept: FAMILY MEDICINE CLINIC | Facility: CLINIC | Age: 84
End: 2024-12-12
Payer: MEDICARE

## 2024-12-12 VITALS — DIASTOLIC BLOOD PRESSURE: 82 MMHG | SYSTOLIC BLOOD PRESSURE: 136 MMHG

## 2024-12-12 DIAGNOSIS — I10 UNCONTROLLED HYPERTENSION: Primary | ICD-10-CM

## 2024-12-12 PROCEDURE — 93790 AMBL BP MNTR W/SW I&R: CPT

## 2024-12-17 ENCOUNTER — PATIENT OUTREACH (OUTPATIENT)
Dept: CASE MANAGEMENT | Facility: OTHER | Age: 84
End: 2024-12-17
Payer: MEDICARE

## 2024-12-17 DIAGNOSIS — E11.9 DIABETES MELLITUS WITHOUT COMPLICATION: ICD-10-CM

## 2024-12-17 DIAGNOSIS — I10 UNCONTROLLED HYPERTENSION: Primary | ICD-10-CM

## 2024-12-17 NOTE — OUTREACH NOTE
AMBULATORY CASE MANAGEMENT NOTE    Names and Relationships of Patient/Support Persons: Contact: JOSE CRUZ SMITH; Relationship: Emergency Contact -     CCM Interim Update    Called and spoke with patients wife regarding Jardiance patient assistance. Patient is not available at this time to discuss CCM program. Patients wife will let patient know that paperwork is available for signature at PCP office for Jardiance.     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        Education Documentation  No documentation found.        MARIANA PABLO  Ambulatory Case Management    12/17/2024, 12:39 EST

## 2024-12-23 ENCOUNTER — PATIENT OUTREACH (OUTPATIENT)
Dept: CASE MANAGEMENT | Facility: OTHER | Age: 84
End: 2024-12-23
Payer: MEDICARE

## 2024-12-23 DIAGNOSIS — E11.9 DIABETES MELLITUS WITHOUT COMPLICATION: ICD-10-CM

## 2024-12-23 DIAGNOSIS — I10 UNCONTROLLED HYPERTENSION: Primary | ICD-10-CM

## 2024-12-23 NOTE — OUTREACH NOTE
AMBULATORY CASE MANAGEMENT NOTE    Names and Relationships of Patient/Support Persons: Contact: Paperwork; Relationship: Other -     Care Coordination    Received signed form and income from patient. Placed on PCP desk for signature.    Education Documentation  No documentation found.        MARIANA PABLO  Ambulatory Case Management    12/23/2024, 14:54 EST

## 2025-01-02 ENCOUNTER — PATIENT OUTREACH (OUTPATIENT)
Dept: CASE MANAGEMENT | Facility: OTHER | Age: 85
End: 2025-01-02
Payer: MEDICARE

## 2025-01-02 DIAGNOSIS — I10 UNCONTROLLED HYPERTENSION: Primary | ICD-10-CM

## 2025-01-02 DIAGNOSIS — E11.9 DIABETES MELLITUS WITHOUT COMPLICATION: ICD-10-CM

## 2025-01-03 ENCOUNTER — PATIENT OUTREACH (OUTPATIENT)
Dept: CASE MANAGEMENT | Facility: OTHER | Age: 85
End: 2025-01-03
Payer: MEDICARE

## 2025-01-03 DIAGNOSIS — I10 UNCONTROLLED HYPERTENSION: Primary | ICD-10-CM

## 2025-01-03 DIAGNOSIS — E11.9 DIABETES MELLITUS WITHOUT COMPLICATION: ICD-10-CM

## 2025-01-03 NOTE — OUTREACH NOTE
AMBULATORY CASE MANAGEMENT NOTE    Names and Relationships of Patient/Support Persons: Contact: [facility]; Relationship: Other -     Care Coordination    Faxed application to [facility]    Education Documentation  No documentation found.        MARIANA PABLO  Ambulatory Case Management    1/3/2025, 15:54 EST

## 2025-01-13 ENCOUNTER — PATIENT OUTREACH (OUTPATIENT)
Dept: CASE MANAGEMENT | Facility: OTHER | Age: 85
End: 2025-01-13
Payer: MEDICARE

## 2025-01-13 DIAGNOSIS — E11.9 DIABETES MELLITUS WITHOUT COMPLICATION: ICD-10-CM

## 2025-01-13 DIAGNOSIS — I10 UNCONTROLLED HYPERTENSION: Primary | ICD-10-CM

## 2025-01-13 NOTE — OUTREACH NOTE
AMBULATORY CASE MANAGEMENT NOTE    Names and Relationships of Patient/Support Persons: Contact: [employer]; Relationship: Other -     Care Coordination    Called [employer]. Application is still processing and a determination has not been reached yet. I have placed an outreach for later this week to check again.    Education Documentation  No documentation found.        MARIANA PABLO  Ambulatory Case Management    1/13/2025, 10:09 EST

## 2025-01-16 ENCOUNTER — PATIENT OUTREACH (OUTPATIENT)
Dept: CASE MANAGEMENT | Facility: OTHER | Age: 85
End: 2025-01-16
Payer: MEDICARE

## 2025-01-16 DIAGNOSIS — I10 UNCONTROLLED HYPERTENSION: Primary | ICD-10-CM

## 2025-01-16 DIAGNOSIS — E11.9 DIABETES MELLITUS WITHOUT COMPLICATION: ICD-10-CM

## 2025-01-16 NOTE — OUTREACH NOTE
AMBULATORY CASE MANAGEMENT NOTE    Names and Relationships of Patient/Support Persons: Contact:  [employer]; Relationship: Other -     Care Coordination    Called and spoke with  [employer]. Patient application has not been processed yet and [facility] states this could take up to several weeks due to peak enrollment period. Will try again next week.    Education Documentation  No documentation found.        MARIANA PABLO  Ambulatory Case Management    1/16/2025, 15:22 EST

## 2025-01-24 ENCOUNTER — PATIENT OUTREACH (OUTPATIENT)
Dept: CASE MANAGEMENT | Facility: OTHER | Age: 85
End: 2025-01-24
Payer: MEDICARE

## 2025-01-24 NOTE — OUTREACH NOTE
AMBULATORY CASE MANAGEMENT NOTE    Names and Relationships of Patient/Support Persons: Contact: JOSE CRUZ SMITH; Relationship: Emergency Contact -     Care Coordination    Called and spoke with [employer] regarding Jardiance application. Application was approved and medication will be shipped straight to patients home address.    Education Documentation  No documentation found.        MARIANA PABLO  Ambulatory Case Management    1/24/2025, 10:52 EST

## 2025-02-07 ENCOUNTER — TRANSCRIBE ORDERS (OUTPATIENT)
Dept: FAMILY MEDICINE CLINIC | Facility: CLINIC | Age: 85
End: 2025-02-07
Payer: MEDICARE

## 2025-02-07 ENCOUNTER — TELEPHONE (OUTPATIENT)
Dept: FAMILY MEDICINE CLINIC | Facility: CLINIC | Age: 85
End: 2025-02-07

## 2025-02-07 DIAGNOSIS — N18.30 STAGE 3 CHRONIC KIDNEY DISEASE, UNSPECIFIED WHETHER STAGE 3A OR 3B CKD: Primary | ICD-10-CM

## 2025-02-07 NOTE — TELEPHONE ENCOUNTER
Caller: JOSE CRUZ SMITH    Relationship to patient: Emergency Contact    Best call back number: [phone]     Patient is needing: CALLER STATES SHE SPOKE WITH THE PATIENTS KIDNEY DOCTOR AFTER GETTING OFF THE PHONE WITH [employer] AND THEY STATED THEY WOULD REFAX ORDERS.    CAN SOMEONE PLEASE CALL JOSE CRUZ BACK AS SOON AS THEY ARE RECEIVED. IF THEY HAVE NOT BEEN RECEIVED BY THE END OF THE DAY, PLEASE LET JOSE CRUZ KNOW.

## 2025-02-07 NOTE — TELEPHONE ENCOUNTER
Caller: JOSE CRUZ SMITH    Relationship: Emergency Contact    Best call back number: [phone]     What is the best time to reach you: ANY    Who are you requesting to speak with (clinical staff, provider,  specific staff member): CLINICAL STAFF    What was the call regarding: PATIENTS WIFE WOULD LIKE TO KNOW IF LAB ORDERS FROM THE KIDNEY DOCTOR HAVE BEEN RECEIVED

## 2025-02-10 ENCOUNTER — OFFICE VISIT (OUTPATIENT)
Dept: FAMILY MEDICINE CLINIC | Facility: CLINIC | Age: 85
End: 2025-02-10
Payer: MEDICARE

## 2025-02-10 VITALS
BODY MASS INDEX: 29.33 KG/M2 | DIASTOLIC BLOOD PRESSURE: 68 MMHG | HEART RATE: 79 BPM | TEMPERATURE: 97.1 F | WEIGHT: 198 LBS | OXYGEN SATURATION: 89 % | HEIGHT: 69 IN | SYSTOLIC BLOOD PRESSURE: 128 MMHG

## 2025-02-10 DIAGNOSIS — R05.1 ACUTE COUGH: ICD-10-CM

## 2025-02-10 DIAGNOSIS — R09.89 CHEST CONGESTION: ICD-10-CM

## 2025-02-10 DIAGNOSIS — N18.30 STAGE 3 CHRONIC KIDNEY DISEASE, UNSPECIFIED WHETHER STAGE 3A OR 3B CKD: ICD-10-CM

## 2025-02-10 DIAGNOSIS — R06.02 SHORTNESS OF BREATH: ICD-10-CM

## 2025-02-10 DIAGNOSIS — J40 BRONCHITIS: Primary | ICD-10-CM

## 2025-02-10 LAB
ALBUMIN SERPL-MCNC: 3.7 G/DL (ref 3.5–5.2)
ALBUMIN SERPL-MCNC: 3.8 G/DL (ref 3.5–5.2)
ALBUMIN/GLOB SERPL: 1.1 G/DL
ALP SERPL-CCNC: 102 U/L (ref 39–117)
ALT SERPL W P-5'-P-CCNC: 119 U/L (ref 1–41)
ANION GAP SERPL CALCULATED.3IONS-SCNC: 10.8 MMOL/L (ref 5–15)
ANION GAP SERPL CALCULATED.3IONS-SCNC: 9 MMOL/L (ref 5–15)
AST SERPL-CCNC: 116 U/L (ref 1–40)
BACTERIA UR QL AUTO: NORMAL /HPF
BASOPHILS # BLD AUTO: 0.02 10*3/MM3 (ref 0–0.2)
BASOPHILS # BLD AUTO: 0.02 10*3/MM3 (ref 0–0.2)
BASOPHILS NFR BLD AUTO: 0.4 % (ref 0–1.5)
BASOPHILS NFR BLD AUTO: 0.4 % (ref 0–1.5)
BILIRUB SERPL-MCNC: 0.6 MG/DL (ref 0–1.2)
BILIRUB UR QL STRIP: NEGATIVE
BUN SERPL-MCNC: 20 MG/DL (ref 8–23)
BUN SERPL-MCNC: 20 MG/DL (ref 8–23)
BUN/CREAT SERPL: 10.1 (ref 7–25)
BUN/CREAT SERPL: 10.6 (ref 7–25)
CALCIUM SPEC-SCNC: 9.3 MG/DL (ref 8.6–10.5)
CALCIUM SPEC-SCNC: 9.4 MG/DL (ref 8.6–10.5)
CHLORIDE SERPL-SCNC: 100 MMOL/L (ref 98–107)
CHLORIDE SERPL-SCNC: 102 MMOL/L (ref 98–107)
CLARITY UR: CLEAR
CO2 SERPL-SCNC: 28 MMOL/L (ref 22–29)
CO2 SERPL-SCNC: 28.2 MMOL/L (ref 22–29)
COLOR UR: YELLOW
CREAT SERPL-MCNC: 1.88 MG/DL (ref 0.76–1.27)
CREAT SERPL-MCNC: 1.98 MG/DL (ref 0.76–1.27)
CREAT UR-MCNC: 150.3 MG/DL
DEPRECATED RDW RBC AUTO: 46.5 FL (ref 37–54)
DEPRECATED RDW RBC AUTO: 46.5 FL (ref 37–54)
EGFRCR SERPLBLD CKD-EPI 2021: 32.9 ML/MIN/1.73
EGFRCR SERPLBLD CKD-EPI 2021: 35 ML/MIN/1.73
EOSINOPHIL # BLD AUTO: 0.03 10*3/MM3 (ref 0–0.4)
EOSINOPHIL # BLD AUTO: 0.04 10*3/MM3 (ref 0–0.4)
EOSINOPHIL NFR BLD AUTO: 0.5 % (ref 0.3–6.2)
EOSINOPHIL NFR BLD AUTO: 0.8 % (ref 0.3–6.2)
ERYTHROCYTE [DISTWIDTH] IN BLOOD BY AUTOMATED COUNT: 12.5 % (ref 12.3–15.4)
ERYTHROCYTE [DISTWIDTH] IN BLOOD BY AUTOMATED COUNT: 12.6 % (ref 12.3–15.4)
EXPIRATION DATE: NORMAL
FLUAV AG UPPER RESP QL IA.RAPID: NOT DETECTED
FLUBV AG UPPER RESP QL IA.RAPID: NOT DETECTED
GLOBULIN UR ELPH-MCNC: 3.4 GM/DL
GLUCOSE SERPL-MCNC: 125 MG/DL (ref 65–99)
GLUCOSE SERPL-MCNC: 135 MG/DL (ref 65–99)
GLUCOSE UR STRIP-MCNC: ABNORMAL MG/DL
HCT VFR BLD AUTO: 39.6 % (ref 37.5–51)
HCT VFR BLD AUTO: 39.7 % (ref 37.5–51)
HGB BLD-MCNC: 12.9 G/DL (ref 13–17.7)
HGB BLD-MCNC: 13.1 G/DL (ref 13–17.7)
HGB UR QL STRIP.AUTO: NEGATIVE
HYALINE CASTS UR QL AUTO: NORMAL /LPF
IMM GRANULOCYTES # BLD AUTO: 0.04 10*3/MM3 (ref 0–0.05)
IMM GRANULOCYTES # BLD AUTO: 0.06 10*3/MM3 (ref 0–0.05)
IMM GRANULOCYTES NFR BLD AUTO: 0.7 % (ref 0–0.5)
IMM GRANULOCYTES NFR BLD AUTO: 1.2 % (ref 0–0.5)
INTERNAL CONTROL: NORMAL
KETONES UR QL STRIP: NEGATIVE
LEUKOCYTE ESTERASE UR QL STRIP.AUTO: NEGATIVE
LYMPHOCYTES # BLD AUTO: 1.62 10*3/MM3 (ref 0.7–3.1)
LYMPHOCYTES # BLD AUTO: 1.73 10*3/MM3 (ref 0.7–3.1)
LYMPHOCYTES NFR BLD AUTO: 31.5 % (ref 19.6–45.3)
LYMPHOCYTES NFR BLD AUTO: 31.6 % (ref 19.6–45.3)
Lab: NORMAL
MCH RBC QN AUTO: 32.7 PG (ref 26.6–33)
MCH RBC QN AUTO: 33.2 PG (ref 26.6–33)
MCHC RBC AUTO-ENTMCNC: 32.6 G/DL (ref 31.5–35.7)
MCHC RBC AUTO-ENTMCNC: 33 G/DL (ref 31.5–35.7)
MCV RBC AUTO: 100.3 FL (ref 79–97)
MCV RBC AUTO: 100.5 FL (ref 79–97)
MONOCYTES # BLD AUTO: 0.47 10*3/MM3 (ref 0.1–0.9)
MONOCYTES # BLD AUTO: 0.57 10*3/MM3 (ref 0.1–0.9)
MONOCYTES NFR BLD AUTO: 10.4 % (ref 5–12)
MONOCYTES NFR BLD AUTO: 9.1 % (ref 5–12)
NEUTROPHILS NFR BLD AUTO: 2.94 10*3/MM3 (ref 1.7–7)
NEUTROPHILS NFR BLD AUTO: 3.08 10*3/MM3 (ref 1.7–7)
NEUTROPHILS NFR BLD AUTO: 56.4 % (ref 42.7–76)
NEUTROPHILS NFR BLD AUTO: 57 % (ref 42.7–76)
NITRITE UR QL STRIP: NEGATIVE
NRBC BLD AUTO-RTO: 0 /100 WBC (ref 0–0.2)
NRBC BLD AUTO-RTO: 0 /100 WBC (ref 0–0.2)
NT-PROBNP SERPL-MCNC: 1505 PG/ML (ref 0–1800)
PH UR STRIP.AUTO: 5.5 [PH] (ref 5–8)
PHOSPHATE SERPL-MCNC: 2.9 MG/DL (ref 2.5–4.5)
PLATELET # BLD AUTO: 211 10*3/MM3 (ref 140–450)
PLATELET # BLD AUTO: 214 10*3/MM3 (ref 140–450)
PMV BLD AUTO: 9.7 FL (ref 6–12)
PMV BLD AUTO: 9.8 FL (ref 6–12)
POTASSIUM SERPL-SCNC: 4.8 MMOL/L (ref 3.5–5.2)
POTASSIUM SERPL-SCNC: 4.9 MMOL/L (ref 3.5–5.2)
PROT ?TM UR-MCNC: 26.3 MG/DL
PROT SERPL-MCNC: 7.1 G/DL (ref 6–8.5)
PROT UR QL STRIP: ABNORMAL
PROT/CREAT UR: 0.17 MG/G{CREAT}
RBC # BLD AUTO: 3.95 10*6/MM3 (ref 4.14–5.8)
RBC # BLD AUTO: 3.95 10*6/MM3 (ref 4.14–5.8)
RBC # UR STRIP: NORMAL /HPF
REF LAB TEST METHOD: NORMAL
SARS-COV-2 AG UPPER RESP QL IA.RAPID: NOT DETECTED
SODIUM SERPL-SCNC: 139 MMOL/L (ref 136–145)
SODIUM SERPL-SCNC: 139 MMOL/L (ref 136–145)
SP GR UR STRIP: >1.03 (ref 1–1.03)
SQUAMOUS #/AREA URNS HPF: NORMAL /HPF
UROBILINOGEN UR QL STRIP: ABNORMAL
WBC # UR STRIP: NORMAL /HPF
WBC NRBC COR # BLD AUTO: 5.15 10*3/MM3 (ref 3.4–10.8)
WBC NRBC COR # BLD AUTO: 5.47 10*3/MM3 (ref 3.4–10.8)

## 2025-02-10 PROCEDURE — 84156 ASSAY OF PROTEIN URINE: CPT | Performed by: INTERNAL MEDICINE

## 2025-02-10 PROCEDURE — 83880 ASSAY OF NATRIURETIC PEPTIDE: CPT

## 2025-02-10 PROCEDURE — 80053 COMPREHEN METABOLIC PANEL: CPT

## 2025-02-10 PROCEDURE — 1160F RVW MEDS BY RX/DR IN RCRD: CPT

## 2025-02-10 PROCEDURE — 81001 URINALYSIS AUTO W/SCOPE: CPT | Performed by: INTERNAL MEDICINE

## 2025-02-10 PROCEDURE — 96372 THER/PROPH/DIAG INJ SC/IM: CPT

## 2025-02-10 PROCEDURE — 99214 OFFICE O/P EST MOD 30 MIN: CPT

## 2025-02-10 PROCEDURE — 82570 ASSAY OF URINE CREATININE: CPT | Performed by: INTERNAL MEDICINE

## 2025-02-10 PROCEDURE — 1159F MED LIST DOCD IN RCRD: CPT

## 2025-02-10 PROCEDURE — 85025 COMPLETE CBC W/AUTO DIFF WBC: CPT | Performed by: INTERNAL MEDICINE

## 2025-02-10 PROCEDURE — 3074F SYST BP LT 130 MM HG: CPT

## 2025-02-10 PROCEDURE — 84100 ASSAY OF PHOSPHORUS: CPT

## 2025-02-10 PROCEDURE — 87428 SARSCOV & INF VIR A&B AG IA: CPT

## 2025-02-10 PROCEDURE — 3078F DIAST BP <80 MM HG: CPT

## 2025-02-10 PROCEDURE — 85025 COMPLETE CBC W/AUTO DIFF WBC: CPT

## 2025-02-10 PROCEDURE — 1125F AMNT PAIN NOTED PAIN PRSNT: CPT

## 2025-02-10 RX ORDER — AZITHROMYCIN 250 MG/1
TABLET, FILM COATED ORAL
Qty: 6 TABLET | Refills: 0 | Status: SHIPPED | OUTPATIENT
Start: 2025-02-10

## 2025-02-10 RX ORDER — METHYLPREDNISOLONE SODIUM SUCCINATE 40 MG/ML
40 INJECTION INTRAMUSCULAR; INTRAVENOUS ONCE
Status: COMPLETED | OUTPATIENT
Start: 2025-02-10 | End: 2025-02-10

## 2025-02-10 RX ORDER — BENZONATATE 100 MG/1
200 CAPSULE ORAL 3 TIMES DAILY PRN
Qty: 42 CAPSULE | Refills: 0 | Status: SHIPPED | OUTPATIENT
Start: 2025-02-10 | End: 2025-02-17

## 2025-02-10 RX ORDER — GUAIFENESIN 600 MG/1
1200 TABLET, EXTENDED RELEASE ORAL 2 TIMES DAILY
Qty: 20 TABLET | Refills: 0 | Status: SHIPPED | OUTPATIENT
Start: 2025-02-10 | End: 2025-02-20

## 2025-02-10 RX ADMIN — METHYLPREDNISOLONE SODIUM SUCCINATE 40 MG: 40 INJECTION INTRAMUSCULAR; INTRAVENOUS at 15:48

## 2025-02-10 NOTE — PROGRESS NOTES
Venipuncture Blood Specimen Collection  Venipuncture performed in left arm  by Lillie Tinsley with good hemostasis. Patient tolerated the procedure well without complications.   02/10/25   Lillie Tinsley

## 2025-02-19 RX ORDER — NITROGLYCERIN 40 MG/1
1 PATCH TRANSDERMAL DAILY
Qty: 90 PATCH | Refills: 0 | Status: SHIPPED | OUTPATIENT
Start: 2025-02-19

## 2025-02-26 DIAGNOSIS — M48.10 DISH (DIFFUSE IDIOPATHIC SKELETAL HYPEROSTOSIS): ICD-10-CM

## 2025-02-26 DIAGNOSIS — M79.18 MUSCULOSKELETAL PAIN: ICD-10-CM

## 2025-02-26 DIAGNOSIS — N18.31 STAGE 3A CHRONIC KIDNEY DISEASE: ICD-10-CM

## 2025-02-26 DIAGNOSIS — Z79.899 HIGH RISK MEDICATION USE: ICD-10-CM

## 2025-02-26 RX ORDER — TRAMADOL HYDROCHLORIDE 50 MG/1
100 TABLET ORAL 2 TIMES DAILY PRN
Qty: 120 TABLET | Refills: 0 | Status: SHIPPED | OUTPATIENT
Start: 2025-02-26

## 2025-03-06 ENCOUNTER — OFFICE VISIT (OUTPATIENT)
Dept: FAMILY MEDICINE CLINIC | Facility: CLINIC | Age: 85
End: 2025-03-06
Payer: MEDICARE

## 2025-03-06 VITALS
BODY MASS INDEX: 28.44 KG/M2 | SYSTOLIC BLOOD PRESSURE: 130 MMHG | OXYGEN SATURATION: 95 % | DIASTOLIC BLOOD PRESSURE: 70 MMHG | HEART RATE: 67 BPM | WEIGHT: 192.6 LBS | TEMPERATURE: 98.6 F

## 2025-03-06 DIAGNOSIS — I73.9 PERIPHERAL VASCULAR DISEASE WITH CLAUDICATION: ICD-10-CM

## 2025-03-06 DIAGNOSIS — E11.22 TYPE 2 DIABETES MELLITUS WITH STAGE 3A CHRONIC KIDNEY DISEASE, WITHOUT LONG-TERM CURRENT USE OF INSULIN: Primary | ICD-10-CM

## 2025-03-06 DIAGNOSIS — M79.18 MUSCULOSKELETAL PAIN: ICD-10-CM

## 2025-03-06 DIAGNOSIS — Z12.5 PROSTATE CANCER SCREENING: ICD-10-CM

## 2025-03-06 DIAGNOSIS — N18.31 STAGE 3A CHRONIC KIDNEY DISEASE: ICD-10-CM

## 2025-03-06 DIAGNOSIS — L84 CALLUS OF FOOT: ICD-10-CM

## 2025-03-06 DIAGNOSIS — E78.2 HYPERLIPEMIA, MIXED: ICD-10-CM

## 2025-03-06 DIAGNOSIS — Z79.899 HIGH RISK MEDICATION USE: ICD-10-CM

## 2025-03-06 DIAGNOSIS — N18.31 TYPE 2 DIABETES MELLITUS WITH STAGE 3A CHRONIC KIDNEY DISEASE, WITHOUT LONG-TERM CURRENT USE OF INSULIN: Primary | ICD-10-CM

## 2025-03-06 DIAGNOSIS — M48.10 DISH (DIFFUSE IDIOPATHIC SKELETAL HYPEROSTOSIS): ICD-10-CM

## 2025-03-06 DIAGNOSIS — E11.9 ENCOUNTER FOR DIABETIC FOOT EXAM: ICD-10-CM

## 2025-03-06 RX ORDER — CILOSTAZOL 100 MG/1
100 TABLET ORAL 2 TIMES DAILY
Qty: 180 TABLET | Refills: 1 | Status: SHIPPED | OUTPATIENT
Start: 2025-03-06

## 2025-03-06 NOTE — PROGRESS NOTES
Chief Complaint  Diabetes, Hyperlipidemia, and Hypertension    History of Present Illness  Rudy Moore is a 84 y.o. male who presents to [facility] with a past medical history of    Past Medical History:   Diagnosis Date    CAD S/P percutaneous coronary angioplasty 05/05/2022    Cataract     Diabetes mellitus, type 2     Essential hypertension 07/23/2020    GERD (gastroesophageal reflux disease)     History of coronary angioplasty with insertion of stent     Hyperlipidemia     Kidney stones     Peripheral arterial disease 07/23/2020    Seasonal allergies        History of Present Illness  The patient is an 84-year-old male who presents to the office today for his 6-month follow-up appointment on chronic health conditions and for medication refills.    He was prescribed a Z-Deshawn in February 2025, which he completed. He was also given Breztri inhaler, but he did not use it due to its cost. His respiratory function has improved significantly following a period of severe congestion and dyspnea. A steroid injection was administered, which he reports as beneficial. Currently, he is not experiencing wheezing, shortness of breath, or abnormal sputum production.    He continues to take tramadol for pain management, with a recent refill provided by RUSS Paez. The dosage is typically two tablets twice daily, but occasionally he opts for a single tablet BID due to constipation. He reports no feelings of bloating, nausea, or straining during bowel movements. Over-the-counter stool softeners are used as needed.    He is currently on his second bottle of cilostazol out of a three-bottle prescription for claudication. Symptoms stable.     He is on Jardiance 10 mg once daily for diabetes, which has been effective in controlling his blood sugar levels. He reports no symptoms of frequent thirst, hunger, or urination.    He is also on amlodipine, carvedilol, losartan, Plavix, and Lipitor  for cardiovascular health. He reports no chest pain while using a nitroglycerin patch, which he finds helpful.  He and his cardiologist have been discussing a procedure, of which she cannot clearly define, but he is considering not having the procedure due to the risks.  He will follow-up with cardiology in a few months.    He has an upcoming appointment with his nephrologist at the end of March 2025. He reports no issues with his feet, including numbness or tingling. His last eye exam was conducted over a year ago.    MEDICATIONS  Current: Tramadol, cilostazol, Jardiance, amlodipine, carvedilol, losartan, Plavix, atorvastatin, nitroglycerin patch.      Objective   Vital Signs:   Vitals:    03/06/25 1302   BP: 130/70   Pulse: 67   Temp: 98.6 °F (37 °C)   SpO2: 95%   Weight: 87.4 kg (192 lb 9.6 oz)   PainSc: 0-No pain     Body mass index is 28.44 kg/m².    Wt Readings from Last 3 Encounters:   03/06/25 87.4 kg (192 lb 9.6 oz)   02/10/25 89.8 kg (198 lb)   12/04/24 90.7 kg (200 lb)     BP Readings from Last 3 Encounters:   03/06/25 130/70   02/10/25 128/68   12/12/24 136/82       Health Maintenance   Topic Date Due    ZOSTER VACCINE (2 of 3) 04/12/2010    DIABETIC EYE EXAM  05/01/2024    URINE MICROALBUMIN-CREATININE RATIO (uACR)  02/26/2025    HEMOGLOBIN A1C  03/05/2025    COVID-19 Vaccine (6 - 2024-25 season) 02/10/2026 (Originally 9/1/2024)    BMI FOLLOWUP  06/05/2025    ANNUAL WELLNESS VISIT  09/05/2025    LIPID PANEL  10/07/2025    DIABETIC FOOT EXAM  03/06/2026    TDAP/TD VACCINES (2 - Td or Tdap) 01/27/2032    RSV Vaccine - Adults  Completed    INFLUENZA VACCINE  Completed    Pneumococcal Vaccine 50+  Completed       Physical Exam  Vitals reviewed.   Constitutional:       General: He is not in acute distress.     Appearance: He is well-developed and overweight.   HENT:      Head: Normocephalic and atraumatic.   Eyes:      General: No scleral icterus.        Right eye: No discharge.         Left eye: No  discharge.      Extraocular Movements: Extraocular movements intact.      Conjunctiva/sclera: Conjunctivae normal.   Neck:      Vascular: No carotid bruit.      Trachea: Trachea normal.   Cardiovascular:      Rate and Rhythm: Normal rate and regular rhythm.      Pulses: Normal pulses.           Dorsalis pedis pulses are 1+ on the right side and 1+ on the left side.        Posterior tibial pulses are 1+ on the right side and 1+ on the left side.      Heart sounds: Murmur heard.      Systolic murmur is present with a grade of 3/6.   Pulmonary:      Effort: Pulmonary effort is normal.      Breath sounds: Normal breath sounds. No wheezing, rhonchi or rales.   Musculoskeletal:         General: Normal range of motion.      Cervical back: Normal range of motion and neck supple. No tenderness.      Right lower leg: No edema.      Left lower leg: No edema.      Right foot: Normal range of motion. No deformity.      Left foot: Normal range of motion. No deformity.   Feet:      Right foot:      Protective Sensation: 9 sites tested.  9 sites sensed.      Skin integrity: Skin integrity normal. Callus and dry skin present. No ulcer or blister.      Toenail Condition: Right toenails are normal.      Left foot:      Protective Sensation: 9 sites tested.  9 sites sensed.      Skin integrity: Skin integrity normal. Callus and dry skin present. No ulcer or blister.      Toenail Condition: Left toenails are normal.      Comments: Diabetic Foot Exam Performed and Monofilament Test Performed     Lymphadenopathy:      Cervical: No cervical adenopathy.   Skin:     General: Skin is warm and dry.   Neurological:      Mental Status: He is alert and oriented to person, place, and time.   Psychiatric:         Mood and Affect: Mood and affect normal.         Behavior: Behavior normal.         Thought Content: Thought content normal.         Judgment: Judgment normal.            Result Review :  The following data was reviewed by: Kim PARKS  Vessels, APRN on 03/06/2025:    Common labs          11/7/2024    16:01 12/4/2024    13:36 2/10/2025    15:51   Common Labs   Glucose 75   135     125    BUN 26   20     20    Creatinine 1.91   1.98     1.88    Sodium 143   139     139    Potassium 4.9   4.9     4.8    Chloride 105   100     102    Calcium 9.1   9.4     9.3    Albumin 3.9   3.7     3.8    Total Bilirubin   0.6    Alkaline Phosphatase   102    AST (SGOT)   116    ALT (SGPT)   119    WBC 6.44  6.74  5.15     5.47    Hemoglobin 12.9  13.2  13.1     12.9    Hematocrit 37.4  38.4  39.7     39.6    Platelets 217  223  214     211      Lipid Panel          9/5/2024    11:17 10/7/2024    13:21   Lipid Panel   Total Cholesterol 171  162    Triglycerides 113  134    HDL Cholesterol 64  62    VLDL Cholesterol 20  23    LDL Cholesterol  87  77    LDL/HDL Ratio 1.32  1.18      TSH          9/5/2024    11:17   TSH   TSH 2.890      Urine Drug Screen - Urine, Clean Catch (06/05/2024 10:26)     PSA Screen (02/26/2024 10:20)     XR Chest 2 View    Result Date: 2/10/2025  Impression: No active disease Electronically Signed: Ramone Marin MD  2/10/2025 3:20 PM EST  Workstation ID: [id number]      Procedures        Assessment and Plan   Diagnoses and all orders for this visit:    1. Type 2 diabetes mellitus with stage 3a chronic kidney disease, without long-term current use of insulin (Primary)  -     CBC Auto Differential; Future  -     Comprehensive Metabolic Panel; Future  -     Hemoglobin A1c; Future  -     Lipid Panel; Future  -     TSH+Free T4; Future  -     Microalbumin / Creatinine Urine Ratio - Urine, Clean Catch; Future    2. Encounter for diabetic foot exam    3. Callus of foot    4. Hyperlipemia, mixed  -     Comprehensive Metabolic Panel; Future  -     Lipid Panel; Future    5. Peripheral vascular disease with claudication  -     cilostazol (PLETAL) 100 MG tablet; Take 1 tablet by mouth 2 (Two) Times a Day.  Dispense: 180 tablet; Refill: 1    6. DISH (diffuse  idiopathic skeletal hyperostosis)    7. Musculoskeletal pain    8. High risk medication use    9. Stage 3a chronic kidney disease    10. Prostate cancer screening  -     PSA Screen; Future        Assessment & Plan  1. Recent Bronchitis/URI.  He reports significant improvement in breathing following a steroid shot. No current wheezing, shortness of breath, or abnormal sputum production. He was previously prescribed Breztri inhaler but did not use it due to cost.    2. Pain management.  He is currently taking tramadol, with a recent refill provided by Rubina. He takes two tablets in the morning and two at night but sometimes reduces the dose to one tablet due to constipation. He uses over-the-counter stool softeners as needed.  His last refill was picked up at the pharmacy on 2/26/2025.  He will contact the office for a refill on/around 3/26/2025 at the earliest.    3. Claudication.  He is taking cilostazol and has one bottle left. A refill will be placed on hold to ensure he does not run out.    4. Diabetes mellitus.  He is taking Jardiance 10 mg once a day, which has been effective in managing his blood sugar levels. He is receiving this medication through patient assistance.    5. Hypertension.  He is on amlodipine, carvedilol, and losartan for blood pressure management.    6. Hyperlipidemia.  He is taking atorvastatin for cholesterol management.    7. Cardiovascular disease.  He is on Plavix for cardiovascular health. He reports no chest pain while using a nitroglycerin patch, which he finds helpful.    8. Elevated liver enzymes.  Previous labs indicated elevated liver enzymes. Fasting labs will be conducted to recheck liver function.    9. Decreased kidney function.  Previous labs indicated decreased kidney function. He has an upcoming appointment with his nephrologist on 03/27/2025.    10. Calluses.  He has calluses on his feet. He is advised to monitor them closely to prevent ulcer formation. After showering,  he should gently rub a pumice stone over the calluses.  I did offer to refer him to podiatry for maintenance, but at this time he declines.  His wife will let me know if he has a change of heart.                FOLLOW UP  Return in about 3 months (around 6/6/2025) for Next scheduled follow up (pain mangement follow up).    Patient was given instructions and counseling regarding his condition or for health maintenance advice. Please see specific information pulled into the AVS if appropriate.       RUSS Herrera  03/06/25  14:11 EST    CURRENT & DISCONTINUED MEDICATIONS  Current Outpatient Medications   Medication Instructions    amLODIPine (NORVASC) 5 mg, Oral, Daily    aspirin 81 mg, Daily    atorvastatin (LIPITOR) 40 mg, Oral, Nightly    carvedilol (COREG) 12.5 mg, Oral, Every 12 Hours Scheduled    cilostazol (PLETAL) 100 mg, Oral, 2 Times Daily    clopidogrel (PLAVIX) 75 mg, Oral, Daily    glucose blood (True Metrix Blood Glucose Test) test strip 1 each, Other, Daily, Use as instructed    Jardiance 10 mg    losartan (COZAAR) 100 mg, Oral, Daily    multivitamin with minerals tablet tablet 1 tablet, Daily    nitroglycerin (NITRODUR) 0.2 MG/HR patch 1 patch, Transdermal, Daily    nitroglycerin (NITROSTAT) 0.3 MG SL tablet 1 under the tongue as needed for angina, may repeat q5mins for up three doses    traMADol (ULTRAM) 100 mg, Oral, 2 Times Daily PRN       Medications Discontinued During This Encounter   Medication Reason    azithromycin (Zithromax) 250 MG tablet *Therapy completed    Budeson-Glycopyrrol-Formoterol (BREZTRI) 160-9-4.8 MCG/ACT aerosol inhaler Cost of medication    cilostazol (PLETAL) 100 MG tablet Reorder        Patient or patient representative verbalized consent for the use of Ambient Listening during the visit with  RUSS Herrera for chart documentation. 3/6/2025  13:19 EST

## 2025-03-10 ENCOUNTER — CLINICAL SUPPORT (OUTPATIENT)
Dept: FAMILY MEDICINE CLINIC | Facility: CLINIC | Age: 85
End: 2025-03-10
Payer: MEDICARE

## 2025-03-10 DIAGNOSIS — E78.2 HYPERLIPEMIA, MIXED: ICD-10-CM

## 2025-03-10 DIAGNOSIS — Z12.5 PROSTATE CANCER SCREENING: ICD-10-CM

## 2025-03-10 DIAGNOSIS — E11.22 TYPE 2 DIABETES MELLITUS WITH STAGE 3A CHRONIC KIDNEY DISEASE, WITHOUT LONG-TERM CURRENT USE OF INSULIN: ICD-10-CM

## 2025-03-10 DIAGNOSIS — N18.31 TYPE 2 DIABETES MELLITUS WITH STAGE 3A CHRONIC KIDNEY DISEASE, WITHOUT LONG-TERM CURRENT USE OF INSULIN: ICD-10-CM

## 2025-03-10 LAB
ALBUMIN SERPL-MCNC: 3.9 G/DL (ref 3.5–5.2)
ALBUMIN UR-MCNC: 5.8 MG/DL
ALBUMIN/GLOB SERPL: 1.4 G/DL
ALP SERPL-CCNC: 81 U/L (ref 39–117)
ALT SERPL W P-5'-P-CCNC: 8 U/L (ref 1–41)
ANION GAP SERPL CALCULATED.3IONS-SCNC: 8.4 MMOL/L (ref 5–15)
AST SERPL-CCNC: 13 U/L (ref 1–40)
BASOPHILS # BLD AUTO: 0.04 10*3/MM3 (ref 0–0.2)
BASOPHILS NFR BLD AUTO: 0.7 % (ref 0–1.5)
BILIRUB SERPL-MCNC: 0.7 MG/DL (ref 0–1.2)
BUN SERPL-MCNC: 18 MG/DL (ref 8–23)
BUN/CREAT SERPL: 9.5 (ref 7–25)
CALCIUM SPEC-SCNC: 9.5 MG/DL (ref 8.6–10.5)
CHLORIDE SERPL-SCNC: 100 MMOL/L (ref 98–107)
CHOLEST SERPL-MCNC: 154 MG/DL (ref 0–200)
CO2 SERPL-SCNC: 29.6 MMOL/L (ref 22–29)
CREAT SERPL-MCNC: 1.9 MG/DL (ref 0.76–1.27)
CREAT UR-MCNC: 116.2 MG/DL
DEPRECATED RDW RBC AUTO: 46 FL (ref 37–54)
EGFRCR SERPLBLD CKD-EPI 2021: 34.6 ML/MIN/1.73
EOSINOPHIL # BLD AUTO: 0.34 10*3/MM3 (ref 0–0.4)
EOSINOPHIL NFR BLD AUTO: 5.7 % (ref 0.3–6.2)
ERYTHROCYTE [DISTWIDTH] IN BLOOD BY AUTOMATED COUNT: 12.5 % (ref 12.3–15.4)
GLOBULIN UR ELPH-MCNC: 2.8 GM/DL
GLUCOSE SERPL-MCNC: 113 MG/DL (ref 65–99)
HBA1C MFR BLD: 6 % (ref 4.8–5.6)
HCT VFR BLD AUTO: 41.1 % (ref 37.5–51)
HDLC SERPL-MCNC: 69 MG/DL (ref 40–60)
HGB BLD-MCNC: 13.8 G/DL (ref 13–17.7)
IMM GRANULOCYTES # BLD AUTO: 0.01 10*3/MM3 (ref 0–0.05)
IMM GRANULOCYTES NFR BLD AUTO: 0.2 % (ref 0–0.5)
LDLC SERPL CALC-MCNC: 70 MG/DL (ref 0–100)
LDLC/HDLC SERPL: 1 {RATIO}
LYMPHOCYTES # BLD AUTO: 2.36 10*3/MM3 (ref 0.7–3.1)
LYMPHOCYTES NFR BLD AUTO: 39.6 % (ref 19.6–45.3)
MCH RBC QN AUTO: 33.6 PG (ref 26.6–33)
MCHC RBC AUTO-ENTMCNC: 33.6 G/DL (ref 31.5–35.7)
MCV RBC AUTO: 100 FL (ref 79–97)
MICROALBUMIN/CREAT UR: 49.9 MG/G (ref 0–29)
MONOCYTES # BLD AUTO: 0.49 10*3/MM3 (ref 0.1–0.9)
MONOCYTES NFR BLD AUTO: 8.2 % (ref 5–12)
NEUTROPHILS NFR BLD AUTO: 2.72 10*3/MM3 (ref 1.7–7)
NEUTROPHILS NFR BLD AUTO: 45.6 % (ref 42.7–76)
NRBC BLD AUTO-RTO: 0 /100 WBC (ref 0–0.2)
PLATELET # BLD AUTO: 222 10*3/MM3 (ref 140–450)
PMV BLD AUTO: 9.1 FL (ref 6–12)
POTASSIUM SERPL-SCNC: 4.6 MMOL/L (ref 3.5–5.2)
PROT SERPL-MCNC: 6.7 G/DL (ref 6–8.5)
PSA SERPL-MCNC: 0.02 NG/ML (ref 0–4)
RBC # BLD AUTO: 4.11 10*6/MM3 (ref 4.14–5.8)
SODIUM SERPL-SCNC: 138 MMOL/L (ref 136–145)
T4 FREE SERPL-MCNC: 1.24 NG/DL (ref 0.92–1.68)
TRIGL SERPL-MCNC: 79 MG/DL (ref 0–150)
TSH SERPL DL<=0.05 MIU/L-ACNC: 4.82 UIU/ML (ref 0.27–4.2)
VLDLC SERPL-MCNC: 15 MG/DL (ref 5–40)
WBC NRBC COR # BLD AUTO: 5.96 10*3/MM3 (ref 3.4–10.8)

## 2025-03-10 PROCEDURE — 84439 ASSAY OF FREE THYROXINE: CPT | Performed by: NURSE PRACTITIONER

## 2025-03-10 PROCEDURE — 36415 COLL VENOUS BLD VENIPUNCTURE: CPT | Performed by: NURSE PRACTITIONER

## 2025-03-10 PROCEDURE — 83036 HEMOGLOBIN GLYCOSYLATED A1C: CPT | Performed by: NURSE PRACTITIONER

## 2025-03-10 PROCEDURE — 82570 ASSAY OF URINE CREATININE: CPT | Performed by: NURSE PRACTITIONER

## 2025-03-10 PROCEDURE — 82043 UR ALBUMIN QUANTITATIVE: CPT | Performed by: NURSE PRACTITIONER

## 2025-03-10 PROCEDURE — G0103 PSA SCREENING: HCPCS | Performed by: NURSE PRACTITIONER

## 2025-03-10 PROCEDURE — 85025 COMPLETE CBC W/AUTO DIFF WBC: CPT | Performed by: NURSE PRACTITIONER

## 2025-03-10 PROCEDURE — 80061 LIPID PANEL: CPT | Performed by: NURSE PRACTITIONER

## 2025-03-10 PROCEDURE — 80053 COMPREHEN METABOLIC PANEL: CPT | Performed by: NURSE PRACTITIONER

## 2025-03-10 PROCEDURE — 84443 ASSAY THYROID STIM HORMONE: CPT | Performed by: NURSE PRACTITIONER

## 2025-03-10 NOTE — PROGRESS NOTES
..  Venipuncture Blood Specimen Collection  Venipuncture performed in Lt arm by Lillie Tinsley with good hemostasis. Patient tolerated the procedure well without complications.   03/10/25   Jo Kingsley MA

## 2025-03-11 ENCOUNTER — RESULTS FOLLOW-UP (OUTPATIENT)
Dept: FAMILY MEDICINE CLINIC | Facility: CLINIC | Age: 85
End: 2025-03-11
Payer: MEDICARE

## 2025-03-28 DIAGNOSIS — N18.31 STAGE 3A CHRONIC KIDNEY DISEASE: ICD-10-CM

## 2025-03-28 DIAGNOSIS — Z79.899 HIGH RISK MEDICATION USE: ICD-10-CM

## 2025-03-28 DIAGNOSIS — M48.10 DISH (DIFFUSE IDIOPATHIC SKELETAL HYPEROSTOSIS): ICD-10-CM

## 2025-03-28 DIAGNOSIS — M79.18 MUSCULOSKELETAL PAIN: ICD-10-CM

## 2025-03-28 RX ORDER — TRAMADOL HYDROCHLORIDE 50 MG/1
100 TABLET ORAL 2 TIMES DAILY PRN
Qty: 120 TABLET | Refills: 2 | Status: SHIPPED | OUTPATIENT
Start: 2025-03-28

## 2025-04-15 NOTE — PROGRESS NOTES
[facility]  Cardiology progress Note    Patient Name: Rudy Moore  : 1940    CHIEF COMPLAINT  CAD        Subjective   Subjective     HISTORY OF PRESENT ILLNESS    Rudy Moore is a 84 y.o. male with CAD.  No chest pain.    REVIEW OF SYSTEMS    Constitutional:    No fever, no weight loss  Skin:     No rash  Otolaryngeal:    No difficulty swallowing  Cardiovascular: See HPI.  Pulmonary:    No cough, no sputum production    Personal History     Social History:    reports that he quit smoking about 24 years ago. His smoking use included cigarettes. He started smoking about 66 years ago. He has a 84 pack-year smoking history. He has been exposed to tobacco smoke. He has never used smokeless tobacco. He reports current alcohol use. He reports that he does not use drugs.    Home Medications:  Current Outpatient Medications on File Prior to Visit   Medication Sig    amLODIPine (NORVASC) 5 MG tablet Take 1 tablet by mouth Daily.    aspirin 81 MG EC tablet Take 1 tablet by mouth Daily.    atorvastatin (LIPITOR) 40 MG tablet Take 1 tablet by mouth Every Night.    carvedilol (COREG) 12.5 MG tablet Take 1 tablet by mouth Every 12 (Twelve) Hours.    cilostazol (PLETAL) 100 MG tablet Take 1 tablet by mouth 2 (Two) Times a Day.    clopidogrel (PLAVIX) 75 MG tablet TAKE 1 TABLET BY MOUTH DAILY    glucose blood (True Metrix Blood Glucose Test) test strip 1 each by Other route Daily. Use as instructed    Jardiance 10 MG tablet tablet Take 1 tablet by mouth.    losartan (Cozaar) 100 MG tablet Take 1 tablet by mouth Daily for 360 days.    multivitamin with minerals tablet tablet Take 1 tablet by mouth Daily.    nitroglycerin (NITRODUR) 0.2 MG/HR patch PLACE 1 PATCH ON THE SKIN AS DIRECTED BY PROVIDER DAILY    nitroglycerin (NITROSTAT) 0.3 MG SL tablet 1 under the tongue as needed for angina, may repeat q5mins for up three doses    traMADol (ULTRAM) 50 MG tablet TAKE 2 TABLETS BY MOUTH TWICE DAILY  AS NEEDED FOR MODERATE PAIN     No current facility-administered medications on file prior to visit.       Past Medical History:   Diagnosis Date    CAD S/P percutaneous coronary angioplasty 05/05/2022    Cataract     Diabetes mellitus, type 2     Essential hypertension 07/23/2020    GERD (gastroesophageal reflux disease)     History of coronary angioplasty with insertion of stent 5/5/2022    Hyperlipidemia     Kidney stones     Peripheral arterial disease 07/23/2020    Seasonal allergies        Allergies:  No Known Allergies    Objective    Objective       Vitals:   Heart Rate:  [68] 68  BP: (164)/(84) 164/84  Body mass index is 27.56 kg/m².     PHYSICAL EXAM:    General Appearance:   well developed  well nourished  HENT:   oropharynx moist  lips not cyanotic  Neck:  thyroid not enlarged  supple  Respiratory:  no respiratory distress  normal breath sounds  no rales  Cardiovascular:  no jugular venous distention  regular rhythm  apical impulse normal  S1 normal, S2 normal  no S3, no S4   no murmur  no rub, no thrill  carotid pulses normal; no bruit  pedal pulses normal  lower extremity edema: none    Skin:   warm, dry  Psychiatric:  judgement and insight appropriate  normal mood and affect        Result Review:  I have personally reviewed the available results from  [x]  Laboratory  [x]  EKG  [x]  Cardiology  [x]  Medications  [x]  Old records  []  Other:     Procedures  Lab Results   Component Value Date    CHOL 154 03/10/2025    CHOL 162 10/07/2024    CHOL 171 09/05/2024     Lab Results   Component Value Date    TRIG 79 03/10/2025    TRIG 134 10/07/2024    TRIG 113 09/05/2024     Lab Results   Component Value Date    HDL 69 (H) 03/10/2025    HDL 62 (H) 10/07/2024    HDL 64 (H) 09/05/2024     Lab Results   Component Value Date    LDL 70 03/10/2025    LDL 77 10/07/2024    LDL 87 09/05/2024     Lab Results   Component Value Date    VLDL 15 03/10/2025    VLDL 23 10/07/2024    VLDL 20 09/05/2024     Results for orders  placed during the hospital encounter of 24    Adult Transthoracic Echo Complete W/ Cont if Necessary Per Protocol    Interpretation Summary  Normal left ventricular systolic function.  Fibrocalcific aortic valve.  Moderate to moderately severe aortic stenosis with a mean gradient of 24 mm.  No change from previous echo.  Trace mitral regurgitation.   Results for orders placed during the hospital encounter of 24    Cardiac Catheterization/Vascular Study    Conclusion  [facility]  CARDIAC CATHETERIZATION PROCEDURE REPORT    Patient: Rudy Moore  : 1940  MRN: [id number]    Procedure Date:  24    Referring Physician:  Fabiano Randolph MD    Interventional Cardiologist:  Cm Ryan MD    Indication:  Non-STEMI  Known coronary artery disease, previous multivessel angioplasty    Clinical Presentation:  Mr. Moore underwent multivessel angioplasty stent placement 20 years back.  Recently, he is experiencing chest pain with activity.  He had a SPECT stress test done last year which showed inferior wall infarct with mild jailene-infarct ischemia.  Medical management was recommended.  He came to the hospital yesterday because of an episode of chest pain.  He was started on heparin infusion.  Cardiac markers including troponins were mildly elevated.  Today, he was brought to the Cath Lab to identify ischemic culprits    Procedure performed:  Left heart catheterization  Selective coronary angiography      Access Sites:  Right radial artery  Right common femoral artery    Findings:  1. Coronary Artery Anatomy:  Dominance: Right  Left Main: Normal with no stenosis.  Left Anterior Descending artery: Medium caliber vessel giving rise to various diagonal and septal  branches.  Entire left and descending artery and branches are free of any stenosis other than luminal irregularities.  Left circumflex Artery: Medium caliber, nondominant vessel.  Very proximal left  circumflex artery has an eccentric lesion with some calcification.  The lesion is angiographically 50 to 60% severity in 1 view.  A stent is visible in proximal left circumflex artery with 30 to 40% in-stent restenosis.  OM 1 branch is a smaller vessel with no major lesions.  There is another lesion in the mid left circumflex artery which is angiographically 50 to 60% severity.  Terminal OM branches free of any stenosis.  Right Coronary Artery: Right coronary artery is 100% occluded in its ostium.  It is a chronic total occlusion.  A stent is visible in the ostial proximal RCA and also in the distal RCA.  Good retrograde collateral filling of right coronary artery is noted on injection of the left coronary system.  There are collaterals, mostly from LAD artery and septal perforators and fills retrogradely up to the proximal stent.    2. Hemodynamics:  The opening aortic pressure is 170/68 with a mean of 106 mmHg.  The aortic valve was not crossed during the study.    3. Left Ventriculogram: Not performed      Conclusions:  100% chronic total occlusion of right coronary artery, inserted previously placed stent with left-to-right collateral filling of mid to distal right coronary artery.  Patent stent in proximal left circumflex artery with 30 to 40% in-stent restenosis.  Nonobstructive and borderline lesions noted in proximal and mid left circumflex artery.    Recommendations:  Continue medical management, will initiate dual antiplatelet therapy and maximize antianginal medications.  Continue gentle IV fluid hydration for the rest of the day due to chronic kidney disease    Procedure Details:  Informed consent was obtained with an explanation of the risks, benefits and alternatives of the procedure. The patient was brought to the Cardiac Catheterization Laboratory and was prepped and draped in a standard sterile fashion. Moderate sedation with Fentanyl and Versed was administered by the circulating nurse. Lidocaine  2% was used to anesthetize the right radial artery and a 5/6 Slender sheath was placed.  We could not advance any wire beyond the elbow region.  An angled glide wire was curling around.  A limited angiogram of the right radial artery was performed which showed a 360 degree loop of the proximal radial artery going into the brachial.  We are unable to navigate wire into the brachial.  At this time the sheath was taken out and TR band was applied for hemostasis of the radial arteriotomy site.    Next, right inguinal region was prepared, cleaned and draped in usual sterile fashion.  2% lidocaine was used to anesthetize the area.  Under ultrasound guidance, right common femoral artery was cannulated and a 5 Cameroonian arterial sheath was advanced by modified Seldinger technique.  A 4 Cameroonian JR4 catheter was used to engage the ostium of right coronary artery..  4 Cam[place]onian JL 4 catheter was used to engage the ostium of left main coronary artery.  Diagnostic angiogram was performed with injection of nonionic contrast in all appropriate projections.  Aortic valve was not crossed during the study.  All the catheters images were done over the long exchange length wire.  At the end of the procedure, the likely sheath was pulled and TR band was applied for hemostasis.  Patient tolerated procedure well without any complications.  The results of the test were explained in detail with the patient and family members.      Cumulative fluoroscopy time: 8 min    Cumulative air kerma: 525 mGy    Total amount of contrast used: 47 ml of Isovue      Complications:  None.    Estimated Blood Loss:  Minimal.    Cm Ryan MD    05/24/24  11:41 EDT        Impression/Plan:   1. Coronary disease s/p PTCA/stent/atypical chest pain: Recent cardiac cath showed a totally occluded right coronary artery with left-to-right collaterals.  Continue nitroglycerin patch 0.2 mg once a day.  Continue aspirin 81 mg once a day.  Continue carvedilol 12.5 mg twice  a day.    2.  Essential hypertension Uncontrolled: Continue losartan 100 mg once a day.  Continue carvedilol 12.5 mg twice a day.  Continue amlodipine 5 mg once a day.  Monitor blood pressure regularly.  3.  Mixed hyperlipidemia: Continue Lipitor 40 mg once a day.  Monitor lipid and hepatic profile.  4.  Moderately severe aortic stenosis: Repeat echocardiogram in a month               Fabiano Randolph MD   04/17/25   13:39 EDT

## 2025-04-17 ENCOUNTER — OFFICE VISIT (OUTPATIENT)
Dept: CARDIOLOGY | Facility: CLINIC | Age: 85
End: 2025-04-17
Payer: MEDICARE

## 2025-04-17 VITALS
BODY MASS INDEX: 27.64 KG/M2 | HEIGHT: 69 IN | DIASTOLIC BLOOD PRESSURE: 84 MMHG | WEIGHT: 186.6 LBS | SYSTOLIC BLOOD PRESSURE: 164 MMHG | HEART RATE: 68 BPM

## 2025-04-17 DIAGNOSIS — Z95.5 HISTORY OF CORONARY ANGIOPLASTY WITH INSERTION OF STENT: ICD-10-CM

## 2025-04-17 DIAGNOSIS — I35.0 AORTIC STENOSIS, MODERATE: ICD-10-CM

## 2025-04-17 DIAGNOSIS — I25.10 CORONARY ARTERY DISEASE INVOLVING NATIVE CORONARY ARTERY OF NATIVE HEART WITHOUT ANGINA PECTORIS: ICD-10-CM

## 2025-04-17 DIAGNOSIS — E78.2 HYPERLIPEMIA, MIXED: ICD-10-CM

## 2025-04-17 DIAGNOSIS — I10 HYPERTENSION, ESSENTIAL: Primary | ICD-10-CM

## 2025-04-17 PROCEDURE — 3077F SYST BP >= 140 MM HG: CPT | Performed by: SPECIALIST

## 2025-04-17 PROCEDURE — 3079F DIAST BP 80-89 MM HG: CPT | Performed by: SPECIALIST

## 2025-04-17 PROCEDURE — 99214 OFFICE O/P EST MOD 30 MIN: CPT | Performed by: SPECIALIST

## 2025-04-17 RX ORDER — ASPIRIN 81 MG/1
81 TABLET ORAL DAILY
Qty: 90 TABLET | Refills: 6 | Status: SHIPPED | OUTPATIENT
Start: 2025-04-17

## 2025-04-17 RX ORDER — ATORVASTATIN CALCIUM 40 MG/1
40 TABLET, FILM COATED ORAL NIGHTLY
Qty: 90 TABLET | Refills: 3 | Status: SHIPPED | OUTPATIENT
Start: 2025-04-17

## 2025-04-17 RX ORDER — AMLODIPINE BESYLATE 5 MG/1
5 TABLET ORAL DAILY
Qty: 90 TABLET | Refills: 3 | Status: SHIPPED | OUTPATIENT
Start: 2025-04-17

## 2025-04-17 RX ORDER — CLOPIDOGREL BISULFATE 75 MG/1
75 TABLET ORAL DAILY
Qty: 90 TABLET | Refills: 3 | Status: SHIPPED | OUTPATIENT
Start: 2025-04-17 | End: 2026-04-17

## 2025-04-17 RX ORDER — CARVEDILOL 12.5 MG/1
12.5 TABLET ORAL EVERY 12 HOURS SCHEDULED
Qty: 180 TABLET | Refills: 3 | Status: SHIPPED | OUTPATIENT
Start: 2025-04-17 | End: 2026-04-17

## 2025-04-17 RX ORDER — LOSARTAN POTASSIUM 100 MG/1
100 TABLET ORAL DAILY
Qty: 90 TABLET | Refills: 3 | Status: SHIPPED | OUTPATIENT
Start: 2025-04-17 | End: 2026-04-12

## 2025-04-21 DIAGNOSIS — E11.22 TYPE 2 DIABETES MELLITUS WITH STAGE 3A CHRONIC KIDNEY DISEASE, WITHOUT LONG-TERM CURRENT USE OF INSULIN: ICD-10-CM

## 2025-04-21 DIAGNOSIS — N18.31 TYPE 2 DIABETES MELLITUS WITH STAGE 3A CHRONIC KIDNEY DISEASE, WITHOUT LONG-TERM CURRENT USE OF INSULIN: ICD-10-CM

## 2025-04-21 RX ORDER — CALCIUM CITRATE/VITAMIN D3 200MG-6.25
1 TABLET ORAL DAILY
Qty: 100 EACH | Refills: 3 | Status: SHIPPED | OUTPATIENT
Start: 2025-04-21

## 2025-05-16 ENCOUNTER — HOSPITAL ENCOUNTER (OUTPATIENT)
Facility: HOSPITAL | Age: 85
Discharge: HOME | End: 2025-05-16
Payer: MEDICARE

## 2025-05-16 DIAGNOSIS — I35.0 AORTIC STENOSIS, MODERATE: ICD-10-CM

## 2025-05-16 PROCEDURE — 93306 TTE W/DOPPLER COMPLETE: CPT

## 2025-05-19 ENCOUNTER — RESULTS FOLLOW-UP (OUTPATIENT)
Dept: CARDIOLOGY | Facility: CLINIC | Age: 85
End: 2025-05-19
Payer: MEDICARE

## 2025-05-19 DIAGNOSIS — I35.0 AORTIC STENOSIS, MODERATE: Primary | ICD-10-CM

## 2025-05-19 LAB
AORTIC DIMENSIONLESS INDEX: 0.27 (DI)
ASCENDING AORTA: 3 CM
AV MEAN PRESS GRAD SYS DOP V1V2: 25.8 MMHG
AV VMAX SYS DOP: 319 CM/SEC
BH CV ECHO MEAS - AO MAX PG: 40.7 MMHG
BH CV ECHO MEAS - AO ROOT DIAM: 3.4 CM
BH CV ECHO MEAS - AO V2 VTI: 78.8 CM
BH CV ECHO MEAS - AVA(I,D): 1.02 CM2
BH CV ECHO MEAS - EDV(MOD-SP2): 119.3 ML
BH CV ECHO MEAS - EDV(MOD-SP4): 112.8 ML
BH CV ECHO MEAS - EF(MOD-SP2): 50.6 %
BH CV ECHO MEAS - EF(MOD-SP4): 52.1 %
BH CV ECHO MEAS - ESV(MOD-SP2): 58.9 ML
BH CV ECHO MEAS - IVS/LVPW: 1.22 CM
BH CV ECHO MEAS - IVSD: 1.1 CM
BH CV ECHO MEAS - LA DIMENSION: 3.7 CM
BH CV ECHO MEAS - LAT PEAK E' VEL: 7.5 CM/SEC
BH CV ECHO MEAS - LV DIASTOLIC VOL/BSA (35-75): 56.4 CM2
BH CV ECHO MEAS - LV MAX PG: 2.6 MMHG
BH CV ECHO MEAS - LV MEAN PG: 1.4 MMHG
BH CV ECHO MEAS - LV V1 MAX: 80 CM/SEC
BH CV ECHO MEAS - LV V1 VTI: 21.1 CM
BH CV ECHO MEAS - LVIDD: 5.2 CM
BH CV ECHO MEAS - LVIDS: 3.1 CM
BH CV ECHO MEAS - LVOT AREA: 3.8 CM2
BH CV ECHO MEAS - LVOT DIAM: 2.2 CM
BH CV ECHO MEAS - LVPWD: 0.9 CM
BH CV ECHO MEAS - MED PEAK E' VEL: 8.4 CM/SEC
BH CV ECHO MEAS - MV A MAX VEL: 138.4 CM/SEC
BH CV ECHO MEAS - MV E MAX VEL: 109.8 CM/SEC
BH CV ECHO MEAS - MV E/A: 0.79
BH CV ECHO MEAS - PA V2 MAX: 70 CM/SEC
BH CV ECHO MEAS - SV(LVOT): 80.2 ML
BH CV ECHO MEAS - SV(MOD-SP2): 60.4 ML
BH CV ECHO MEAS - SVI(LVOT): 40.1 ML/M2
BH CV ECHO MEAS - SVI(MOD-SP2): 30.2 ML/M2
BH CV ECHO MEAS - TAPSE (>1.6): 2.43 CM
BH CV ECHO MEASUREMENTS AVERAGE E/E' RATIO: 13.81
BH CV XLRA - TDI S': 13.9 CM/SEC
IVRT: 83 MS
LV EF BIPLANE MOD: 51.6 %

## 2025-05-19 RX ORDER — NITROGLYCERIN 40 MG/1
1 PATCH TRANSDERMAL DAILY
Qty: 90 PATCH | Refills: 0 | Status: SHIPPED | OUTPATIENT
Start: 2025-05-19

## 2025-06-06 ENCOUNTER — OFFICE VISIT (OUTPATIENT)
Dept: FAMILY MEDICINE CLINIC | Facility: CLINIC | Age: 85
End: 2025-06-06
Payer: MEDICARE

## 2025-06-06 VITALS
OXYGEN SATURATION: 96 % | DIASTOLIC BLOOD PRESSURE: 60 MMHG | BODY MASS INDEX: 27.91 KG/M2 | SYSTOLIC BLOOD PRESSURE: 102 MMHG | TEMPERATURE: 98.4 F | HEART RATE: 74 BPM | WEIGHT: 189 LBS

## 2025-06-06 DIAGNOSIS — I21.4 NSTEMI (NON-ST ELEVATED MYOCARDIAL INFARCTION): ICD-10-CM

## 2025-06-06 DIAGNOSIS — Z79.899 HIGH RISK MEDICATION USE: ICD-10-CM

## 2025-06-06 DIAGNOSIS — Z79.899 CONTROLLED SUBSTANCE AGREEMENT SIGNED: ICD-10-CM

## 2025-06-06 DIAGNOSIS — M48.10 DISH (DIFFUSE IDIOPATHIC SKELETAL HYPEROSTOSIS): Primary | ICD-10-CM

## 2025-06-06 DIAGNOSIS — N18.31 STAGE 3A CHRONIC KIDNEY DISEASE: ICD-10-CM

## 2025-06-06 DIAGNOSIS — M79.18 MUSCULOSKELETAL PAIN: ICD-10-CM

## 2025-06-06 LAB
AMPHET+METHAMPHET UR QL: NEGATIVE
AMPHETAMINES UR QL: NEGATIVE
BARBITURATES UR QL SCN: NEGATIVE
BENZODIAZ UR QL SCN: NEGATIVE
BUPRENORPHINE SERPL-MCNC: NEGATIVE NG/ML
CANNABINOIDS SERPL QL: NEGATIVE
COCAINE UR QL: NEGATIVE
FENTANYL UR-MCNC: NEGATIVE NG/ML
METHADONE UR QL SCN: NEGATIVE
OPIATES UR QL: NEGATIVE
OXYCODONE UR QL SCN: NEGATIVE
PCP UR QL SCN: NEGATIVE
TRICYCLICS UR QL SCN: NEGATIVE

## 2025-06-06 PROCEDURE — 80307 DRUG TEST PRSMV CHEM ANLYZR: CPT | Performed by: NURSE PRACTITIONER

## 2025-06-06 RX ORDER — NITROGLYCERIN 0.3 MG/1
TABLET SUBLINGUAL
Qty: 100 TABLET | Refills: 0 | Status: SHIPPED | OUTPATIENT
Start: 2025-06-06

## 2025-06-06 NOTE — PROGRESS NOTES
Chief Complaint  Diabetes (Medication refills/labs )    History of Present Illness  Rudy Moore is a 84 y.o. male who presents to [facility] with a past medical history of    Past Medical History:   Diagnosis Date    CAD S/P percutaneous coronary angioplasty 2022    Cataract     Diabetes mellitus, type 2     Essential hypertension 2020    GERD (gastroesophageal reflux disease)     History of coronary angioplasty with insertion of stent 2022    Hyperlipidemia     Kidney stones     Peripheral arterial disease 2020    Seasonal allergies        History of Present Illness  The patient is an 84-year-old male who presents to the office today for a 3-month follow-up appointment.    He reports that his pain levels have remained consistent, rating it as a 5 on a scale of 1 to 10. He has been adhering to a regimen of tramadol, taking two tablets twice daily. Several falls occurred last year, but his gait and balance have been progressively improving over the past several months and he currently denies any recent falls. The falls seemingly occurred following his hospital admission for MI just over a year ago.     He has been using Nitrostat sublingual tablets sparingly, with the most recent use being two nights ago. He reports that the medication effectively alleviated his pain within 10 minutes. He has a history of a heart murmur, which was diagnosed approximately 50 years ago. He is requesting a refill of Nitrostat as his current bottle is .       Objective   Vital Signs:   Vitals:    25 1039   BP: 102/60   Pulse: 74   Temp: 98.4 °F (36.9 °C)   SpO2: 96%   Weight: 85.7 kg (189 lb)     Body mass index is 27.91 kg/m².    Wt Readings from Last 3 Encounters:   25 85.7 kg (189 lb)   25 84.6 kg (186 lb 9.6 oz)   25 87.4 kg (192 lb 9.6 oz)     BP Readings from Last 3 Encounters:   25 102/60   25 164/84   25 130/70        Health Maintenance   Topic Date Due    ZOSTER VACCINE (2 of 3) 04/12/2010    DIABETIC EYE EXAM  05/01/2024    ANNUAL WELLNESS VISIT  09/05/2025    COVID-19 Vaccine (6 - 2024-25 season) 02/10/2026 (Originally 9/1/2024)    INFLUENZA VACCINE  07/01/2025    HEMOGLOBIN A1C  09/10/2025    DIABETIC FOOT EXAM  03/06/2026    LIPID PANEL  03/10/2026    URINE MICROALBUMIN-CREATININE RATIO (uACR)  03/10/2026    TDAP/TD VACCINES (2 - Td or Tdap) 01/27/2032    RSV Vaccine - Adults  Completed    Pneumococcal Vaccine 50+  Completed       Physical Exam  Vitals reviewed.   Constitutional:       General: He is not in acute distress.     Appearance: He is well-developed and overweight. He is not ill-appearing.   HENT:      Head: Normocephalic and atraumatic.   Eyes:      General: No scleral icterus.        Right eye: No discharge.         Left eye: No discharge.      Extraocular Movements: Extraocular movements intact.      Conjunctiva/sclera: Conjunctivae normal.   Neck:      Thyroid: No thyromegaly.      Vascular: No carotid bruit.      Trachea: Trachea normal.   Cardiovascular:      Rate and Rhythm: Normal rate and regular rhythm.      Pulses: Normal pulses.      Heart sounds: Murmur heard.      Systolic murmur is present with a grade of 3/6.   Pulmonary:      Effort: Pulmonary effort is normal.      Breath sounds: Normal breath sounds. No wheezing, rhonchi or rales.   Musculoskeletal:         General: Normal range of motion.      Cervical back: Normal range of motion and neck supple. No tenderness.   Lymphadenopathy:      Cervical: No cervical adenopathy.   Skin:     General: Skin is warm and dry.   Neurological:      Mental Status: He is alert and oriented to person, place, and time.   Psychiatric:         Mood and Affect: Mood and affect normal.         Behavior: Behavior normal.         Thought Content: Thought content normal.         Judgment: Judgment normal.            Result Review :  The following data was reviewed  by: Kim PARKS Vessels, APRN on 06/06/2025:    Common labs          12/4/2024    13:36 2/10/2025    15:51 3/10/2025    08:20   Common Labs   Glucose  135     125  113    BUN  20     20  18    Creatinine  1.98     1.88  1.90    Sodium  139     139  138    Potassium  4.9     4.8  4.6    Chloride  100     102  100    Calcium  9.4     9.3  9.5    Albumin  3.7     3.8  3.9    Total Bilirubin  0.6  0.7    Alkaline Phosphatase  102  81    AST (SGOT)  116  13    ALT (SGPT)  119  8    WBC 6.74  5.15     5.47  5.96    Hemoglobin 13.2  13.1     12.9  13.8    Hematocrit 38.4  39.7     39.6  41.1    Platelets 223  214     211  222    Total Cholesterol   154    Triglycerides   79    HDL Cholesterol   69    LDL Cholesterol    70    Hemoglobin A1C   6.00    Microalbumin, Urine   5.8    PSA   0.024      Urine Drug Screen - Urine, Clean Catch (06/05/2024 10:26)     CONTROLLED SUBSTANCE AGREEMENT - SCAN - CONTROLLED SUBSTANCE AGREEMENT, [facility], 06/05/2024 (06/05/2024)     Procedures        Assessment and Plan   Diagnoses and all orders for this visit:    1. DISH (diffuse idiopathic skeletal hyperostosis) (Primary)    2. Stage 3a chronic kidney disease    3. Musculoskeletal pain    4. High risk medication use  -     Urine Drug Screen - Urine, Clean Catch    5. Controlled substance agreement signed    6. NSTEMI (non-ST elevated myocardial infarction)  -     nitroglycerin (NITROSTAT) 0.3 MG SL tablet; 1 under the tongue as needed for angina, may repeat q5mins for up three doses  Dispense: 100 tablet; Refill: 0        Assessment & Plan  1. Pain management.  - Pain is stable, rated at 5/10.  - Currently taking tramadol 2 tablets twice a day.  - A urine drug screen will be conducted today as part of his controlled substance agreement.  - Will continue with the current tramadol regimen. If pain worsens or changes, adjustments to the medication will be considered.    2. Claudication.  - On Pletal (cilostazol) for claudication with  refills available until 2025.  - Should continue taking Pletal as prescribed.  - If symptoms worsen, he should notify the office.  - Specialists are managing other medications.    3. Medication management.  - Nitrostat sublingual pills have .  - A new prescription for Nitrostat sublingual pills has been issued to ensure efficacy.  - Should use them as needed for chest pain and report any issues/consistent use to cardiology.    4. Weight and heart murmur.  - Lost 11 pounds.  - Heart murmur is still present.  - Physical exam findings include stable heart and lungs.  - Continue monitoring weight and heart condition.      BMI is >= 25 and <30. (Overweight) The following options were offered after discussion;: information on healthy weight added to patient's after visit summary          FOLLOW UP  Return in about 3 months (around 2025) for Medicare Wellness, medication refills and fasting labs.    Patient was given instructions and counseling regarding his condition or for health maintenance advice. Please see specific information pulled into the AVS if appropriate.       Kim Dailey, APRN  25  11:56 EDT    CURRENT & DISCONTINUED MEDICATIONS  Current Outpatient Medications   Medication Instructions    amLODIPine (NORVASC) 5 mg, Oral, Daily    aspirin 81 mg, Oral, Daily    atorvastatin (LIPITOR) 40 mg, Oral, Nightly    carvedilol (COREG) 12.5 mg, Oral, Every 12 Hours Scheduled    cilostazol (PLETAL) 100 mg, Oral, 2 Times Daily    clopidogrel (PLAVIX) 75 mg, Oral, Daily    glucose blood (True Metrix Blood Glucose Test) test strip 1 each, Other, Daily, use to test once daily    Jardiance 10 mg    losartan (COZAAR) 100 mg, Oral, Daily    multivitamin with minerals tablet tablet 1 tablet, Daily    nitroglycerin (NITRODUR) 0.2 MG/HR patch 1 patch, Transdermal, Daily    nitroglycerin (NITROSTAT) 0.3 MG SL tablet 1 under the tongue as needed for angina, may repeat q5mins for up three doses    traMADol  (ULTRAM) 100 mg, Oral, 2 Times Daily PRN       Medications Discontinued During This Encounter   Medication Reason    nitroglycerin (NITROSTAT) 0.3 MG SL tablet Reorder        Patient or patient representative verbalized consent for the use of Ambient Listening during the visit with  RUSS Herrera for chart documentation. 6/6/2025  11:29 EDT

## 2025-06-07 ENCOUNTER — RESULTS FOLLOW-UP (OUTPATIENT)
Dept: FAMILY MEDICINE CLINIC | Facility: CLINIC | Age: 85
End: 2025-06-07
Payer: MEDICARE

## 2025-06-07 NOTE — LETTER
Rudy ROSEANN Oscar  [address] [facility] [place] [id number]    June 7, 2025     Dear Mr. Moore:    Urine drug screen is appropriate.     Below are the results from your recent visit:    Resulted Orders   Urine Drug Screen - Urine, Clean Catch   Result Value Ref Range    THC, Screen, Urine Negative Negative    Phencyclidine (PCP), Urine Negative Negative    Cocaine Screen, Urine Negative Negative    Methamphetamine, Ur Negative Negative    Opiate Screen Negative Negative    Amphetamine Screen, Urine Negative Negative    Benzodiazepine Screen, Urine Negative Negative    Tricyclic Antidepressants Screen Negative Negative    Methadone Screen, Urine Negative Negative    Barbiturates Screen, Urine Negative Negative    Oxycodone Screen, Urine Negative Negative    Buprenorphine, Screen, Urine Negative Negative   Fentanyl, Urine - Urine, Clean Catch   Result Value Ref Range    Fentanyl, Urine Negative Negative       If you have any questions or concerns, please don't hesitate to call.         Sincerely,        Kim Dailey, APRN

## 2025-06-16 ENCOUNTER — LAB REQUISITION (OUTPATIENT)
Dept: LAB | Facility: HOSPITAL | Age: 85
End: 2025-06-16
Payer: MEDICARE

## 2025-06-16 ENCOUNTER — CLINICAL SUPPORT (OUTPATIENT)
Dept: FAMILY MEDICINE CLINIC | Facility: CLINIC | Age: 85
End: 2025-06-16
Payer: MEDICARE

## 2025-06-16 DIAGNOSIS — E11.9 DIABETES MELLITUS WITHOUT COMPLICATION: ICD-10-CM

## 2025-06-16 DIAGNOSIS — E78.5 HYPERLIPIDEMIA, UNSPECIFIED: ICD-10-CM

## 2025-06-16 DIAGNOSIS — I10 UNCONTROLLED HYPERTENSION: ICD-10-CM

## 2025-06-16 DIAGNOSIS — E55.9 VITAMIN D DEFICIENCY, UNSPECIFIED: ICD-10-CM

## 2025-06-16 DIAGNOSIS — N18.31 STAGE 3A CHRONIC KIDNEY DISEASE: Primary | ICD-10-CM

## 2025-06-16 DIAGNOSIS — N40.1 BENIGN PROSTATIC HYPERPLASIA WITH LOWER URINARY TRACT SYMPTOMS, SYMPTOM DETAILS UNSPECIFIED: ICD-10-CM

## 2025-06-16 DIAGNOSIS — N18.32 CHRONIC KIDNEY DISEASE, STAGE 3B: ICD-10-CM

## 2025-06-16 DIAGNOSIS — I50.9 HEART FAILURE, UNSPECIFIED: ICD-10-CM

## 2025-06-16 DIAGNOSIS — I25.10 ATHEROSCLEROTIC HEART DISEASE OF NATIVE CORONARY ARTERY WITHOUT ANGINA PECTORIS: ICD-10-CM

## 2025-06-16 DIAGNOSIS — E11.22 TYPE 2 DIABETES MELLITUS WITH DIABETIC CHRONIC KIDNEY DISEASE: ICD-10-CM

## 2025-06-16 LAB
25(OH)D3 SERPL-MCNC: 41.3 NG/ML (ref 30–100)
ALBUMIN SERPL-MCNC: 3.9 G/DL (ref 3.5–5.2)
ANION GAP SERPL CALCULATED.3IONS-SCNC: 9.5 MMOL/L (ref 5–15)
BACTERIA UR QL AUTO: NORMAL /HPF
BASOPHILS # BLD AUTO: 0.04 10*3/MM3 (ref 0–0.2)
BASOPHILS NFR BLD AUTO: 0.7 % (ref 0–1.5)
BILIRUB UR QL STRIP: NEGATIVE
BUN SERPL-MCNC: 21 MG/DL (ref 8–23)
BUN/CREAT SERPL: 13 (ref 7–25)
CALCIUM SPEC-SCNC: 9.5 MG/DL (ref 8.6–10.5)
CHLORIDE SERPL-SCNC: 105 MMOL/L (ref 98–107)
CLARITY UR: CLEAR
CO2 SERPL-SCNC: 29.5 MMOL/L (ref 22–29)
COLOR UR: YELLOW
CREAT SERPL-MCNC: 1.62 MG/DL (ref 0.76–1.27)
CREAT UR-MCNC: 113.7 MG/DL
DEPRECATED RDW RBC AUTO: 49.3 FL (ref 37–54)
EGFRCR SERPLBLD CKD-EPI 2021: 41.9 ML/MIN/1.73
EOSINOPHIL # BLD AUTO: 0.14 10*3/MM3 (ref 0–0.4)
EOSINOPHIL NFR BLD AUTO: 2.4 % (ref 0.3–6.2)
ERYTHROCYTE [DISTWIDTH] IN BLOOD BY AUTOMATED COUNT: 13.1 % (ref 12.3–15.4)
GLUCOSE SERPL-MCNC: 103 MG/DL (ref 65–99)
GLUCOSE UR STRIP-MCNC: ABNORMAL MG/DL
HCT VFR BLD AUTO: 40 % (ref 37.5–51)
HGB BLD-MCNC: 13.4 G/DL (ref 13–17.7)
HGB UR QL STRIP.AUTO: NEGATIVE
HYALINE CASTS UR QL AUTO: NORMAL /LPF
IMM GRANULOCYTES # BLD AUTO: 0.03 10*3/MM3 (ref 0–0.05)
IMM GRANULOCYTES NFR BLD AUTO: 0.5 % (ref 0–0.5)
KETONES UR QL STRIP: NEGATIVE
LEUKOCYTE ESTERASE UR QL STRIP.AUTO: NEGATIVE
LYMPHOCYTES # BLD AUTO: 2.03 10*3/MM3 (ref 0.7–3.1)
LYMPHOCYTES NFR BLD AUTO: 34.4 % (ref 19.6–45.3)
MCH RBC QN AUTO: 34.4 PG (ref 26.6–33)
MCHC RBC AUTO-ENTMCNC: 33.5 G/DL (ref 31.5–35.7)
MCV RBC AUTO: 102.6 FL (ref 79–97)
MONOCYTES # BLD AUTO: 0.57 10*3/MM3 (ref 0.1–0.9)
MONOCYTES NFR BLD AUTO: 9.7 % (ref 5–12)
NEUTROPHILS NFR BLD AUTO: 3.09 10*3/MM3 (ref 1.7–7)
NEUTROPHILS NFR BLD AUTO: 52.3 % (ref 42.7–76)
NITRITE UR QL STRIP: NEGATIVE
NRBC BLD AUTO-RTO: 0 /100 WBC (ref 0–0.2)
PH UR STRIP.AUTO: 7 [PH] (ref 5–8)
PHOSPHATE SERPL-MCNC: 3.9 MG/DL (ref 2.5–4.5)
PLATELET # BLD AUTO: 205 10*3/MM3 (ref 140–450)
PMV BLD AUTO: 9.7 FL (ref 6–12)
POTASSIUM SERPL-SCNC: 4.6 MMOL/L (ref 3.5–5.2)
PROT ?TM UR-MCNC: 10.9 MG/DL
PROT UR QL STRIP: ABNORMAL
PROT/CREAT UR: 0.1 MG/G{CREAT}
PTH-INTACT SERPL-MCNC: 55.7 PG/ML (ref 15–65)
RBC # BLD AUTO: 3.9 10*6/MM3 (ref 4.14–5.8)
RBC # UR STRIP: NORMAL /HPF
REF LAB TEST METHOD: NORMAL
SODIUM SERPL-SCNC: 144 MMOL/L (ref 136–145)
SP GR UR STRIP: >1.03 (ref 1–1.03)
SQUAMOUS #/AREA URNS HPF: NORMAL /HPF
UROBILINOGEN UR QL STRIP: ABNORMAL
WBC # UR STRIP: NORMAL /HPF
WBC NRBC COR # BLD AUTO: 5.9 10*3/MM3 (ref 3.4–10.8)

## 2025-06-16 PROCEDURE — 84156 ASSAY OF PROTEIN URINE: CPT | Performed by: NURSE PRACTITIONER

## 2025-06-16 PROCEDURE — 81001 URINALYSIS AUTO W/SCOPE: CPT | Performed by: NURSE PRACTITIONER

## 2025-06-16 PROCEDURE — 82306 VITAMIN D 25 HYDROXY: CPT | Performed by: NURSE PRACTITIONER

## 2025-06-16 PROCEDURE — 83970 ASSAY OF PARATHORMONE: CPT | Performed by: NURSE PRACTITIONER

## 2025-06-16 PROCEDURE — 80069 RENAL FUNCTION PANEL: CPT | Performed by: NURSE PRACTITIONER

## 2025-06-16 PROCEDURE — 82570 ASSAY OF URINE CREATININE: CPT | Performed by: NURSE PRACTITIONER

## 2025-06-16 PROCEDURE — 85025 COMPLETE CBC W/AUTO DIFF WBC: CPT | Performed by: NURSE PRACTITIONER

## 2025-06-16 NOTE — PROGRESS NOTES
..  Venipuncture Blood Specimen Collection  Venipuncture performed in LT arm by Lillie Tinsley with good hemostasis. Patient tolerated the procedure well without complications.   06/16/25   Jo Kingsley MA

## 2025-07-02 DIAGNOSIS — M79.18 MUSCULOSKELETAL PAIN: ICD-10-CM

## 2025-07-02 DIAGNOSIS — N18.31 STAGE 3A CHRONIC KIDNEY DISEASE: ICD-10-CM

## 2025-07-02 DIAGNOSIS — M48.10 DISH (DIFFUSE IDIOPATHIC SKELETAL HYPEROSTOSIS): ICD-10-CM

## 2025-07-02 DIAGNOSIS — Z79.899 HIGH RISK MEDICATION USE: ICD-10-CM

## 2025-07-02 RX ORDER — TRAMADOL HYDROCHLORIDE 50 MG/1
100 TABLET ORAL 2 TIMES DAILY PRN
Qty: 120 TABLET | Refills: 2 | Status: SHIPPED | OUTPATIENT
Start: 2025-07-02

## 2025-08-27 RX ORDER — NITROGLYCERIN 40 MG/1
1 PATCH TRANSDERMAL DAILY
Qty: 90 PATCH | Refills: 0 | Status: SHIPPED | OUTPATIENT
Start: 2025-08-27 | End: 2025-08-28 | Stop reason: SDUPTHER

## 2025-08-28 DIAGNOSIS — I21.4 NSTEMI (NON-ST ELEVATED MYOCARDIAL INFARCTION): ICD-10-CM

## 2025-08-28 RX ORDER — NITROGLYCERIN 0.3 MG/1
TABLET SUBLINGUAL
Qty: 100 TABLET | Refills: 0 | Status: SHIPPED | OUTPATIENT
Start: 2025-08-28

## 2025-08-28 RX ORDER — NITROGLYCERIN 40 MG/1
1 PATCH TRANSDERMAL DAILY
Qty: 90 PATCH | Refills: 0 | Status: SHIPPED | OUTPATIENT
Start: 2025-08-28

## (undated) DEVICE — INTRO SHEATH PRELUDE/PRO .035 5F 11X50CM GRY LF

## (undated) DEVICE — KT INTRO MIC VSI SMOTH REG 4F 40CM 7CM

## (undated) DEVICE — CATH F4 INF JR 4 100CM: Brand: INFINITI

## (undated) DEVICE — CATH 4F INF PIG 145Â° 110 CM: Brand: INFINITI

## (undated) DEVICE — GLIDESHEATH SLENDER STAINLESS STEEL KIT: Brand: GLIDESHEATH SLENDER

## (undated) DEVICE — CATH F4 INF JL 4 100CM: Brand: INFINITI

## (undated) DEVICE — RADIFOCUS GLIDEWIRE: Brand: GLIDEWIRE

## (undated) DEVICE — CATH LAB PACK: Brand: MEDLINE INDUSTRIES, INC.

## (undated) DEVICE — GW INQWIRE FC PTFE STD J/1.5 .035 260